# Patient Record
Sex: FEMALE | Race: WHITE | Employment: OTHER | URBAN - METROPOLITAN AREA
[De-identification: names, ages, dates, MRNs, and addresses within clinical notes are randomized per-mention and may not be internally consistent; named-entity substitution may affect disease eponyms.]

---

## 2017-03-07 PROBLEM — Z95.2 H/O MITRAL VALVE REPLACEMENT: Status: ACTIVE | Noted: 2017-03-07

## 2017-03-07 PROBLEM — E03.9 ACQUIRED HYPOTHYROIDISM: Status: ACTIVE | Noted: 2017-03-07

## 2017-03-07 PROBLEM — Z95.2 H/O AORTIC VALVE REPLACEMENT: Status: ACTIVE | Noted: 2017-03-07

## 2017-03-07 PROBLEM — H35.341 MACULAR HOLE OF RIGHT EYE: Status: ACTIVE | Noted: 2017-03-07

## 2018-04-05 ENCOUNTER — ANTI-COAG VISIT (OUTPATIENT)
Dept: FAMILY MEDICINE CLINIC | Age: 68
End: 2018-04-05
Payer: MEDICARE

## 2018-04-05 DIAGNOSIS — Z95.2 H/O AORTIC VALVE REPLACEMENT: ICD-10-CM

## 2018-04-05 DIAGNOSIS — Z95.2 H/O MITRAL VALVE REPLACEMENT: ICD-10-CM

## 2018-04-05 LAB — INR BLD: 3.4

## 2018-04-05 PROCEDURE — 93793 ANTICOAG MGMT PT WARFARIN: CPT | Performed by: FAMILY MEDICINE

## 2018-04-12 DIAGNOSIS — E03.9 ACQUIRED HYPOTHYROIDISM: ICD-10-CM

## 2018-04-12 DIAGNOSIS — R53.83 FATIGUE, UNSPECIFIED TYPE: ICD-10-CM

## 2018-04-12 DIAGNOSIS — Z13.220 SCREENING FOR HYPERLIPIDEMIA: ICD-10-CM

## 2018-04-16 DIAGNOSIS — E55.9 VITAMIN D DEFICIENCY: ICD-10-CM

## 2018-05-09 ENCOUNTER — ANTI-COAG VISIT (OUTPATIENT)
Dept: FAMILY MEDICINE CLINIC | Age: 68
End: 2018-05-09

## 2018-05-09 LAB — INR BLD: 2.7

## 2018-07-01 LAB — INR BLD: 2.7

## 2018-07-02 ENCOUNTER — ANTI-COAG VISIT (OUTPATIENT)
Dept: FAMILY MEDICINE CLINIC | Age: 68
End: 2018-07-02
Payer: MEDICARE

## 2018-07-02 DIAGNOSIS — Z95.2 H/O MITRAL VALVE REPLACEMENT: ICD-10-CM

## 2018-07-02 DIAGNOSIS — Z95.2 H/O AORTIC VALVE REPLACEMENT: ICD-10-CM

## 2018-07-02 PROCEDURE — 93793 ANTICOAG MGMT PT WARFARIN: CPT | Performed by: FAMILY MEDICINE

## 2018-08-02 DIAGNOSIS — Z95.2 H/O AORTIC VALVE REPLACEMENT: Primary | ICD-10-CM

## 2018-08-03 ENCOUNTER — ANTI-COAG VISIT (OUTPATIENT)
Dept: FAMILY MEDICINE CLINIC | Age: 68
End: 2018-08-03
Payer: MEDICARE

## 2018-08-03 DIAGNOSIS — Z95.2 H/O MITRAL VALVE REPLACEMENT: ICD-10-CM

## 2018-08-03 DIAGNOSIS — Z95.2 H/O AORTIC VALVE REPLACEMENT: ICD-10-CM

## 2018-08-03 LAB — INR BLD: 3.2

## 2018-08-03 PROCEDURE — 93793 ANTICOAG MGMT PT WARFARIN: CPT | Performed by: FAMILY MEDICINE

## 2018-09-11 ENCOUNTER — OFFICE VISIT (OUTPATIENT)
Dept: FAMILY MEDICINE CLINIC | Age: 68
End: 2018-09-11
Payer: MEDICARE

## 2018-09-11 ENCOUNTER — ANTI-COAG VISIT (OUTPATIENT)
Dept: FAMILY MEDICINE CLINIC | Age: 68
End: 2018-09-11
Payer: MEDICARE

## 2018-09-11 VITALS
HEART RATE: 81 BPM | TEMPERATURE: 98.4 F | OXYGEN SATURATION: 98 % | WEIGHT: 149 LBS | RESPIRATION RATE: 16 BRPM | HEIGHT: 66 IN | SYSTOLIC BLOOD PRESSURE: 112 MMHG | DIASTOLIC BLOOD PRESSURE: 64 MMHG | BODY MASS INDEX: 23.95 KG/M2

## 2018-09-11 DIAGNOSIS — Z01.818 PREOP EXAMINATION: Primary | ICD-10-CM

## 2018-09-11 DIAGNOSIS — Z95.2 H/O AORTIC VALVE REPLACEMENT: ICD-10-CM

## 2018-09-11 LAB — INR BLD: 1.9

## 2018-09-11 PROCEDURE — 1123F ACP DISCUSS/DSCN MKR DOCD: CPT | Performed by: FAMILY MEDICINE

## 2018-09-11 PROCEDURE — G8400 PT W/DXA NO RESULTS DOC: HCPCS | Performed by: FAMILY MEDICINE

## 2018-09-11 PROCEDURE — 1090F PRES/ABSN URINE INCON ASSESS: CPT | Performed by: FAMILY MEDICINE

## 2018-09-11 PROCEDURE — 1101F PT FALLS ASSESS-DOCD LE1/YR: CPT | Performed by: FAMILY MEDICINE

## 2018-09-11 PROCEDURE — 3017F COLORECTAL CA SCREEN DOC REV: CPT | Performed by: FAMILY MEDICINE

## 2018-09-11 PROCEDURE — 4040F PNEUMOC VAC/ADMIN/RCVD: CPT | Performed by: FAMILY MEDICINE

## 2018-09-11 PROCEDURE — 93000 ELECTROCARDIOGRAM COMPLETE: CPT | Performed by: FAMILY MEDICINE

## 2018-09-11 PROCEDURE — 4004F PT TOBACCO SCREEN RCVD TLK: CPT | Performed by: FAMILY MEDICINE

## 2018-09-11 PROCEDURE — 99213 OFFICE O/P EST LOW 20 MIN: CPT | Performed by: FAMILY MEDICINE

## 2018-09-11 PROCEDURE — G8427 DOCREV CUR MEDS BY ELIG CLIN: HCPCS | Performed by: FAMILY MEDICINE

## 2018-09-11 PROCEDURE — 93793 ANTICOAG MGMT PT WARFARIN: CPT | Performed by: FAMILY MEDICINE

## 2018-09-11 PROCEDURE — G8420 CALC BMI NORM PARAMETERS: HCPCS | Performed by: FAMILY MEDICINE

## 2018-09-11 ASSESSMENT — ENCOUNTER SYMPTOMS
NAUSEA: 0
COUGH: 0
HEARTBURN: 0
ORTHOPNEA: 0

## 2018-09-11 NOTE — PROGRESS NOTES
HPI:  Patient comes in today for   Chief Complaint   Patient presents with    Pre-op Exam     pt here for surgical clearance, having cataract surgery on right eye on 10/3/18 with Dr. Luanne Herrera   . H/O Aortic and Mitral Valve replacement. Review of Systems  Review of Systems   Constitutional: Negative for chills, fever and weight loss. HENT: Negative for hearing loss and tinnitus. Respiratory: Negative for cough. Cardiovascular: Negative for chest pain, palpitations, orthopnea, claudication and leg swelling. Gastrointestinal: Negative for heartburn and nausea. Genitourinary: Negative for dysuria. Musculoskeletal: Negative for myalgias and neck pain. Skin: Negative for rash. Neurological: Negative for dizziness and headaches. PE:  VS:  /64   Pulse 81   Temp 98.4 °F (36.9 °C)   Resp 16   Ht 5' 5.5\" (1.664 m)   Wt 149 lb (67.6 kg)   SpO2 98%   BMI 24.42 kg/m²   Physical Exam   Constitutional: She appears well-developed. HENT:   Head: Normocephalic and atraumatic. Eyes: Pupils are equal, round, and reactive to light. EOM are normal.   Neck: Normal range of motion. No thyromegaly present. Cardiovascular: Normal rate. Pulmonary/Chest: Effort normal.   Abdominal: Soft. She exhibits no distension. There is no tenderness. Musculoskeletal: Normal range of motion. Neurological: She is alert. Skin: Skin is warm. No erythema. Psychiatric: She has a normal mood and affect. Assessment/Plan:  Latoya Corbin was seen today for pre-op exam.    Diagnoses and all orders for this visit:    Preop examination  -     EKG 12 Lead    Latoya Corbin is medically cleared for her cataract surgery. CHLOE Smith

## 2018-09-27 ENCOUNTER — ANTI-COAG VISIT (OUTPATIENT)
Dept: FAMILY MEDICINE CLINIC | Age: 68
End: 2018-09-27
Payer: MEDICARE

## 2018-09-27 DIAGNOSIS — Z95.2 H/O AORTIC VALVE REPLACEMENT: ICD-10-CM

## 2018-09-27 LAB — INR BLD: 3.7

## 2018-09-27 PROCEDURE — 93793 ANTICOAG MGMT PT WARFARIN: CPT | Performed by: FAMILY MEDICINE

## 2018-10-24 ENCOUNTER — ANTI-COAG VISIT (OUTPATIENT)
Dept: FAMILY MEDICINE CLINIC | Age: 68
End: 2018-10-24
Payer: MEDICARE

## 2018-10-24 DIAGNOSIS — Z95.2 H/O MITRAL VALVE REPLACEMENT: ICD-10-CM

## 2018-10-24 DIAGNOSIS — Z95.2 H/O AORTIC VALVE REPLACEMENT: ICD-10-CM

## 2018-10-24 LAB — INR BLD: 2.8

## 2018-10-24 PROCEDURE — 93793 ANTICOAG MGMT PT WARFARIN: CPT | Performed by: FAMILY MEDICINE

## 2018-12-03 ENCOUNTER — ANTI-COAG VISIT (OUTPATIENT)
Dept: FAMILY MEDICINE CLINIC | Age: 68
End: 2018-12-03
Payer: MEDICARE

## 2018-12-03 DIAGNOSIS — Z95.2 H/O MITRAL VALVE REPLACEMENT: ICD-10-CM

## 2018-12-03 DIAGNOSIS — Z95.2 H/O AORTIC VALVE REPLACEMENT: ICD-10-CM

## 2018-12-03 LAB — INR BLD: 3.5

## 2018-12-03 PROCEDURE — 93793 ANTICOAG MGMT PT WARFARIN: CPT | Performed by: FAMILY MEDICINE

## 2019-01-03 ENCOUNTER — ANTI-COAG VISIT (OUTPATIENT)
Dept: FAMILY MEDICINE CLINIC | Age: 69
End: 2019-01-03

## 2019-01-03 LAB — INR BLD: 2.6

## 2019-01-31 ENCOUNTER — ANTI-COAG VISIT (OUTPATIENT)
Dept: FAMILY MEDICINE CLINIC | Age: 69
End: 2019-01-31
Payer: MEDICARE

## 2019-01-31 DIAGNOSIS — Z95.2 H/O AORTIC VALVE REPLACEMENT: ICD-10-CM

## 2019-01-31 LAB — INR BLD: 3.6

## 2019-01-31 PROCEDURE — 93793 ANTICOAG MGMT PT WARFARIN: CPT | Performed by: FAMILY MEDICINE

## 2019-02-27 LAB — INR BLD: 3.2

## 2019-02-28 ENCOUNTER — ANTI-COAG VISIT (OUTPATIENT)
Dept: FAMILY MEDICINE CLINIC | Age: 69
End: 2019-02-28
Payer: MEDICARE

## 2019-02-28 DIAGNOSIS — Z95.2 H/O MITRAL VALVE REPLACEMENT: ICD-10-CM

## 2019-02-28 PROCEDURE — 93793 ANTICOAG MGMT PT WARFARIN: CPT | Performed by: FAMILY MEDICINE

## 2019-05-02 ENCOUNTER — ANTI-COAG VISIT (OUTPATIENT)
Dept: FAMILY MEDICINE CLINIC | Age: 69
End: 2019-05-02
Payer: MEDICARE

## 2019-05-02 DIAGNOSIS — Z95.2 H/O AORTIC VALVE REPLACEMENT: ICD-10-CM

## 2019-05-02 DIAGNOSIS — Z95.2 H/O MITRAL VALVE REPLACEMENT: ICD-10-CM

## 2019-05-02 LAB — INR BLD: 2.7

## 2019-05-02 PROCEDURE — 93793 ANTICOAG MGMT PT WARFARIN: CPT | Performed by: FAMILY MEDICINE

## 2019-06-03 ENCOUNTER — ANTI-COAG VISIT (OUTPATIENT)
Dept: FAMILY MEDICINE CLINIC | Age: 69
End: 2019-06-03
Payer: MEDICARE

## 2019-06-03 DIAGNOSIS — Z95.2 H/O AORTIC VALVE REPLACEMENT: ICD-10-CM

## 2019-06-03 DIAGNOSIS — Z95.2 H/O MITRAL VALVE REPLACEMENT: ICD-10-CM

## 2019-06-03 LAB — INR BLD: 7

## 2019-06-03 PROCEDURE — 93793 ANTICOAG MGMT PT WARFARIN: CPT | Performed by: FAMILY MEDICINE

## 2019-06-03 NOTE — PROGRESS NOTES
Verbal orders obtained from Dr Nafisa Benton for pt to hold coumadin until Thursday and recheck INR Thursday am. Advised pt understanding voiced.

## 2019-06-06 ENCOUNTER — ANTI-COAG VISIT (OUTPATIENT)
Dept: FAMILY MEDICINE CLINIC | Age: 69
End: 2019-06-06
Payer: MEDICARE

## 2019-06-06 DIAGNOSIS — Z95.2 H/O AORTIC VALVE REPLACEMENT: ICD-10-CM

## 2019-06-06 LAB — INR BLD: 1.3

## 2019-06-06 PROCEDURE — 93793 ANTICOAG MGMT PT WARFARIN: CPT | Performed by: FAMILY MEDICINE

## 2019-06-11 ENCOUNTER — ANTI-COAG VISIT (OUTPATIENT)
Dept: FAMILY MEDICINE CLINIC | Age: 69
End: 2019-06-11
Payer: MEDICARE

## 2019-06-11 DIAGNOSIS — Z95.2 H/O MITRAL VALVE REPLACEMENT: ICD-10-CM

## 2019-06-11 DIAGNOSIS — Z95.2 H/O AORTIC VALVE REPLACEMENT: ICD-10-CM

## 2019-06-11 LAB — INR BLD: 1.5

## 2019-06-11 PROCEDURE — 93793 ANTICOAG MGMT PT WARFARIN: CPT | Performed by: FAMILY MEDICINE

## 2019-06-17 ENCOUNTER — ANTI-COAG VISIT (OUTPATIENT)
Dept: FAMILY MEDICINE CLINIC | Age: 69
End: 2019-06-17
Payer: MEDICARE

## 2019-06-17 DIAGNOSIS — Z95.2 H/O AORTIC VALVE REPLACEMENT: ICD-10-CM

## 2019-06-17 LAB — INR BLD: 2.5

## 2019-06-17 PROCEDURE — 93793 ANTICOAG MGMT PT WARFARIN: CPT | Performed by: FAMILY MEDICINE

## 2019-07-02 ENCOUNTER — ANTI-COAG VISIT (OUTPATIENT)
Dept: FAMILY MEDICINE CLINIC | Age: 69
End: 2019-07-02
Payer: MEDICARE

## 2019-07-02 DIAGNOSIS — Z95.2 H/O MITRAL VALVE REPLACEMENT: ICD-10-CM

## 2019-07-02 DIAGNOSIS — Z95.2 H/O AORTIC VALVE REPLACEMENT: ICD-10-CM

## 2019-07-02 LAB — INR BLD: 2.3

## 2019-07-02 PROCEDURE — 93793 ANTICOAG MGMT PT WARFARIN: CPT | Performed by: FAMILY MEDICINE

## 2019-08-28 ENCOUNTER — ANTI-COAG VISIT (OUTPATIENT)
Dept: FAMILY MEDICINE CLINIC | Age: 69
End: 2019-08-28
Payer: MEDICARE

## 2019-08-28 DIAGNOSIS — Z95.2 H/O AORTIC VALVE REPLACEMENT: ICD-10-CM

## 2019-08-28 DIAGNOSIS — Z95.2 H/O MITRAL VALVE REPLACEMENT: ICD-10-CM

## 2019-08-28 LAB — INR BLD: 2.3

## 2019-08-28 PROCEDURE — 93793 ANTICOAG MGMT PT WARFARIN: CPT | Performed by: FAMILY MEDICINE

## 2019-09-04 ENCOUNTER — ANTI-COAG VISIT (OUTPATIENT)
Dept: FAMILY MEDICINE CLINIC | Age: 69
End: 2019-09-04
Payer: MEDICARE

## 2019-09-04 DIAGNOSIS — Z95.2 H/O AORTIC VALVE REPLACEMENT: ICD-10-CM

## 2019-09-04 LAB — INR BLD: 1.9

## 2019-09-04 PROCEDURE — 93793 ANTICOAG MGMT PT WARFARIN: CPT | Performed by: FAMILY MEDICINE

## 2019-09-20 ENCOUNTER — ANTI-COAG VISIT (OUTPATIENT)
Dept: FAMILY MEDICINE CLINIC | Age: 69
End: 2019-09-20
Payer: MEDICARE

## 2019-09-20 DIAGNOSIS — Z95.2 H/O AORTIC VALVE REPLACEMENT: ICD-10-CM

## 2019-09-20 DIAGNOSIS — Z95.2 H/O MITRAL VALVE REPLACEMENT: ICD-10-CM

## 2019-09-20 LAB — INR BLD: 3

## 2019-09-20 PROCEDURE — 93793 ANTICOAG MGMT PT WARFARIN: CPT | Performed by: FAMILY MEDICINE

## 2019-10-07 DIAGNOSIS — Z95.2 H/O MITRAL VALVE REPLACEMENT: ICD-10-CM

## 2019-10-07 DIAGNOSIS — Z95.2 H/O AORTIC VALVE REPLACEMENT: ICD-10-CM

## 2019-10-07 DIAGNOSIS — E03.9 ACQUIRED HYPOTHYROIDISM: ICD-10-CM

## 2019-10-07 RX ORDER — WARFARIN SODIUM 10 MG/1
TABLET ORAL
Qty: 30 TABLET | Refills: 0 | Status: SHIPPED | OUTPATIENT
Start: 2019-10-07 | End: 2019-11-11 | Stop reason: SDUPTHER

## 2019-10-07 RX ORDER — LEVOTHYROXINE SODIUM 100 MCG
TABLET ORAL
Qty: 90 TABLET | Refills: 1 | Status: SHIPPED | OUTPATIENT
Start: 2019-10-07 | End: 2020-01-15 | Stop reason: SDUPTHER

## 2019-10-07 RX ORDER — ATENOLOL 50 MG/1
TABLET ORAL
Qty: 30 TABLET | Refills: 0 | Status: SHIPPED | OUTPATIENT
Start: 2019-10-07 | End: 2019-11-11 | Stop reason: SDUPTHER

## 2019-11-09 LAB — INR BLD: 2.6

## 2019-11-11 ENCOUNTER — ANTI-COAG VISIT (OUTPATIENT)
Dept: FAMILY MEDICINE CLINIC | Age: 69
End: 2019-11-11
Payer: MEDICARE

## 2019-11-11 DIAGNOSIS — Z95.2 H/O AORTIC VALVE REPLACEMENT: ICD-10-CM

## 2019-11-11 DIAGNOSIS — Z95.2 H/O MITRAL VALVE REPLACEMENT: ICD-10-CM

## 2019-11-11 PROCEDURE — 93793 ANTICOAG MGMT PT WARFARIN: CPT | Performed by: FAMILY MEDICINE

## 2019-11-11 RX ORDER — ATENOLOL 50 MG/1
TABLET ORAL
Qty: 30 TABLET | Refills: 0 | Status: SHIPPED | OUTPATIENT
Start: 2019-11-11 | End: 2019-12-10 | Stop reason: SDUPTHER

## 2019-11-11 RX ORDER — WARFARIN SODIUM 10 MG/1
TABLET ORAL
Qty: 30 TABLET | Refills: 0 | Status: SHIPPED | OUTPATIENT
Start: 2019-11-11 | End: 2019-12-10 | Stop reason: SDUPTHER

## 2019-12-10 ENCOUNTER — ANTI-COAG VISIT (OUTPATIENT)
Dept: FAMILY MEDICINE CLINIC | Age: 69
End: 2019-12-10
Payer: MEDICARE

## 2019-12-10 DIAGNOSIS — Z95.2 H/O MITRAL VALVE REPLACEMENT: ICD-10-CM

## 2019-12-10 DIAGNOSIS — Z95.2 H/O AORTIC VALVE REPLACEMENT: ICD-10-CM

## 2019-12-10 LAB — INR BLD: 2.4

## 2019-12-10 PROCEDURE — 93793 ANTICOAG MGMT PT WARFARIN: CPT | Performed by: FAMILY MEDICINE

## 2019-12-10 RX ORDER — WARFARIN SODIUM 10 MG/1
TABLET ORAL
Qty: 30 TABLET | Refills: 0 | Status: SHIPPED | OUTPATIENT
Start: 2019-12-10 | End: 2020-01-15 | Stop reason: SDUPTHER

## 2019-12-10 RX ORDER — ATENOLOL 50 MG/1
TABLET ORAL
Qty: 30 TABLET | Refills: 0 | Status: SHIPPED | OUTPATIENT
Start: 2019-12-10 | End: 2020-01-14

## 2020-01-14 RX ORDER — ATENOLOL 50 MG/1
TABLET ORAL
Qty: 5 TABLET | Refills: 0 | Status: SHIPPED | OUTPATIENT
Start: 2020-01-14 | End: 2020-01-15 | Stop reason: SDUPTHER

## 2020-01-15 ENCOUNTER — OFFICE VISIT (OUTPATIENT)
Dept: FAMILY MEDICINE CLINIC | Age: 70
End: 2020-01-15
Payer: MEDICARE

## 2020-01-15 VITALS
OXYGEN SATURATION: 97 % | SYSTOLIC BLOOD PRESSURE: 128 MMHG | TEMPERATURE: 98 F | WEIGHT: 152 LBS | HEART RATE: 152 BPM | RESPIRATION RATE: 16 BRPM | BODY MASS INDEX: 25.33 KG/M2 | DIASTOLIC BLOOD PRESSURE: 86 MMHG | HEIGHT: 65 IN

## 2020-01-15 PROCEDURE — 1123F ACP DISCUSS/DSCN MKR DOCD: CPT | Performed by: FAMILY MEDICINE

## 2020-01-15 PROCEDURE — 4040F PNEUMOC VAC/ADMIN/RCVD: CPT | Performed by: FAMILY MEDICINE

## 2020-01-15 PROCEDURE — G0438 PPPS, INITIAL VISIT: HCPCS | Performed by: FAMILY MEDICINE

## 2020-01-15 PROCEDURE — G8482 FLU IMMUNIZE ORDER/ADMIN: HCPCS | Performed by: FAMILY MEDICINE

## 2020-01-15 PROCEDURE — 3017F COLORECTAL CA SCREEN DOC REV: CPT | Performed by: FAMILY MEDICINE

## 2020-01-15 RX ORDER — ATENOLOL 50 MG/1
TABLET ORAL
Qty: 30 TABLET | Refills: 11 | Status: ON HOLD
Start: 2020-01-15 | End: 2020-05-17 | Stop reason: HOSPADM

## 2020-01-15 RX ORDER — BUSPIRONE HYDROCHLORIDE 5 MG/1
5 TABLET ORAL 2 TIMES DAILY
Qty: 60 TABLET | Refills: 0 | Status: SHIPPED
Start: 2020-01-15 | End: 2020-02-10

## 2020-01-15 RX ORDER — WARFARIN SODIUM 10 MG/1
TABLET ORAL
Qty: 30 TABLET | Refills: 11 | Status: SHIPPED
Start: 2020-01-15 | End: 2020-05-14 | Stop reason: DRUGHIGH

## 2020-01-15 RX ORDER — LEVOTHYROXINE SODIUM 100 MCG
TABLET ORAL
Qty: 30 TABLET | Refills: 11 | Status: ON HOLD
Start: 2020-01-15 | End: 2020-05-17 | Stop reason: HOSPADM

## 2020-01-15 ASSESSMENT — LIFESTYLE VARIABLES
HOW OFTEN DO YOU HAVE SIX OR MORE DRINKS ON ONE OCCASION: 0
HOW OFTEN DO YOU HAVE A DRINK CONTAINING ALCOHOL: 1
AUDIT-C TOTAL SCORE: 1
HOW MANY STANDARD DRINKS CONTAINING ALCOHOL DO YOU HAVE ON A TYPICAL DAY: 0

## 2020-01-15 ASSESSMENT — PATIENT HEALTH QUESTIONNAIRE - PHQ9
SUM OF ALL RESPONSES TO PHQ QUESTIONS 1-9: 1
SUM OF ALL RESPONSES TO PHQ QUESTIONS 1-9: 1

## 2020-01-15 NOTE — PROGRESS NOTES
Alcohol Misuse  How often do you have a drink containing alcohol?: Monthly or less  How many standard drinks containing alcohol do you have on a typical day when drinking?: One or two  How often do you have six or more drinks on one occasion?: Never  Audit-C Score: 1  Substance Abuse Interventions:  · none    General Health:  General  In general, how would you say your health is?: Very Good  In the past 7 days, have you experienced any of the following? New or Increased Pain, New or Increased Fatigue, Loneliness, Social Isolation, Stress or Anger?: (!) Stress  Do you get the social and emotional support that you need?: Yes  Do you have a Living Will?: (!) No  General Health Risk Interventions:  · none    Health Habits/Nutrition:  Health Habits/Nutrition  Do you exercise for at least 20 minutes 2-3 times per week?: Yes  Have you lost any weight without trying in the past 3 months?: No  Do you eat fewer than 2 meals per day?: No  Have you seen a dentist within the past year?: (!) No  Body mass index is 25.29 kg/m².   Health Habits/Nutrition Interventions:  · none    Hearing/Vision:  No exam data present  Hearing/Vision  Do you or your family notice any trouble with your hearing?: No  Do you have difficulty driving, watching TV, or doing any of your daily activities because of your eyesight?: (!) Yes  Have you had an eye exam within the past year?: (!) No  Hearing/Vision Interventions:  · none    Personalized Preventive Plan   Current Health Maintenance Status  Immunization History   Administered Date(s) Administered    Influenza, Triv, inactivated, subunit, adjuvanted, IM (Fluad 65 yrs and older) 10/13/2018, 10/14/2019    Pneumococcal Conjugate 13-valent (Shamika Teachey) 03/09/2018    Tdap (Boostrix, Adacel) 05/10/2018        Health Maintenance   Topic Date Due    Hepatitis C screen  1950    Diabetes screen  02/19/1989    Breast cancer screen  02/19/1999    Shingles Vaccine (1 of 2) 02/19/1999    Colon cancer

## 2020-01-21 LAB
ALBUMIN SERPL-MCNC: NORMAL G/DL
ALP BLD-CCNC: NORMAL U/L
ALT SERPL-CCNC: NORMAL U/L
ANION GAP SERPL CALCULATED.3IONS-SCNC: NORMAL MMOL/L
ANTIBODY: NORMAL
AST SERPL-CCNC: NORMAL U/L
BASOPHILS ABSOLUTE: NORMAL
BASOPHILS RELATIVE PERCENT: NORMAL
BILIRUB SERPL-MCNC: NORMAL MG/DL
BUN BLDV-MCNC: NORMAL MG/DL
CALCIUM SERPL-MCNC: NORMAL MG/DL
CHLORIDE BLD-SCNC: NORMAL MMOL/L
CHOLESTEROL, TOTAL: 181 MG/DL
CHOLESTEROL/HDL RATIO: NORMAL
CO2: NORMAL
CREAT SERPL-MCNC: NORMAL MG/DL
EOSINOPHILS ABSOLUTE: NORMAL
EOSINOPHILS RELATIVE PERCENT: NORMAL
GFR CALCULATED: NORMAL
GLUCOSE BLD-MCNC: NORMAL MG/DL
HCT VFR BLD CALC: NORMAL %
HDLC SERPL-MCNC: 70 MG/DL (ref 35–70)
HEMOGLOBIN: NORMAL
LDL CHOLESTEROL CALCULATED: 95 MG/DL (ref 0–160)
LYMPHOCYTES ABSOLUTE: NORMAL
LYMPHOCYTES RELATIVE PERCENT: NORMAL
MCH RBC QN AUTO: NORMAL PG
MCHC RBC AUTO-ENTMCNC: NORMAL G/DL
MCV RBC AUTO: NORMAL FL
MONOCYTES ABSOLUTE: NORMAL
MONOCYTES RELATIVE PERCENT: NORMAL
NEUTROPHILS ABSOLUTE: NORMAL
NEUTROPHILS RELATIVE PERCENT: NORMAL
PDW BLD-RTO: NORMAL %
PLATELET # BLD: NORMAL 10*3/UL
PMV BLD AUTO: NORMAL FL
POTASSIUM SERPL-SCNC: NORMAL MMOL/L
RBC # BLD: NORMAL 10*6/UL
SODIUM BLD-SCNC: NORMAL MMOL/L
T4 FREE: 1.7
TOTAL PROTEIN: NORMAL
TRIGL SERPL-MCNC: 80 MG/DL
TSH SERPL DL<=0.05 MIU/L-ACNC: 1.61 UIU/ML
VLDLC SERPL CALC-MCNC: 16 MG/DL
WBC # BLD: NORMAL 10*3/UL

## 2020-01-22 ENCOUNTER — ANTI-COAG VISIT (OUTPATIENT)
Dept: FAMILY MEDICINE CLINIC | Age: 70
End: 2020-01-22
Payer: MEDICARE

## 2020-01-22 LAB — INR BLD: 3.2

## 2020-01-22 PROCEDURE — 93793 ANTICOAG MGMT PT WARFARIN: CPT | Performed by: FAMILY MEDICINE

## 2020-02-10 RX ORDER — BUSPIRONE HYDROCHLORIDE 5 MG/1
TABLET ORAL
Qty: 60 TABLET | Refills: 0 | Status: SHIPPED
Start: 2020-02-10 | End: 2020-03-13

## 2020-02-13 ENCOUNTER — HOSPITAL ENCOUNTER (OUTPATIENT)
Dept: MAMMOGRAPHY | Age: 70
Discharge: HOME | End: 2020-02-15
Payer: MEDICARE

## 2020-02-18 ENCOUNTER — ANTI-COAG VISIT (OUTPATIENT)
Dept: FAMILY MEDICINE CLINIC | Age: 70
End: 2020-02-18
Payer: MEDICARE

## 2020-02-18 LAB — INR BLD: 2.6

## 2020-02-18 PROCEDURE — 93793 ANTICOAG MGMT PT WARFARIN: CPT | Performed by: FAMILY MEDICINE

## 2020-03-13 RX ORDER — BUSPIRONE HYDROCHLORIDE 5 MG/1
TABLET ORAL
Qty: 60 TABLET | Refills: 0 | Status: SHIPPED
Start: 2020-03-13 | End: 2020-04-13

## 2020-03-19 ENCOUNTER — ANTI-COAG VISIT (OUTPATIENT)
Dept: FAMILY MEDICINE CLINIC | Age: 70
End: 2020-03-19
Payer: MEDICARE

## 2020-03-19 LAB — INR BLD: 3.5

## 2020-03-19 PROCEDURE — 93793 ANTICOAG MGMT PT WARFARIN: CPT | Performed by: FAMILY MEDICINE

## 2020-04-13 RX ORDER — BUSPIRONE HYDROCHLORIDE 5 MG/1
TABLET ORAL
Qty: 60 TABLET | Refills: 0 | Status: ON HOLD
Start: 2020-04-13 | End: 2020-05-17 | Stop reason: HOSPADM

## 2020-04-21 ENCOUNTER — ANTI-COAG VISIT (OUTPATIENT)
Dept: FAMILY MEDICINE CLINIC | Age: 70
End: 2020-04-21
Payer: MEDICARE

## 2020-04-21 LAB — INR BLD: 1.3

## 2020-04-21 PROCEDURE — 93793 ANTICOAG MGMT PT WARFARIN: CPT | Performed by: FAMILY MEDICINE

## 2020-04-29 ENCOUNTER — ANTI-COAG VISIT (OUTPATIENT)
Dept: FAMILY MEDICINE CLINIC | Age: 70
End: 2020-04-29
Payer: MEDICARE

## 2020-04-29 LAB — INR BLD: 1.8

## 2020-04-29 PROCEDURE — 93793 ANTICOAG MGMT PT WARFARIN: CPT | Performed by: FAMILY MEDICINE

## 2020-05-14 ENCOUNTER — APPOINTMENT (OUTPATIENT)
Dept: CT IMAGING | Age: 70
DRG: 281 | End: 2020-05-14
Payer: MEDICARE

## 2020-05-14 ENCOUNTER — HOSPITAL ENCOUNTER (INPATIENT)
Age: 70
LOS: 3 days | Discharge: ANOTHER HOSPITAL | DRG: 281 | End: 2020-05-17
Attending: EMERGENCY MEDICINE | Admitting: INTERNAL MEDICINE
Payer: MEDICARE

## 2020-05-14 ENCOUNTER — OFFICE VISIT (OUTPATIENT)
Dept: FAMILY MEDICINE CLINIC | Age: 70
End: 2020-05-14
Payer: MEDICARE

## 2020-05-14 VITALS
DIASTOLIC BLOOD PRESSURE: 80 MMHG | OXYGEN SATURATION: 94 % | SYSTOLIC BLOOD PRESSURE: 122 MMHG | HEIGHT: 65 IN | HEART RATE: 138 BPM | BODY MASS INDEX: 25.29 KG/M2 | TEMPERATURE: 97.2 F

## 2020-05-14 PROBLEM — I21.4 NSTEMI (NON-ST ELEVATED MYOCARDIAL INFARCTION) (HCC): Status: ACTIVE | Noted: 2020-05-14

## 2020-05-14 PROBLEM — I48.92 ATRIAL FLUTTER (HCC): Status: ACTIVE | Noted: 2020-05-14

## 2020-05-14 LAB
ABO/RH: NORMAL
ALBUMIN SERPL-MCNC: 4.5 G/DL (ref 3.5–5.2)
ALP BLD-CCNC: 133 U/L (ref 35–104)
ALT SERPL-CCNC: 30 U/L (ref 0–32)
ANION GAP SERPL CALCULATED.3IONS-SCNC: 17 MMOL/L (ref 7–16)
ANTIBODY SCREEN: NORMAL
APTT: 52.9 SEC (ref 24.5–35.1)
AST SERPL-CCNC: 40 U/L (ref 0–31)
BASOPHILS ABSOLUTE: 0.08 E9/L (ref 0–0.2)
BASOPHILS RELATIVE PERCENT: 0.8 % (ref 0–2)
BILIRUB SERPL-MCNC: 2.4 MG/DL (ref 0–1.2)
BILIRUBIN DIRECT: 0.6 MG/DL (ref 0–0.3)
BILIRUBIN, INDIRECT: 1.8 MG/DL (ref 0–1)
BUN BLDV-MCNC: 23 MG/DL (ref 8–23)
CALCIUM SERPL-MCNC: 9.2 MG/DL (ref 8.6–10.2)
CHLORIDE BLD-SCNC: 104 MMOL/L (ref 98–107)
CO2: 19 MMOL/L (ref 22–29)
CREAT SERPL-MCNC: 0.7 MG/DL (ref 0.5–1)
EKG ATRIAL RATE: 271 BPM
EKG Q-T INTERVAL: 394 MS
EKG QRS DURATION: 90 MS
EKG QTC CALCULATION (BAZETT): 547 MS
EKG R AXIS: 106 DEGREES
EKG T AXIS: 117 DEGREES
EKG VENTRICULAR RATE: 116 BPM
EOSINOPHILS ABSOLUTE: 0.01 E9/L (ref 0.05–0.5)
EOSINOPHILS RELATIVE PERCENT: 0.1 % (ref 0–6)
GFR AFRICAN AMERICAN: >60
GFR NON-AFRICAN AMERICAN: >60 ML/MIN/1.73
GLUCOSE BLD-MCNC: 133 MG/DL (ref 74–99)
HCT VFR BLD CALC: 34.6 % (ref 34–48)
HCT VFR BLD CALC: 38.3 % (ref 34–48)
HCT VFR BLD CALC: 41 % (ref 34–48)
HEMOGLOBIN: 10.7 G/DL (ref 11.5–15.5)
HEMOGLOBIN: 11.7 G/DL (ref 11.5–15.5)
HEMOGLOBIN: 12.3 G/DL (ref 11.5–15.5)
IMMATURE GRANULOCYTES #: 0.04 E9/L
IMMATURE GRANULOCYTES %: 0.4 % (ref 0–5)
INR BLD: 7.4
LACTIC ACID: 1 MMOL/L (ref 0.5–2.2)
LACTIC ACID: 2.3 MMOL/L (ref 0.5–2.2)
LACTIC ACID: 2.9 MMOL/L (ref 0.5–2.2)
LV EF: 25 %
LVEF MODALITY: NORMAL
LYMPHOCYTES ABSOLUTE: 1.34 E9/L (ref 1.5–4)
LYMPHOCYTES RELATIVE PERCENT: 13.2 % (ref 20–42)
MAGNESIUM: 2.2 MG/DL (ref 1.6–2.6)
MCH RBC QN AUTO: 24.9 PG (ref 26–35)
MCHC RBC AUTO-ENTMCNC: 30.5 % (ref 32–34.5)
MCV RBC AUTO: 81.7 FL (ref 80–99.9)
MONOCYTES ABSOLUTE: 0.69 E9/L (ref 0.1–0.95)
MONOCYTES RELATIVE PERCENT: 6.8 % (ref 2–12)
NEUTROPHILS ABSOLUTE: 8 E9/L (ref 1.8–7.3)
NEUTROPHILS RELATIVE PERCENT: 78.7 % (ref 43–80)
PDW BLD-RTO: 18.9 FL (ref 11.5–15)
PLATELET # BLD: 226 E9/L (ref 130–450)
PMV BLD AUTO: 11 FL (ref 7–12)
POTASSIUM REFLEX MAGNESIUM: 4.5 MMOL/L (ref 3.5–5)
PRO-BNP: 5470 PG/ML (ref 0–125)
PROTHROMBIN TIME: 85 SEC (ref 9.3–12.4)
RBC # BLD: 4.69 E12/L (ref 3.5–5.5)
SODIUM BLD-SCNC: 140 MMOL/L (ref 132–146)
TOTAL PROTEIN: 7.4 G/DL (ref 6.4–8.3)
TROPONIN: 0.04 NG/ML (ref 0–0.03)
TROPONIN: 0.05 NG/ML (ref 0–0.03)
TSH SERPL DL<=0.05 MIU/L-ACNC: 3.47 UIU/ML (ref 0.27–4.2)
WBC # BLD: 10.2 E9/L (ref 4.5–11.5)

## 2020-05-14 PROCEDURE — G8400 PT W/DXA NO RESULTS DOC: HCPCS | Performed by: FAMILY MEDICINE

## 2020-05-14 PROCEDURE — 6360000004 HC RX CONTRAST MEDICATION: Performed by: RADIOLOGY

## 2020-05-14 PROCEDURE — 3017F COLORECTAL CA SCREEN DOC REV: CPT | Performed by: FAMILY MEDICINE

## 2020-05-14 PROCEDURE — 2580000003 HC RX 258: Performed by: INTERNAL MEDICINE

## 2020-05-14 PROCEDURE — 99285 EMERGENCY DEPT VISIT HI MDM: CPT

## 2020-05-14 PROCEDURE — 85018 HEMOGLOBIN: CPT

## 2020-05-14 PROCEDURE — 84484 ASSAY OF TROPONIN QUANT: CPT

## 2020-05-14 PROCEDURE — 93010 ELECTROCARDIOGRAM REPORT: CPT | Performed by: INTERNAL MEDICINE

## 2020-05-14 PROCEDURE — 99222 1ST HOSP IP/OBS MODERATE 55: CPT | Performed by: PHYSICIAN ASSISTANT

## 2020-05-14 PROCEDURE — 85730 THROMBOPLASTIN TIME PARTIAL: CPT

## 2020-05-14 PROCEDURE — 6360000002 HC RX W HCPCS: Performed by: PHYSICIAN ASSISTANT

## 2020-05-14 PROCEDURE — G8427 DOCREV CUR MEDS BY ELIG CLIN: HCPCS | Performed by: FAMILY MEDICINE

## 2020-05-14 PROCEDURE — 2060000000 HC ICU INTERMEDIATE R&B

## 2020-05-14 PROCEDURE — 86901 BLOOD TYPING SEROLOGIC RH(D): CPT

## 2020-05-14 PROCEDURE — 83605 ASSAY OF LACTIC ACID: CPT

## 2020-05-14 PROCEDURE — 4040F PNEUMOC VAC/ADMIN/RCVD: CPT | Performed by: FAMILY MEDICINE

## 2020-05-14 PROCEDURE — 84443 ASSAY THYROID STIM HORMONE: CPT

## 2020-05-14 PROCEDURE — 83880 ASSAY OF NATRIURETIC PEPTIDE: CPT

## 2020-05-14 PROCEDURE — 71275 CT ANGIOGRAPHY CHEST: CPT

## 2020-05-14 PROCEDURE — 85014 HEMATOCRIT: CPT

## 2020-05-14 PROCEDURE — G8420 CALC BMI NORM PARAMETERS: HCPCS | Performed by: FAMILY MEDICINE

## 2020-05-14 PROCEDURE — 93000 ELECTROCARDIOGRAM COMPLETE: CPT | Performed by: FAMILY MEDICINE

## 2020-05-14 PROCEDURE — 80076 HEPATIC FUNCTION PANEL: CPT

## 2020-05-14 PROCEDURE — 86850 RBC ANTIBODY SCREEN: CPT

## 2020-05-14 PROCEDURE — 1090F PRES/ABSN URINE INCON ASSESS: CPT | Performed by: FAMILY MEDICINE

## 2020-05-14 PROCEDURE — 36415 COLL VENOUS BLD VENIPUNCTURE: CPT

## 2020-05-14 PROCEDURE — 85025 COMPLETE CBC W/AUTO DIFF WBC: CPT

## 2020-05-14 PROCEDURE — 80048 BASIC METABOLIC PNL TOTAL CA: CPT

## 2020-05-14 PROCEDURE — 83735 ASSAY OF MAGNESIUM: CPT

## 2020-05-14 PROCEDURE — 93005 ELECTROCARDIOGRAM TRACING: CPT | Performed by: EMERGENCY MEDICINE

## 2020-05-14 PROCEDURE — 1123F ACP DISCUSS/DSCN MKR DOCD: CPT | Performed by: FAMILY MEDICINE

## 2020-05-14 PROCEDURE — 99214 OFFICE O/P EST MOD 30 MIN: CPT | Performed by: FAMILY MEDICINE

## 2020-05-14 PROCEDURE — 93306 TTE W/DOPPLER COMPLETE: CPT

## 2020-05-14 PROCEDURE — 86900 BLOOD TYPING SEROLOGIC ABO: CPT

## 2020-05-14 PROCEDURE — 4004F PT TOBACCO SCREEN RCVD TLK: CPT | Performed by: FAMILY MEDICINE

## 2020-05-14 PROCEDURE — 6370000000 HC RX 637 (ALT 250 FOR IP): Performed by: INTERNAL MEDICINE

## 2020-05-14 PROCEDURE — 85610 PROTHROMBIN TIME: CPT

## 2020-05-14 RX ORDER — BUSPIRONE HYDROCHLORIDE 5 MG/1
5 TABLET ORAL 2 TIMES DAILY
Status: DISCONTINUED | OUTPATIENT
Start: 2020-05-14 | End: 2020-05-17 | Stop reason: HOSPADM

## 2020-05-14 RX ORDER — METOPROLOL SUCCINATE 25 MG/1
25 TABLET, EXTENDED RELEASE ORAL ONCE
Status: DISCONTINUED | OUTPATIENT
Start: 2020-05-14 | End: 2020-05-17 | Stop reason: HOSPADM

## 2020-05-14 RX ORDER — SODIUM CHLORIDE 0.9 % (FLUSH) 0.9 %
10 SYRINGE (ML) INJECTION PRN
Status: DISCONTINUED | OUTPATIENT
Start: 2020-05-14 | End: 2020-05-17 | Stop reason: HOSPADM

## 2020-05-14 RX ORDER — ATENOLOL 50 MG/1
50 TABLET ORAL DAILY
Status: CANCELLED | OUTPATIENT
Start: 2020-05-15

## 2020-05-14 RX ORDER — WARFARIN SODIUM 10 MG/1
5 TABLET ORAL WEEKLY
Status: ON HOLD | COMMUNITY
End: 2020-05-17 | Stop reason: HOSPADM

## 2020-05-14 RX ORDER — FUROSEMIDE 10 MG/ML
40 INJECTION INTRAMUSCULAR; INTRAVENOUS ONCE
Status: COMPLETED | OUTPATIENT
Start: 2020-05-14 | End: 2020-05-14

## 2020-05-14 RX ORDER — ACETAMINOPHEN 650 MG/1
650 SUPPOSITORY RECTAL EVERY 6 HOURS PRN
Status: DISCONTINUED | OUTPATIENT
Start: 2020-05-14 | End: 2020-05-17 | Stop reason: HOSPADM

## 2020-05-14 RX ORDER — DIGOXIN 0.25 MG/ML
500 INJECTION INTRAMUSCULAR; INTRAVENOUS ONCE
Status: COMPLETED | OUTPATIENT
Start: 2020-05-14 | End: 2020-05-14

## 2020-05-14 RX ORDER — ACETAMINOPHEN 325 MG/1
650 TABLET ORAL EVERY 6 HOURS PRN
Status: DISCONTINUED | OUTPATIENT
Start: 2020-05-14 | End: 2020-05-17 | Stop reason: HOSPADM

## 2020-05-14 RX ORDER — WARFARIN SODIUM 10 MG/1
10 TABLET ORAL
Status: ON HOLD | COMMUNITY
End: 2020-05-17 | Stop reason: HOSPADM

## 2020-05-14 RX ORDER — SODIUM CHLORIDE 0.9 % (FLUSH) 0.9 %
10 SYRINGE (ML) INJECTION EVERY 12 HOURS SCHEDULED
Status: DISCONTINUED | OUTPATIENT
Start: 2020-05-14 | End: 2020-05-17 | Stop reason: HOSPADM

## 2020-05-14 RX ORDER — LEVOTHYROXINE SODIUM 0.1 MG/1
100 TABLET ORAL DAILY
Status: DISCONTINUED | OUTPATIENT
Start: 2020-05-15 | End: 2020-05-17 | Stop reason: HOSPADM

## 2020-05-14 RX ORDER — METOPROLOL SUCCINATE 25 MG/1
50 TABLET, EXTENDED RELEASE ORAL 2 TIMES DAILY
Status: DISCONTINUED | OUTPATIENT
Start: 2020-05-14 | End: 2020-05-15

## 2020-05-14 RX ORDER — FUROSEMIDE 10 MG/ML
20 INJECTION INTRAMUSCULAR; INTRAVENOUS 2 TIMES DAILY
Status: DISCONTINUED | OUTPATIENT
Start: 2020-05-14 | End: 2020-05-15

## 2020-05-14 RX ORDER — DIGOXIN 0.25 MG/ML
250 INJECTION INTRAMUSCULAR; INTRAVENOUS EVERY 4 HOURS
Status: DISCONTINUED | OUTPATIENT
Start: 2020-05-14 | End: 2020-05-14

## 2020-05-14 RX ORDER — POLYETHYLENE GLYCOL 3350 17 G/17G
17 POWDER, FOR SOLUTION ORAL DAILY PRN
Status: DISCONTINUED | OUTPATIENT
Start: 2020-05-14 | End: 2020-05-17 | Stop reason: HOSPADM

## 2020-05-14 RX ADMIN — FUROSEMIDE 20 MG: 10 INJECTION, SOLUTION INTRAMUSCULAR; INTRAVENOUS at 23:30

## 2020-05-14 RX ADMIN — FUROSEMIDE 40 MG: 10 INJECTION, SOLUTION INTRAMUSCULAR; INTRAVENOUS at 16:53

## 2020-05-14 RX ADMIN — IOPAMIDOL 75 ML: 755 INJECTION, SOLUTION INTRAVENOUS at 12:44

## 2020-05-14 RX ADMIN — DIGOXIN 250 MCG: 250 INJECTION, SOLUTION INTRAMUSCULAR; INTRAVENOUS; PARENTERAL at 18:43

## 2020-05-14 RX ADMIN — BUSPIRONE HYDROCHLORIDE 5 MG: 5 TABLET ORAL at 23:29

## 2020-05-14 RX ADMIN — Medication 10 ML: at 23:29

## 2020-05-14 RX ADMIN — DIGOXIN 500 MCG: 0.25 INJECTION INTRAMUSCULAR; INTRAVENOUS at 15:00

## 2020-05-14 ASSESSMENT — ENCOUNTER SYMPTOMS
DIARRHEA: 0
SHORTNESS OF BREATH: 1
WHEEZING: 0
CHOKING: 0
BLOOD IN STOOL: 0
CHEST TIGHTNESS: 0
CONSTIPATION: 0
COUGH: 0

## 2020-05-14 ASSESSMENT — PAIN SCALES - GENERAL
PAINLEVEL_OUTOF10: 0

## 2020-05-14 NOTE — H&P
section of this note. EKG revealed atrial flutter with variable AV block, rightward axis, inferior infarct, possibly acute, anterior infarct age undetermined, prolonged QT. Upon arrival to the ER, patient was 133/97 with pulse of 133. The patient received no medications in the emergency room and was admitted to [facility] under the care of Dr. Norberto Yang and [facility] Admission -   None in EMR    Last Echocardiogram - 1/25/12   Left ventricular size is grossly normal.    Normal left ventricular wall thickness. Ejection fraction is visually estimated at 35%. No evidence of left ventricular mass or thrombus noted. No regional wall motion abnormalities seen. Overall ejection fraction moderate-to-severely decreased . The left atrium is moderately dilated. Interatrial septum appears intact. No evidence of thrombus within left atrium. Borderline dilated right ventricle. No evidence of a thrombus in the right ventricle. Mildly enlarged right atrium size. No evidence of thrombus or mass in the right atrium. Mitral valve replacement (mechanical)    Normal pressure gradient    Mechanical valve replacement    Normal pressure gradient    Mild tricuspid regurgitation. The tricuspid valve appears structurally normal.    RVSP is 30.8 mmHg. Pulmonary hypertension is mild . The pulmonic valve was not well visualized. Physiologic and/or trace pulmonic regurgitation present.      ED TRIAGE VITALS  BP: (!) 136/114, Temp: 97.6 °F (36.4 °C), Pulse: 85, Resp: 22, SpO2: 97 %    Vitals:    05/14/20 1137 05/14/20 1214   BP: (!) 133/97 (!) 136/114   Pulse: 133 85   Resp: 24 22   Temp: 97.6 °F (36.4 °C)    TempSrc: Oral    SpO2: 98% 97%   Weight: 145 lb (65.8 kg)    Height: 5' 5\" (1.651 m)          Histories  Past Medical History:   Diagnosis Date    Hypertension     Hyperthyroidism      Past Surgical History:   Procedure Laterality Date    CARDIAC VALVE REPLACEMENT      ECHO COMPL W [employer] service: Not on file     Active member of club or organization: Not on file     Attends meetings of clubs or organizations: Not on file     Relationship status: Not on file    Intimate partner violence     Fear of current or ex partner: Not on file     Emotionally abused: Not on file     Physically abused: Not on file     Forced sexual activity: Not on file   Other Topics Concern    Not on file   Social History Narrative    Not on file       Review of Systems  All bolded are positive; please see HPI  General:  Fever, chills, diaphoresis, fatigue, malaise, night sweats, weight loss  Psychological:  Anxiety, disorientation, hallucinations. ENT:  Epistaxis, headaches, vertigo, visual changes. Cardiovascular:  Chest pain, irregular heartbeats, palpitations, paroxysmal nocturnal dyspnea. Respiratory:  Shortness of breath, coughing, sputum production, hemoptysis, wheezing, orthopnea.   Gastrointestinal:  Nausea, vomiting, diarrhea, heartburn, constipation, abdominal pain, hematemesis, hematochezia, melena, acholic stools  Genito-Urinary:  Dysuria, urgency, frequency, hematuria  Musculoskeletal:  Joint pain, joint stiffness, joint swelling, muscle pain  Neurology:  Headache, focal neurological deficits, weakness, numbness, paresthesia  Derm:  Rashes, ulcers, excoriations, bruising  Extremities:  Decreased ROM, peripheral edema, mottling    Physical Examination  Vitals:  BP (!) 136/114   Pulse 85   Temp 97.6 °F (36.4 °C) (Oral)   Resp 22   Ht 5' 5\" (1.651 m)   Wt 145 lb (65.8 kg)   SpO2 97%   BMI 24.13 kg/m²   General Appearance:  awake, alert, and oriented to person, place, time, and purpose; appears stated age and cooperative; no apparent distress no labored breathing  HEENT:  NCAT; PERRL; conjunctiva pink, sclera clear  Neck:  no adenopathy, bruit, JVD, tenderness, masses, or nodules; supple, symmetrical, trachea midline, thyroid not enlarged  Lung:  Diminished in lung bases; no use of accessory muscles; no rhonchi, rales, or crackles  Heart: regularly irregular, tachycardic, with click   Abdomen:  soft, nontender, nondistended; normoactive bowel sounds; no organomegaly  Extremities:  extremities normal, atraumatic, no cyanosis or edema  Musculokeletal:  no joint swelling, no muscle tenderness. ROM normal in all joints of extremities.    Neurologic:  mental status A&Ox3, thought content appropriate; CN II-XII grossly intact; sensation intact, motor strength 5/5 globally; no slurred speech      Laboratory Data  Recent Results (from the past 24 hour(s))   EKG 12 Lead    Collection Time: 05/14/20 11:47 AM   Result Value Ref Range    Ventricular Rate 116 BPM    Atrial Rate 271 BPM    QRS Duration 90 ms    Q-T Interval 394 ms    QTc Calculation (Bazett) 547 ms    R Axis 106 degrees    T Axis 117 degrees   CBC Auto Differential    Collection Time: 05/14/20 11:54 AM   Result Value Ref Range    WBC 10.2 4.5 - 11.5 E9/L    RBC 4.69 3.50 - 5.50 E12/L    Hemoglobin 11.7 11.5 - 15.5 g/dL    Hematocrit 38.3 34.0 - 48.0 %    MCV 81.7 80.0 - 99.9 fL    MCH 24.9 (L) 26.0 - 35.0 pg    MCHC 30.5 (L) 32.0 - 34.5 %    RDW 18.9 (H) 11.5 - 15.0 fL    Platelets 952 952 - 178 E9/L    MPV 11.0 7.0 - 12.0 fL    Neutrophils % 78.7 43.0 - 80.0 %    Immature Granulocytes % 0.4 0.0 - 5.0 %    Lymphocytes % 13.2 (L) 20.0 - 42.0 %    Monocytes % 6.8 2.0 - 12.0 %    Eosinophils % 0.1 0.0 - 6.0 %    Basophils % 0.8 0.0 - 2.0 %    Neutrophils Absolute 8.00 (H) 1.80 - 7.30 E9/L    Immature Granulocytes # 0.04 E9/L    Lymphocytes Absolute 1.34 (L) 1.50 - 4.00 E9/L    Monocytes Absolute 0.69 0.10 - 0.95 E9/L    Eosinophils Absolute 0.01 (L) 0.05 - 0.50 E9/L    Basophils Absolute 0.08 0.00 - 0.20 D5/R   Basic Metabolic Panel w/ Reflex to MG    Collection Time: 05/14/20 11:54 AM   Result Value Ref Range    Sodium 140 132 - 146 mmol/L    Potassium reflex Magnesium 4.5 3.5 - 5.0 mmol/L    Chloride 104 98 - 107 mmol/L    CO2 19 (L) 22 - 29 mmol/L

## 2020-05-14 NOTE — CONSULTS
non-compliance. IV diuresis initiated. Monitor creatinine, electrolytes, daily weights and strict intake/output. Echocardiogram ordered for evaluation of LV function and valvular heart disease. 2. Atrial flutter/fibrillation with RVR: Check TSH. Digoxin loading and start metoprolol therapy. Chronic coumadin therapy with supratherapeutic INR this admission of 7.4. Coumadin management as per primary service. 3. History of aortic and mitral valve replacements: Rheumatic fever as a child. Both mechanical done around 2000. On coumadin therapy. 4. Supratherapeutic INR. 5. Elevated troponin: Likely type II NSTEMI given acute HF and tachycardia. Patient will likely need ischemic evaluation prior to discharge however. 6. Hypothyroidism: On replacement therapy. Check TSH. 7. Tobacco abuse. 8. Elevated bilirubin and AST. Abnormal CTA Chest showing gallbladder wall edema. 9. Elevated lactic acid on admission. 10. Medical non-compliance. 11. Hypertension: Well-controlled. 12. Anxiety. RECOMMENDATIONS:  1. Echocardiogram for review of valvular heart disease, RV/LV function. 2. Digoxin loading - 500 mcg IV x 1 dose, followed by 250 mcg IV x 2 doses every four hours. 3. Start Toprol-XL 50 mg twice daily. 4. Check TSH. Check magnesium level. 5. IV Lasix 40 mg x 1 dose now, followed by 20 mg IV twice daily. 6. Strict intake and output monitoring. Monitor daily weights, creatinine and electrolytes. 7. Coumadin management as per primary service. 8. NPO at midnight. 9. Further recommendations pending patient's clinical course. 10. Rest as per primary team and other consultants. Above case and recommendations have been discussed with Dr. Marilin Dougherty -- he is in agreement with above assessment and plan. Electronically signed by Mary Liao PA-C on 5/14/2020 at 2:18 PM     I have reviewed the above documentation completed by the advance practitioner.  Please see my additional contributions to the HPI, physical exam, assessment and medical decision making. Brittany Morris D.O.   Cardiologist  Cardiology, [address]

## 2020-05-15 ENCOUNTER — APPOINTMENT (OUTPATIENT)
Dept: ULTRASOUND IMAGING | Age: 70
DRG: 281 | End: 2020-05-15
Payer: MEDICARE

## 2020-05-15 LAB
ALBUMIN SERPL-MCNC: 4 G/DL (ref 3.5–5.2)
ALP BLD-CCNC: 116 U/L (ref 35–104)
ALT SERPL-CCNC: 27 U/L (ref 0–32)
ANION GAP SERPL CALCULATED.3IONS-SCNC: 13 MMOL/L (ref 7–16)
AST SERPL-CCNC: 34 U/L (ref 0–31)
BILIRUB SERPL-MCNC: 1.6 MG/DL (ref 0–1.2)
BUN BLDV-MCNC: 23 MG/DL (ref 8–23)
CALCIUM SERPL-MCNC: 9 MG/DL (ref 8.6–10.2)
CHLORIDE BLD-SCNC: 105 MMOL/L (ref 98–107)
CHOLESTEROL, TOTAL: 140 MG/DL (ref 0–199)
CO2: 25 MMOL/L (ref 22–29)
CREAT SERPL-MCNC: 0.8 MG/DL (ref 0.5–1)
EKG ATRIAL RATE: 267 BPM
EKG Q-T INTERVAL: 398 MS
EKG QRS DURATION: 98 MS
EKG QTC CALCULATION (BAZETT): 548 MS
EKG R AXIS: 104 DEGREES
EKG T AXIS: 114 DEGREES
EKG VENTRICULAR RATE: 114 BPM
GFR AFRICAN AMERICAN: >60
GFR NON-AFRICAN AMERICAN: >60 ML/MIN/1.73
GLUCOSE BLD-MCNC: 91 MG/DL (ref 74–99)
HBA1C MFR BLD: 5.6 % (ref 4–5.6)
HCT VFR BLD CALC: 36.5 % (ref 34–48)
HCT VFR BLD CALC: 36.6 % (ref 34–48)
HCT VFR BLD CALC: 38.6 % (ref 34–48)
HDLC SERPL-MCNC: 46 MG/DL
HEMOGLOBIN: 11.2 G/DL (ref 11.5–15.5)
HEMOGLOBIN: 11.3 G/DL (ref 11.5–15.5)
HEMOGLOBIN: 11.7 G/DL (ref 11.5–15.5)
INR BLD: 7.9
LACTIC ACID: 1.1 MMOL/L (ref 0.5–2.2)
LACTIC ACID: 1.2 MMOL/L (ref 0.5–2.2)
LACTIC ACID: 2.2 MMOL/L (ref 0.5–2.2)
LDL CHOLESTEROL CALCULATED: 80 MG/DL (ref 0–99)
MAGNESIUM: 1.8 MG/DL (ref 1.6–2.6)
MCH RBC QN AUTO: 24.6 PG (ref 26–35)
MCHC RBC AUTO-ENTMCNC: 30.3 % (ref 32–34.5)
MCV RBC AUTO: 81.1 FL (ref 80–99.9)
PDW BLD-RTO: 19 FL (ref 11.5–15)
PHOSPHORUS: 3.6 MG/DL (ref 2.5–4.5)
PLATELET # BLD: 193 E9/L (ref 130–450)
PMV BLD AUTO: 10.8 FL (ref 7–12)
POTASSIUM SERPL-SCNC: 3.7 MMOL/L (ref 3.5–5)
PROTHROMBIN TIME: 91.2 SEC (ref 9.3–12.4)
RBC # BLD: 4.76 E12/L (ref 3.5–5.5)
SARS-COV-2, NAAT: NOT DETECTED
SODIUM BLD-SCNC: 143 MMOL/L (ref 132–146)
TOTAL PROTEIN: 6.8 G/DL (ref 6.4–8.3)
TRIGL SERPL-MCNC: 71 MG/DL (ref 0–149)
VLDLC SERPL CALC-MCNC: 14 MG/DL
WBC # BLD: 11.3 E9/L (ref 4.5–11.5)

## 2020-05-15 PROCEDURE — 85027 COMPLETE CBC AUTOMATED: CPT

## 2020-05-15 PROCEDURE — 85014 HEMATOCRIT: CPT

## 2020-05-15 PROCEDURE — 6360000002 HC RX W HCPCS: Performed by: INTERNAL MEDICINE

## 2020-05-15 PROCEDURE — 2060000000 HC ICU INTERMEDIATE R&B

## 2020-05-15 PROCEDURE — U0002 COVID-19 LAB TEST NON-CDC: HCPCS

## 2020-05-15 PROCEDURE — 6370000000 HC RX 637 (ALT 250 FOR IP): Performed by: INTERNAL MEDICINE

## 2020-05-15 PROCEDURE — 99233 SBSQ HOSP IP/OBS HIGH 50: CPT | Performed by: INTERNAL MEDICINE

## 2020-05-15 PROCEDURE — 80061 LIPID PANEL: CPT

## 2020-05-15 PROCEDURE — 2700000000 HC OXYGEN THERAPY PER DAY

## 2020-05-15 PROCEDURE — 83735 ASSAY OF MAGNESIUM: CPT

## 2020-05-15 PROCEDURE — 76705 ECHO EXAM OF ABDOMEN: CPT

## 2020-05-15 PROCEDURE — 36415 COLL VENOUS BLD VENIPUNCTURE: CPT

## 2020-05-15 PROCEDURE — 84100 ASSAY OF PHOSPHORUS: CPT

## 2020-05-15 PROCEDURE — 2580000003 HC RX 258: Performed by: INTERNAL MEDICINE

## 2020-05-15 PROCEDURE — 80053 COMPREHEN METABOLIC PANEL: CPT

## 2020-05-15 PROCEDURE — 83036 HEMOGLOBIN GLYCOSYLATED A1C: CPT

## 2020-05-15 PROCEDURE — 83605 ASSAY OF LACTIC ACID: CPT

## 2020-05-15 PROCEDURE — APPSS45 APP SPLIT SHARED TIME 31-45 MINUTES: Performed by: PHYSICIAN ASSISTANT

## 2020-05-15 PROCEDURE — 93010 ELECTROCARDIOGRAM REPORT: CPT | Performed by: INTERNAL MEDICINE

## 2020-05-15 PROCEDURE — 85610 PROTHROMBIN TIME: CPT

## 2020-05-15 PROCEDURE — 85018 HEMOGLOBIN: CPT

## 2020-05-15 RX ORDER — SPIRONOLACTONE 25 MG/1
12.5 TABLET ORAL DAILY
Status: DISCONTINUED | OUTPATIENT
Start: 2020-05-16 | End: 2020-05-17 | Stop reason: HOSPADM

## 2020-05-15 RX ORDER — METOPROLOL SUCCINATE 25 MG/1
12.5 TABLET, EXTENDED RELEASE ORAL 2 TIMES DAILY
Status: DISCONTINUED | OUTPATIENT
Start: 2020-05-15 | End: 2020-05-17 | Stop reason: HOSPADM

## 2020-05-15 RX ORDER — FUROSEMIDE 10 MG/ML
40 INJECTION INTRAMUSCULAR; INTRAVENOUS 2 TIMES DAILY
Status: DISCONTINUED | OUTPATIENT
Start: 2020-05-15 | End: 2020-05-17 | Stop reason: HOSPADM

## 2020-05-15 RX ORDER — FUROSEMIDE 10 MG/ML
40 INJECTION INTRAMUSCULAR; INTRAVENOUS DAILY
Status: DISCONTINUED | OUTPATIENT
Start: 2020-05-15 | End: 2020-05-15

## 2020-05-15 RX ORDER — LOSARTAN POTASSIUM 25 MG/1
12.5 TABLET ORAL DAILY
Status: DISCONTINUED | OUTPATIENT
Start: 2020-05-15 | End: 2020-05-17 | Stop reason: HOSPADM

## 2020-05-15 RX ADMIN — NITROGLYCERIN 0.5 INCH: 20 OINTMENT TOPICAL at 09:22

## 2020-05-15 RX ADMIN — Medication 10 ML: at 20:03

## 2020-05-15 RX ADMIN — Medication 10 ML: at 09:49

## 2020-05-15 RX ADMIN — FUROSEMIDE 40 MG: 10 INJECTION, SOLUTION INTRAMUSCULAR; INTRAVENOUS at 09:21

## 2020-05-15 RX ADMIN — BUSPIRONE HYDROCHLORIDE 5 MG: 5 TABLET ORAL at 09:21

## 2020-05-15 RX ADMIN — NITROGLYCERIN 0.5 INCH: 20 OINTMENT TOPICAL at 17:53

## 2020-05-15 RX ADMIN — NITROGLYCERIN 0.5 INCH: 20 OINTMENT TOPICAL at 12:32

## 2020-05-15 RX ADMIN — LOSARTAN POTASSIUM 12.5 MG: 25 TABLET ORAL at 16:27

## 2020-05-15 RX ADMIN — BUSPIRONE HYDROCHLORIDE 5 MG: 5 TABLET ORAL at 20:03

## 2020-05-15 RX ADMIN — FUROSEMIDE 40 MG: 10 INJECTION, SOLUTION INTRAMUSCULAR; INTRAVENOUS at 17:54

## 2020-05-15 RX ADMIN — LEVOTHYROXINE SODIUM 100 MCG: 0.1 TABLET ORAL at 06:24

## 2020-05-15 ASSESSMENT — PAIN SCALES - GENERAL
PAINLEVEL_OUTOF10: 0

## 2020-05-15 NOTE — CARE COORDINATION
5-15-Cm note: met with pt for transition of care needs, pt lives with SO, she has no history of DME or rehab placement, pt is independent . Pt is on 3l nc here, has no home 02 Pt doesn't anticipate needing it at dc. Pt on chronic Coumadin. . Denies any needs at this time, SO will provide transportation at dc.  Electronically signed by Su Mead RN on 5/15/2020 at 10:29 AM

## 2020-05-15 NOTE — PLAN OF CARE
Problem: Falls - Risk of:  Goal: Will remain free from falls  Description: Will remain free from falls  5/15/2020 0355 by Phoenix Shah RN  Outcome: Met This Shift    Goal: Absence of physical injury  Description: Absence of physical injury  5/15/2020 0355 by Phoenix Shah RN  Outcome: Met This Shift       Problem: Cardiac:  Goal: Ability to maintain an adequate cardiac output will improve  Description: Ability to maintain an adequate cardiac output will improve  Outcome: Met This Shift  Goal: Hemodynamic stability will improve  Description: Hemodynamic stability will improve  Outcome: Met This Shift     Problem: Fluid Volume:  Goal: Ability to achieve and maintain adequate urine output will improve  Description: Ability to achieve and maintain adequate urine output will improve  Outcome: Met This Shift

## 2020-05-15 NOTE — PROGRESS NOTES
Cardiovascular: Normal rate, regular rhythm, normal heart sounds and intact distal pulses. Exam reveals no gallop and no friction rub. No murmur heard. Pulmonary/Chest: Effort normal and breath sounds normal. No respiratory distress. No wheezes. No rales. No tenderness. Abdominal: Soft. Bowel sounds are normal. No distension and no mass. No tenderness. No rebound and no guarding. Musculoskeletal: Normal range of motion. No edema and no tenderness. Lymphadenopathy:   No cervical adenopathy. Neurological: Alert and oriented to person, place, and time. Skin: Skin is warm and dry. No rash noted. Not diaphoretic. No erythema. Psychiatric: Normal mood and affect. Behavior is normal.     DATA:    Telemetry: Atrial flutter/fibrillation with HR in the 60s. Multiple pauses overnight, some reaching > 6 seconds. Diagnostic:  All diagnostic testing and lab work thus far this hospitalization reviewed in detail.     Chest CTA 05/14/2020:  FINDINGS:  Pulmonary Arteries:  No central, lobar, or segmental pulmonary embolus. Mediastinum: Moderate cardiomegaly.  Postoperative changes in the aortic  valve and mitral valve.  Mid ascending thoracic aorta measures up 42 mm.  No  pericardial effusion.  No thoracic adenopathy. Lungs/pleura: Moderate-sized right pleural effusion.  Small layering left  pleural effusion.  Mosaic attenuation of the lungs.  Minimal dependent  atelectasis in the lower lobes.  No focal lung consolidation.  Moderate right  middle lobe atelectasis.  The central airways are clear. Upper Abdomen: Partially imaged gallbladder wall edema is nonspecific. Soft Tissues/Bones: No acute osseous abnormality.     Impression:  1. No acute pulmonary embolus. 2. Findings of pulmonary edema with moderate right pleural effusion and small  left pleural effusion.  Mild-moderate bibasilar atelectasis. 3. Nonspecific partially imaged gallbladder wall edema. 2D TTE this admission: Pending.       Intake/Output

## 2020-05-15 NOTE — PROGRESS NOTES
Adjustment has been made in chromotropic agent per cardiology  · Echocardiogram shows significant decreased ejection fraction at 25%. Lige Amel will be added  · Diuretics for symptomatic relief of congestive failure  · Supratherapeutic INR is present we will adjust Coumadin to keep INR between 2.5 and 3.5 due to mechanical valves  · Closely monitor renal function  · Follow with other consultants at the present time    I reviewed the patient's past medical, surgical history and medication. Patient's medications were reviewed/continued/adjusted. Labs as ordered. Please see orders for further plan of care. Rhythm strips reviewed as well as consultant recommendations/notes and/or discussion. I reviewed the  course of events since last visit. More than 50% of my  time was spent at the bedside counseling and/or coordination of care with the patient and/or family with face to face contact. This time was spent reviewing notes and laboratory data, instructing and counseling the patient. Time I spent with the family or surrogate(s) is included only if the patient was incapable of providing the necessary information or participating in medical decisionsI also discussed the differential diagnosis and all of the proposed management plans with the patient and individuals accompanying the patient. Ashley Mahsaon requires this high level of physician care and nursing in the [address] due the complexity of decision management and chance of rapid decline or death. Continued cardiac monitoring and higher level of nursing are required. I am ready available for decision making and intervention. I reviewed the relevant imaging studies and available reports. I also discussed the differential diagnosis and all of the proposed management plans with the patient and individuals accompanying the patient to this visit. I reviewed the relevant imaging studies and available reports. José Miguel Pagan DO, F.A.C.O.I.   On 5/15/2020  7:54 AM

## 2020-05-16 LAB
ALBUMIN SERPL-MCNC: 3.8 G/DL (ref 3.5–5.2)
ALP BLD-CCNC: 108 U/L (ref 35–104)
ALT SERPL-CCNC: 24 U/L (ref 0–32)
ANION GAP SERPL CALCULATED.3IONS-SCNC: 13 MMOL/L (ref 7–16)
AST SERPL-CCNC: 29 U/L (ref 0–31)
BILIRUB SERPL-MCNC: 1.4 MG/DL (ref 0–1.2)
BUN BLDV-MCNC: 24 MG/DL (ref 8–23)
CALCIUM SERPL-MCNC: 8.6 MG/DL (ref 8.6–10.2)
CHLORIDE BLD-SCNC: 103 MMOL/L (ref 98–107)
CO2: 26 MMOL/L (ref 22–29)
CREAT SERPL-MCNC: 0.7 MG/DL (ref 0.5–1)
GFR AFRICAN AMERICAN: >60
GFR NON-AFRICAN AMERICAN: >60 ML/MIN/1.73
GLUCOSE BLD-MCNC: 87 MG/DL (ref 74–99)
HCT VFR BLD CALC: 37.8 % (ref 34–48)
HCT VFR BLD CALC: 38.8 % (ref 34–48)
HCT VFR BLD CALC: 40.5 % (ref 34–48)
HEMOGLOBIN: 11.6 G/DL (ref 11.5–15.5)
HEMOGLOBIN: 11.6 G/DL (ref 11.5–15.5)
HEMOGLOBIN: 12.1 G/DL (ref 11.5–15.5)
INR BLD: 4.5
LACTIC ACID: 0.9 MMOL/L (ref 0.5–2.2)
LACTIC ACID: 1.1 MMOL/L (ref 0.5–2.2)
LACTIC ACID: 1.6 MMOL/L (ref 0.5–2.2)
MCH RBC QN AUTO: 25 PG (ref 26–35)
MCHC RBC AUTO-ENTMCNC: 30.7 % (ref 32–34.5)
MCV RBC AUTO: 81.5 FL (ref 80–99.9)
PDW BLD-RTO: 18.6 FL (ref 11.5–15)
PLATELET # BLD: 175 E9/L (ref 130–450)
PMV BLD AUTO: 10.6 FL (ref 7–12)
POTASSIUM SERPL-SCNC: 3.4 MMOL/L (ref 3.5–5)
PROTHROMBIN TIME: 51.9 SEC (ref 9.3–12.4)
RBC # BLD: 4.64 E12/L (ref 3.5–5.5)
SODIUM BLD-SCNC: 142 MMOL/L (ref 132–146)
TOTAL PROTEIN: 6.2 G/DL (ref 6.4–8.3)
WBC # BLD: 8.5 E9/L (ref 4.5–11.5)

## 2020-05-16 PROCEDURE — 2500000003 HC RX 250 WO HCPCS: Performed by: INTERNAL MEDICINE

## 2020-05-16 PROCEDURE — 6370000000 HC RX 637 (ALT 250 FOR IP): Performed by: INTERNAL MEDICINE

## 2020-05-16 PROCEDURE — 2700000000 HC OXYGEN THERAPY PER DAY

## 2020-05-16 PROCEDURE — 36415 COLL VENOUS BLD VENIPUNCTURE: CPT

## 2020-05-16 PROCEDURE — 2580000003 HC RX 258: Performed by: INTERNAL MEDICINE

## 2020-05-16 PROCEDURE — 6360000002 HC RX W HCPCS: Performed by: INTERNAL MEDICINE

## 2020-05-16 PROCEDURE — 99233 SBSQ HOSP IP/OBS HIGH 50: CPT | Performed by: INTERNAL MEDICINE

## 2020-05-16 PROCEDURE — 83605 ASSAY OF LACTIC ACID: CPT

## 2020-05-16 PROCEDURE — 2060000000 HC ICU INTERMEDIATE R&B

## 2020-05-16 PROCEDURE — 85014 HEMATOCRIT: CPT

## 2020-05-16 PROCEDURE — 85027 COMPLETE CBC AUTOMATED: CPT

## 2020-05-16 PROCEDURE — 85018 HEMOGLOBIN: CPT

## 2020-05-16 PROCEDURE — 80053 COMPREHEN METABOLIC PANEL: CPT

## 2020-05-16 PROCEDURE — 85610 PROTHROMBIN TIME: CPT

## 2020-05-16 RX ORDER — POTASSIUM CHLORIDE 20 MEQ/1
20 TABLET, EXTENDED RELEASE ORAL ONCE
Status: COMPLETED | OUTPATIENT
Start: 2020-05-16 | End: 2020-05-16

## 2020-05-16 RX ORDER — LORAZEPAM 0.5 MG/1
0.5 TABLET ORAL EVERY 4 HOURS PRN
Status: DISCONTINUED | OUTPATIENT
Start: 2020-05-16 | End: 2020-05-17 | Stop reason: HOSPADM

## 2020-05-16 RX ADMIN — FUROSEMIDE 40 MG: 10 INJECTION, SOLUTION INTRAMUSCULAR; INTRAVENOUS at 16:52

## 2020-05-16 RX ADMIN — NITROGLYCERIN 0.5 INCH: 20 OINTMENT TOPICAL at 00:19

## 2020-05-16 RX ADMIN — DILTIAZEM HYDROCHLORIDE 5 MG/HR: 5 INJECTION INTRAVENOUS at 18:09

## 2020-05-16 RX ADMIN — LORAZEPAM 0.5 MG: 0.5 TABLET ORAL at 18:14

## 2020-05-16 RX ADMIN — LEVOTHYROXINE SODIUM 100 MCG: 0.1 TABLET ORAL at 06:06

## 2020-05-16 RX ADMIN — NITROGLYCERIN 0.5 INCH: 20 OINTMENT TOPICAL at 06:06

## 2020-05-16 RX ADMIN — BUSPIRONE HYDROCHLORIDE 5 MG: 5 TABLET ORAL at 07:57

## 2020-05-16 RX ADMIN — LOSARTAN POTASSIUM 12.5 MG: 25 TABLET ORAL at 16:57

## 2020-05-16 RX ADMIN — NITROGLYCERIN 0.5 INCH: 20 OINTMENT TOPICAL at 13:54

## 2020-05-16 RX ADMIN — FUROSEMIDE 40 MG: 10 INJECTION, SOLUTION INTRAMUSCULAR; INTRAVENOUS at 07:57

## 2020-05-16 RX ADMIN — POTASSIUM CHLORIDE 20 MEQ: 1500 TABLET, EXTENDED RELEASE ORAL at 16:53

## 2020-05-16 RX ADMIN — SPIRONOLACTONE 12.5 MG: 25 TABLET ORAL at 07:57

## 2020-05-16 RX ADMIN — BUSPIRONE HYDROCHLORIDE 5 MG: 5 TABLET ORAL at 21:07

## 2020-05-16 RX ADMIN — Medication 10 ML: at 09:02

## 2020-05-16 ASSESSMENT — PAIN SCALES - GENERAL
PAINLEVEL_OUTOF10: 0

## 2020-05-16 NOTE — PROGRESS NOTES
depressed. Musculoskeletal:    Denies  myalgias, joint complaints or back pain. Integumentary:   Denies any rashes, ulcers, or excoriations. Denies bruising. Hematologic/Lymphatic:  Denies bruising or bleeding. Physical Exam:  I/O this shift:  In: 180 [P.O.:180]  Out: -     Intake/Output Summary (Last 24 hours) at 5/16/2020 1349  Last data filed at 5/16/2020 1045  Gross per 24 hour   Intake 540 ml   Output 725 ml   Net -185 ml   I/O last 3 completed shifts: In: 840 [P.O.:840]  Out: 725 [Urine:725]  Patient Vitals for the past 96 hrs (Last 3 readings):   Weight   05/16/20 0130 148 lb (67.1 kg)   05/15/20 0645 150 lb 1.6 oz (68.1 kg)   05/14/20 1137 145 lb (65.8 kg)       Vital Signs:   Blood pressure 109/63, pulse 93, temperature 97.7 °F (36.5 °C), temperature source Oral, resp. rate 20, height 5' 5\" (1.651 m), weight 148 lb (67.1 kg), SpO2 93 %. Ryanne Crook is a 69 y. o.  female who is alert, responsive, oriented to person, place, and time. General appearance:   Well preserved, alert, no distress. Head:  Normocephalic. No masses, lesions or tenderness. Eyes:  PERRLA. EOMI. Sclera clear. Buccal mucosa moist.  ENT:  Ears normal. Mucosa normal.  Neck:    Supple. Trachea midline. No thyromegaly. No JVD. No bruits. Heart:    Rhythm regular. Rhythm bradycardia cardiac. Prosthetic click heard  Lungs:    Symmetrical. Clear to auscultation bilaterally. No wheezes. No rhonchi. No rales. Abdomen:   Soft. Non-tender. Non-distended. Bowel sounds positive. No organomegaly or masses. No pain on palpation. Extremities:    Peripheral pulses present. No peripheral edema. No ulcers. No cyanosis. No clubbing. Neurologic:    Alert x 3. No focal deficit. Cranial nerves grossly intact. No focal weakness. Psych:   Behavior is normal. Mood appears normal. Speech is not rapid and/or pressured. Musculoskeletal:   Spine ROM normal. Muscular strength intact. Gait not assessed.   Integumentary:  No rashes

## 2020-05-16 NOTE — PROGRESS NOTES
Patient is seen in follow-up for CHF    Subjective:     Ms. Viet Manriquez is better today  Sitting up in bed no apparent distress    ROS:  CONSTITUTIONAL:  negative for  fevers, chills  HEENT:  negative for earaches, nasal congestion and epistaxis  RESPIRATORY:  negative for  dry cough, cough with sputum,wheezing and hemoptysis  GASTROINTESTINAL:  negative for nausea, vomiting  MUSCULOSKELETAL:  negative for  myalgias, arthralgias  NEUROLOGICAL:  negative for visual disturbance, dysphagia    Medication side effects: none    Scheduled Meds:   [Held by provider] metoprolol succinate  12.5 mg Oral BID    nitroglycerin  0.5 inch Topical 4 times per day    furosemide  40 mg Intravenous BID    spironolactone  12.5 mg Oral Daily    losartan  12.5 mg Oral Daily    busPIRone  5 mg Oral BID    levothyroxine  100 mcg Oral Daily    sodium chloride flush  10 mL Intravenous 2 times per day    metoprolol succinate  25 mg Oral Once     Continuous Infusions:  PRN Meds:sodium chloride flush, acetaminophen **OR** acetaminophen, polyethylene glycol, trimethobenzamide, perflutren lipid microspheres      Objective:      Physical Exam:   /63   Pulse 93   Temp 97.7 °F (36.5 °C) (Oral)   Resp 20   Ht 5' 5\" (1.651 m)   Wt 148 lb (67.1 kg)   SpO2 93%   BMI 24.63 kg/m²   CONSTITUTIONAL:  awake, alert, cooperative, no apparent distress, and appears stated age  HEAD:  normocepalic, without obvious abnormality, atraumatic  NECK:  Supple, symmetrical, trachea midline, no adenopathy, thyroid symmetric, not enlarged and no tenderness, skin normal  LUNGS:  No increased work of breathing, No accessory muscle use or intercostal retractions, good air exchange, clear to auscultation bilaterally, no crackles or wheezing  CARDIOVASCULAR: Laterally displaced PMI, irregularly irregular, mechanical valve sounds, 3/6 systolic murmur at the left lower sternal border, no edema, no JVD, no carotid bruit.   ABDOMEN:  Soft, nontender, no masses, no

## 2020-05-17 ENCOUNTER — HOSPITAL ENCOUNTER (INPATIENT)
Age: 70
LOS: 3 days | Discharge: HOME HEALTH | DRG: 280 | End: 2020-05-20
Attending: INTERNAL MEDICINE | Admitting: INTERNAL MEDICINE
Payer: MEDICARE

## 2020-05-17 VITALS
TEMPERATURE: 96.5 F | OXYGEN SATURATION: 94 % | HEIGHT: 65 IN | WEIGHT: 146.2 LBS | DIASTOLIC BLOOD PRESSURE: 55 MMHG | RESPIRATION RATE: 18 BRPM | BODY MASS INDEX: 24.36 KG/M2 | SYSTOLIC BLOOD PRESSURE: 113 MMHG | HEART RATE: 71 BPM

## 2020-05-17 PROBLEM — I49.5 SICK SINUS SYNDROME (HCC): Status: ACTIVE | Noted: 2020-05-17

## 2020-05-17 LAB
ALBUMIN SERPL-MCNC: 4.1 G/DL (ref 3.5–5.2)
ALP BLD-CCNC: 121 U/L (ref 35–104)
ALT SERPL-CCNC: 22 U/L (ref 0–32)
ANION GAP SERPL CALCULATED.3IONS-SCNC: 11 MMOL/L (ref 7–16)
APTT: 100.8 SEC (ref 24.5–35.1)
APTT: 39 SEC (ref 24.5–35.1)
APTT: 47.5 SEC (ref 24.5–35.1)
AST SERPL-CCNC: 29 U/L (ref 0–31)
BILIRUB SERPL-MCNC: 1.5 MG/DL (ref 0–1.2)
BUN BLDV-MCNC: 24 MG/DL (ref 8–23)
CALCIUM SERPL-MCNC: 9.3 MG/DL (ref 8.6–10.2)
CHLORIDE BLD-SCNC: 101 MMOL/L (ref 98–107)
CO2: 31 MMOL/L (ref 22–29)
CREAT SERPL-MCNC: 0.8 MG/DL (ref 0.5–1)
GFR AFRICAN AMERICAN: >60
GFR NON-AFRICAN AMERICAN: >60 ML/MIN/1.73
GLUCOSE BLD-MCNC: 95 MG/DL (ref 74–99)
HCT VFR BLD CALC: 41 % (ref 34–48)
HEMOGLOBIN: 12.4 G/DL (ref 11.5–15.5)
INR BLD: 1.9
MCH RBC QN AUTO: 25.1 PG (ref 26–35)
MCHC RBC AUTO-ENTMCNC: 30.2 % (ref 32–34.5)
MCV RBC AUTO: 83 FL (ref 80–99.9)
PDW BLD-RTO: 18.7 FL (ref 11.5–15)
PLATELET # BLD: 180 E9/L (ref 130–450)
PMV BLD AUTO: 10.7 FL (ref 7–12)
POTASSIUM SERPL-SCNC: 4 MMOL/L (ref 3.5–5)
PROTHROMBIN TIME: 21.4 SEC (ref 9.3–12.4)
RBC # BLD: 4.94 E12/L (ref 3.5–5.5)
SODIUM BLD-SCNC: 143 MMOL/L (ref 132–146)
TOTAL PROTEIN: 6.7 G/DL (ref 6.4–8.3)
WBC # BLD: 8.1 E9/L (ref 4.5–11.5)

## 2020-05-17 PROCEDURE — 2580000003 HC RX 258: Performed by: INTERNAL MEDICINE

## 2020-05-17 PROCEDURE — 85730 THROMBOPLASTIN TIME PARTIAL: CPT

## 2020-05-17 PROCEDURE — 80053 COMPREHEN METABOLIC PANEL: CPT

## 2020-05-17 PROCEDURE — 2700000000 HC OXYGEN THERAPY PER DAY

## 2020-05-17 PROCEDURE — 6360000002 HC RX W HCPCS: Performed by: INTERNAL MEDICINE

## 2020-05-17 PROCEDURE — 36415 COLL VENOUS BLD VENIPUNCTURE: CPT

## 2020-05-17 PROCEDURE — 85610 PROTHROMBIN TIME: CPT

## 2020-05-17 PROCEDURE — 2500000003 HC RX 250 WO HCPCS: Performed by: INTERNAL MEDICINE

## 2020-05-17 PROCEDURE — 99232 SBSQ HOSP IP/OBS MODERATE 35: CPT | Performed by: INTERNAL MEDICINE

## 2020-05-17 PROCEDURE — 6370000000 HC RX 637 (ALT 250 FOR IP): Performed by: INTERNAL MEDICINE

## 2020-05-17 PROCEDURE — 85027 COMPLETE CBC AUTOMATED: CPT

## 2020-05-17 PROCEDURE — 2140000000 HC CCU INTERMEDIATE R&B

## 2020-05-17 RX ORDER — SODIUM CHLORIDE 0.9 % (FLUSH) 0.9 %
10 SYRINGE (ML) INJECTION EVERY 12 HOURS SCHEDULED
Status: DISCONTINUED | OUTPATIENT
Start: 2020-05-17 | End: 2020-05-20 | Stop reason: HOSPADM

## 2020-05-17 RX ORDER — LOSARTAN POTASSIUM 25 MG/1
12.5 TABLET ORAL DAILY
Qty: 30 TABLET | Refills: 3 | Status: ON HOLD | DISCHARGE
Start: 2020-05-18 | End: 2020-05-20 | Stop reason: HOSPADM

## 2020-05-17 RX ORDER — ACETAMINOPHEN 650 MG/1
650 SUPPOSITORY RECTAL EVERY 6 HOURS PRN
Status: DISCONTINUED | OUTPATIENT
Start: 2020-05-17 | End: 2020-05-20 | Stop reason: HOSPADM

## 2020-05-17 RX ORDER — HEPARIN SODIUM 10000 [USP'U]/100ML
12 INJECTION, SOLUTION INTRAVENOUS CONTINUOUS
Status: CANCELLED | OUTPATIENT
Start: 2020-05-17

## 2020-05-17 RX ORDER — FUROSEMIDE 10 MG/ML
40 INJECTION INTRAMUSCULAR; INTRAVENOUS 2 TIMES DAILY
Status: ON HOLD | DISCHARGE
Start: 2020-05-17 | End: 2020-05-20 | Stop reason: HOSPADM

## 2020-05-17 RX ORDER — FUROSEMIDE 10 MG/ML
40 INJECTION INTRAMUSCULAR; INTRAVENOUS 2 TIMES DAILY
Status: DISCONTINUED | OUTPATIENT
Start: 2020-05-17 | End: 2020-05-19

## 2020-05-17 RX ORDER — SPIRONOLACTONE 25 MG/1
12.5 TABLET ORAL DAILY
Status: DISCONTINUED | OUTPATIENT
Start: 2020-05-18 | End: 2020-05-20

## 2020-05-17 RX ORDER — SODIUM CHLORIDE 0.9 % (FLUSH) 0.9 %
10 SYRINGE (ML) INJECTION PRN
Status: CANCELLED | OUTPATIENT
Start: 2020-05-17

## 2020-05-17 RX ORDER — LOSARTAN POTASSIUM 25 MG/1
12.5 TABLET ORAL DAILY
Status: CANCELLED | OUTPATIENT
Start: 2020-05-18

## 2020-05-17 RX ORDER — POLYETHYLENE GLYCOL 3350 17 G/17G
17 POWDER, FOR SOLUTION ORAL DAILY PRN
Status: CANCELLED | OUTPATIENT
Start: 2020-05-17

## 2020-05-17 RX ORDER — HEPARIN SODIUM 1000 [USP'U]/ML
30 INJECTION, SOLUTION INTRAVENOUS; SUBCUTANEOUS PRN
Status: CANCELLED | OUTPATIENT
Start: 2020-05-17

## 2020-05-17 RX ORDER — ACETAMINOPHEN 650 MG/1
650 SUPPOSITORY RECTAL EVERY 6 HOURS PRN
Status: CANCELLED | OUTPATIENT
Start: 2020-05-17

## 2020-05-17 RX ORDER — SPIRONOLACTONE 25 MG/1
12.5 TABLET ORAL DAILY
Status: CANCELLED | OUTPATIENT
Start: 2020-05-18

## 2020-05-17 RX ORDER — HEPARIN SODIUM 10000 [USP'U]/100ML
12 INJECTION, SOLUTION INTRAVENOUS CONTINUOUS
Status: DISCONTINUED | OUTPATIENT
Start: 2020-05-17 | End: 2020-05-17 | Stop reason: HOSPADM

## 2020-05-17 RX ORDER — HEPARIN SODIUM 10000 [USP'U]/100ML
12 INJECTION, SOLUTION INTRAVENOUS CONTINUOUS
Status: DISCONTINUED | OUTPATIENT
Start: 2020-05-17 | End: 2020-05-20

## 2020-05-17 RX ORDER — HEPARIN SODIUM 1000 [USP'U]/ML
60 INJECTION, SOLUTION INTRAVENOUS; SUBCUTANEOUS PRN
Status: ON HOLD | DISCHARGE
Start: 2020-05-17 | End: 2020-05-20 | Stop reason: HOSPADM

## 2020-05-17 RX ORDER — HEPARIN SODIUM 1000 [USP'U]/ML
30 INJECTION, SOLUTION INTRAVENOUS; SUBCUTANEOUS PRN
Status: DISCONTINUED | OUTPATIENT
Start: 2020-05-17 | End: 2020-05-17 | Stop reason: HOSPADM

## 2020-05-17 RX ORDER — METOPROLOL SUCCINATE 25 MG/1
25 TABLET, EXTENDED RELEASE ORAL ONCE
Qty: 30 TABLET | Refills: 3 | Status: ON HOLD | DISCHARGE
Start: 2020-05-17 | End: 2020-05-20 | Stop reason: HOSPADM

## 2020-05-17 RX ORDER — BUSPIRONE HYDROCHLORIDE 5 MG/1
5 TABLET ORAL 2 TIMES DAILY
Status: DISCONTINUED | OUTPATIENT
Start: 2020-05-17 | End: 2020-05-20 | Stop reason: HOSPADM

## 2020-05-17 RX ORDER — LEVOTHYROXINE SODIUM 0.1 MG/1
100 TABLET ORAL DAILY
Qty: 30 TABLET | Refills: 3 | DISCHARGE
Start: 2020-05-18 | End: 2021-01-01

## 2020-05-17 RX ORDER — ACETAMINOPHEN 325 MG/1
650 TABLET ORAL EVERY 6 HOURS PRN
Status: DISCONTINUED | OUTPATIENT
Start: 2020-05-17 | End: 2020-05-20 | Stop reason: HOSPADM

## 2020-05-17 RX ORDER — LORAZEPAM 0.5 MG/1
0.5 TABLET ORAL EVERY 4 HOURS PRN
Status: CANCELLED | OUTPATIENT
Start: 2020-05-17

## 2020-05-17 RX ORDER — LOSARTAN POTASSIUM 25 MG/1
12.5 TABLET ORAL DAILY
Status: DISCONTINUED | OUTPATIENT
Start: 2020-05-18 | End: 2020-05-20 | Stop reason: HOSPADM

## 2020-05-17 RX ORDER — LEVOTHYROXINE SODIUM 0.1 MG/1
100 TABLET ORAL DAILY
Status: DISCONTINUED | OUTPATIENT
Start: 2020-05-18 | End: 2020-05-20 | Stop reason: HOSPADM

## 2020-05-17 RX ORDER — BUSPIRONE HYDROCHLORIDE 5 MG/1
5 TABLET ORAL 2 TIMES DAILY
DISCHARGE
Start: 2020-05-17 | End: 2020-06-15

## 2020-05-17 RX ORDER — SODIUM CHLORIDE 0.9 % (FLUSH) 0.9 %
10 SYRINGE (ML) INJECTION PRN
Status: DISCONTINUED | OUTPATIENT
Start: 2020-05-17 | End: 2020-05-20 | Stop reason: HOSPADM

## 2020-05-17 RX ORDER — LORAZEPAM 0.5 MG/1
0.5 TABLET ORAL EVERY 4 HOURS PRN
Status: DISCONTINUED | OUTPATIENT
Start: 2020-05-17 | End: 2020-05-20 | Stop reason: HOSPADM

## 2020-05-17 RX ORDER — FUROSEMIDE 10 MG/ML
40 INJECTION INTRAMUSCULAR; INTRAVENOUS 2 TIMES DAILY
Status: CANCELLED | OUTPATIENT
Start: 2020-05-17

## 2020-05-17 RX ORDER — HEPARIN SODIUM 1000 [USP'U]/ML
30 INJECTION, SOLUTION INTRAVENOUS; SUBCUTANEOUS PRN
Status: DISCONTINUED | OUTPATIENT
Start: 2020-05-17 | End: 2020-05-20 | Stop reason: ALTCHOICE

## 2020-05-17 RX ORDER — POLYETHYLENE GLYCOL 3350 17 G/17G
17 POWDER, FOR SOLUTION ORAL DAILY PRN
Status: DISCONTINUED | OUTPATIENT
Start: 2020-05-17 | End: 2020-05-20 | Stop reason: HOSPADM

## 2020-05-17 RX ORDER — LEVOTHYROXINE SODIUM 0.1 MG/1
100 TABLET ORAL DAILY
Status: CANCELLED | OUTPATIENT
Start: 2020-05-18

## 2020-05-17 RX ORDER — ACETAMINOPHEN 325 MG/1
650 TABLET ORAL EVERY 6 HOURS PRN
Status: CANCELLED | OUTPATIENT
Start: 2020-05-17

## 2020-05-17 RX ORDER — HEPARIN SODIUM 1000 [USP'U]/ML
60 INJECTION, SOLUTION INTRAVENOUS; SUBCUTANEOUS ONCE
Status: COMPLETED | OUTPATIENT
Start: 2020-05-17 | End: 2020-05-17

## 2020-05-17 RX ORDER — BUSPIRONE HYDROCHLORIDE 5 MG/1
5 TABLET ORAL 2 TIMES DAILY
Status: CANCELLED | OUTPATIENT
Start: 2020-05-17

## 2020-05-17 RX ORDER — HEPARIN SODIUM 1000 [USP'U]/ML
60 INJECTION, SOLUTION INTRAVENOUS; SUBCUTANEOUS PRN
Status: DISCONTINUED | OUTPATIENT
Start: 2020-05-17 | End: 2020-05-20 | Stop reason: ALTCHOICE

## 2020-05-17 RX ORDER — HEPARIN SODIUM 1000 [USP'U]/ML
30 INJECTION, SOLUTION INTRAVENOUS; SUBCUTANEOUS PRN
Status: ON HOLD | DISCHARGE
Start: 2020-05-17 | End: 2020-05-20 | Stop reason: HOSPADM

## 2020-05-17 RX ORDER — POLYETHYLENE GLYCOL 3350 17 G/17G
17 POWDER, FOR SOLUTION ORAL DAILY PRN
Qty: 527 G | Refills: 1 | DISCHARGE
Start: 2020-05-17 | End: 2020-06-16

## 2020-05-17 RX ORDER — ACETAMINOPHEN 325 MG/1
650 TABLET ORAL EVERY 6 HOURS PRN
Qty: 120 TABLET | Refills: 3 | DISCHARGE
Start: 2020-05-17 | End: 2020-12-02

## 2020-05-17 RX ORDER — SODIUM CHLORIDE 0.9 % (FLUSH) 0.9 %
10 SYRINGE (ML) INJECTION EVERY 12 HOURS SCHEDULED
Status: CANCELLED | OUTPATIENT
Start: 2020-05-17

## 2020-05-17 RX ORDER — HEPARIN SODIUM 1000 [USP'U]/ML
60 INJECTION, SOLUTION INTRAVENOUS; SUBCUTANEOUS PRN
Status: DISCONTINUED | OUTPATIENT
Start: 2020-05-17 | End: 2020-05-17 | Stop reason: HOSPADM

## 2020-05-17 RX ORDER — SPIRONOLACTONE 25 MG/1
12.5 TABLET ORAL DAILY
Qty: 30 TABLET | Refills: 3 | Status: ON HOLD | DISCHARGE
Start: 2020-05-18 | End: 2020-05-20 | Stop reason: HOSPADM

## 2020-05-17 RX ORDER — HEPARIN SODIUM 10000 [USP'U]/100ML
12 INJECTION, SOLUTION INTRAVENOUS CONTINUOUS
Refills: 0 | Status: ON HOLD | DISCHARGE
Start: 2020-05-17 | End: 2020-05-20 | Stop reason: HOSPADM

## 2020-05-17 RX ORDER — HEPARIN SODIUM 1000 [USP'U]/ML
60 INJECTION, SOLUTION INTRAVENOUS; SUBCUTANEOUS PRN
Status: CANCELLED | OUTPATIENT
Start: 2020-05-17

## 2020-05-17 RX ADMIN — BUSPIRONE HYDROCHLORIDE 5 MG: 5 TABLET ORAL at 09:20

## 2020-05-17 RX ADMIN — HEPARIN SODIUM 3980 UNITS: 1000 INJECTION, SOLUTION INTRAVENOUS; SUBCUTANEOUS at 09:50

## 2020-05-17 RX ADMIN — HEPARIN SODIUM AND DEXTROSE 12 UNITS/KG/HR: 10000; 5 INJECTION INTRAVENOUS at 09:53

## 2020-05-17 RX ADMIN — Medication 10 ML: at 09:21

## 2020-05-17 RX ADMIN — FUROSEMIDE 40 MG: 10 INJECTION, SOLUTION INTRAMUSCULAR; INTRAVENOUS at 09:20

## 2020-05-17 RX ADMIN — DILTIAZEM HYDROCHLORIDE 5 MG/HR: 5 INJECTION INTRAVENOUS at 12:45

## 2020-05-17 RX ADMIN — FUROSEMIDE 40 MG: 10 INJECTION, SOLUTION INTRAMUSCULAR; INTRAVENOUS at 17:18

## 2020-05-17 RX ADMIN — DEXTROSE MONOHYDRATE 10 MG/HR: 50 INJECTION, SOLUTION INTRAVENOUS at 17:18

## 2020-05-17 RX ADMIN — SPIRONOLACTONE 12.5 MG: 25 TABLET ORAL at 09:20

## 2020-05-17 RX ADMIN — HEPARIN SODIUM 1990 UNITS: 1000 INJECTION, SOLUTION INTRAVENOUS; SUBCUTANEOUS at 21:25

## 2020-05-17 RX ADMIN — Medication 10 ML: at 20:57

## 2020-05-17 RX ADMIN — LOSARTAN POTASSIUM 12.5 MG: 25 TABLET ORAL at 09:46

## 2020-05-17 RX ADMIN — BUSPIRONE HYDROCHLORIDE 5 MG: 5 TABLET ORAL at 20:56

## 2020-05-17 RX ADMIN — HEPARIN SODIUM 12 UNITS/KG/HR: 10000 INJECTION, SOLUTION INTRAVENOUS at 21:26

## 2020-05-17 RX ADMIN — LEVOTHYROXINE SODIUM 100 MCG: 0.1 TABLET ORAL at 06:30

## 2020-05-17 ASSESSMENT — PAIN SCALES - GENERAL
PAINLEVEL_OUTOF10: 0

## 2020-05-17 NOTE — PROGRESS NOTES
Nurse to nurse report called to Abdias CORLEY. Pt[facility] tx [facility] with documented belongings. Pt tx to room 6524A.

## 2020-05-17 NOTE — PROGRESS NOTES
5/17/2020 1317  Last data filed at 5/17/2020 1142  Gross per 24 hour   Intake 540 ml   Output 2475 ml   Net -1935 ml   I/O last 3 completed shifts: In: 540 [P.O.:540]  Out: 1675 [Urine:1675]  Patient Vitals for the past 96 hrs (Last 3 readings):   Weight   05/17/20 0015 146 lb 3.2 oz (66.3 kg)   05/16/20 0130 148 lb (67.1 kg)   05/15/20 0645 150 lb 1.6 oz (68.1 kg)       Vital Signs:   Blood pressure (!) 113/55, pulse 71, temperature 96.5 °F (35.8 °C), temperature source Axillary, resp. rate 18, height 5' 5\" (1.651 m), weight 146 lb 3.2 oz (66.3 kg), SpO2 94 %. Jordyn Liao is a 69 y. o.  female who is alert, responsive, oriented to person, place, and time. General appearance:   Well preserved, alert, no distress. Head:  Normocephalic. No masses, lesions or tenderness. Eyes:  PERRLA. EOMI. Sclera clear. Buccal mucosa moist.  ENT:  Ears normal. Mucosa normal.  Neck:    Supple. Trachea midline. No thyromegaly. No JVD. No bruits. Heart:    Rhythm regular. Rhythm bradycardia cardiac. Prosthetic click heard  Lungs:    Symmetrical. Clear to auscultation bilaterally. No wheezes. No rhonchi. No rales. Abdomen:   Soft. Non-tender. Non-distended. Bowel sounds positive. No organomegaly or masses. No pain on palpation. Extremities:    Peripheral pulses present. No peripheral edema. No ulcers. No cyanosis. No clubbing. Neurologic:    Alert x 3. No focal deficit. Cranial nerves grossly intact. No focal weakness. Psych:   Behavior is normal. Mood appears normal. Speech is not rapid and/or pressured. Musculoskeletal:   Spine ROM normal. Muscular strength intact. Gait not assessed. Integumentary:  No rashes  Skin normal color and texture.   Genitalia/Breast:  Deferred      Allergy:  No Known Allergies     Medication:  Scheduled Meds:   [Held by provider] metoprolol succinate  12.5 mg Oral BID    furosemide  40 mg Intravenous BID    spironolactone  12.5 mg Oral Daily    losartan  12.5 mg Oral Daily    busPIRone  5 mg Oral BID    levothyroxine  100 mcg Oral Daily    sodium chloride flush  10 mL Intravenous 2 times per day    metoprolol succinate  25 mg Oral Once     Continuous Infusions:   heparin (porcine) 12 Units/kg/hr (05/17/20 0953)    dilTIAZem (CARDIZEM) 125 mg in dextrose 5% 125 mL infusion 5 mg/hr (05/17/20 1245)       Objective Data:  CBC:   Recent Labs     05/15/20  0732  05/16/20  0519 05/16/20  1139 05/17/20  0602   WBC 11.3  --  8.5  --  8.1   HGB 11.7   < > 11.6 12.1 12.4     --  175  --  180    < > = values in this interval not displayed. BMP:    Recent Labs     05/15/20  0732 05/16/20  0519 05/17/20  0602    142 143   K 3.7 3.4* 4.0    103 101   CO2 25 26 31*   BUN 23 24* 24*   CREATININE 0.8 0.7 0.8   GLUCOSE 91 87 95     CMP:    Lab Results   Component Value Date     05/17/2020    K 4.0 05/17/2020    K 4.5 05/14/2020     05/17/2020    CO2 31 05/17/2020    BUN 24 05/17/2020    CREATININE 0.8 05/17/2020    GFRAA >60 05/17/2020    LABGLOM >60 05/17/2020    GLUCOSE 95 05/17/2020    GLUCOSE 93 01/25/2012    PROT 6.7 05/17/2020    LABALBU 4.1 05/17/2020    LABALBU 4.4 01/25/2012    CALCIUM 9.3 05/17/2020    BILITOT 1.5 05/17/2020    ALKPHOS 121 05/17/2020    AST 29 05/17/2020    ALT 22 05/17/2020     Hepatic:   Recent Labs     05/15/20  0732 05/16/20  0519 05/17/20  0602   AST 34* 29 29   ALT 27 24 22   BILITOT 1.6* 1.4* 1.5*   ALKPHOS 116* 108* 121*     Troponin:   Recent Labs     05/14/20  1730 05/14/20  2207   TROPONINI 0.04*  0.04* 0.05*     BNP: No results for input(s): BNP in the last 72 hours.   Lipids:   Recent Labs     05/15/20  0732   CHOL 140   HDL 46     ABGs: No results found for: PHART, PO2ART, IRZ9ICH  INR:   Recent Labs     05/15/20  0732 05/16/20  0519 05/17/20  0602   INR 7.9* 4.5 1.9   PROTIME 91.2* 51.9* 21.4*     RAD: Cta Chest W Contrast    Result Date: 5/14/2020  EXAMINATION: CTA OF THE CHEST 5/14/2020 12:31 pm TECHNIQUE: CTA of the chest echocardiogram at 25%  · NSTEMI  · History of mitral and aortic valve replacement secondary to rheumatic disease  · Anticoagulated with Coumadin  · Supratherapeutic INR 7.4 on presentation now normal  · Hypertension  · Hypothyroidism  · Anxiety  · Prolonged QTc  · Atrial fibrillation with tachy/bradycardia syndrome    Plan:     · The patient appeared clinically improved. The heart rhythm still fluctuate with periods of tachycardia alternating with bradycardia. The patient will possibly need a pacemaker and possible defibrillator due to decreased ejection fraction 25%. --transfer to [facility] tomorrow. · Diuretics for symptomatic relief of congestive failure  · Supratherapeutic INR is present we will adjust Coumadin to keep INR between 2.5 and 3.5 due to mechanical valves--start on Heparin drip. · Closely monitor renal function  · Follow with other consultants at the present time    I reviewed the patient's past medical, surgical history and medication. Patient's medications were reviewed/continued/adjusted. Labs as ordered. Please see orders for further plan of care. Rhythm strips reviewed as well as consultant recommendations/notes and/or discussion. I reviewed the  course of events since last visit. More than 50% of my  time was spent at the bedside counseling and/or coordination of care with the patient and/or family with face to face contact. This time was spent reviewing notes and laboratory data, instructing and counseling the patient. Time I spent with the family or surrogate(s) is included only if the patient was incapable of providing the necessary information or participating in medical decisionsI also discussed the differential diagnosis and all of the proposed management plans with the patient and individuals accompanying the patient.     Av Bojorquez requires this high level of physician care and nursing in the [facility] due the complexity of decision management and chance of rapid decline or death.  Continued cardiac monitoring and higher level of nursing are required. I am ready available for decision making and intervention. I reviewed the relevant imaging studies and available reports. I also discussed the differential diagnosis and all of the proposed management plans with the patient and individuals accompanying the patient to this visit. I reviewed the relevant imaging studies and available reports. José Miguel Pagan DO, F.A.C.O.I.   On 5/17/2020  1:17 PM

## 2020-05-17 NOTE — PROGRESS NOTES
Pts. HR between 65-90. Per order parameters cardiziem gtt stopped. Dr. Sadie Davis notified. No new orders.

## 2020-05-18 ENCOUNTER — APPOINTMENT (OUTPATIENT)
Dept: GENERAL RADIOLOGY | Age: 70
DRG: 280 | End: 2020-05-18
Attending: INTERNAL MEDICINE
Payer: MEDICARE

## 2020-05-18 ENCOUNTER — ANESTHESIA (OUTPATIENT)
Dept: CARDIAC CATH/INVASIVE PROCEDURES | Age: 70
DRG: 280 | End: 2020-05-18
Payer: MEDICARE

## 2020-05-18 ENCOUNTER — ANESTHESIA EVENT (OUTPATIENT)
Dept: CARDIAC CATH/INVASIVE PROCEDURES | Age: 70
DRG: 280 | End: 2020-05-18
Payer: MEDICARE

## 2020-05-18 VITALS
SYSTOLIC BLOOD PRESSURE: 97 MMHG | RESPIRATION RATE: 23 BRPM | DIASTOLIC BLOOD PRESSURE: 64 MMHG | OXYGEN SATURATION: 96 %

## 2020-05-18 PROBLEM — I50.23 ACUTE ON CHRONIC SYSTOLIC CONGESTIVE HEART FAILURE (HCC): Status: ACTIVE | Noted: 2020-05-18

## 2020-05-18 PROBLEM — I42.9 CARDIOMYOPATHY (HCC): Status: ACTIVE | Noted: 2020-05-18

## 2020-05-18 LAB
ALBUMIN SERPL-MCNC: 3.9 G/DL (ref 3.5–5.2)
ALP BLD-CCNC: 111 U/L (ref 35–104)
ALT SERPL-CCNC: 22 U/L (ref 0–32)
ANION GAP SERPL CALCULATED.3IONS-SCNC: 10 MMOL/L (ref 7–16)
APTT: 117.8 SEC (ref 24.5–35.1)
APTT: 36.9 SEC (ref 24.5–35.1)
APTT: 69.8 SEC (ref 24.5–35.1)
AST SERPL-CCNC: 28 U/L (ref 0–31)
BILIRUB SERPL-MCNC: 1.1 MG/DL (ref 0–1.2)
BUN BLDV-MCNC: 36 MG/DL (ref 8–23)
CALCIUM SERPL-MCNC: 9.1 MG/DL (ref 8.6–10.2)
CHLORIDE BLD-SCNC: 99 MMOL/L (ref 98–107)
CO2: 27 MMOL/L (ref 22–29)
CREAT SERPL-MCNC: 1.1 MG/DL (ref 0.5–1)
GFR AFRICAN AMERICAN: 59
GFR NON-AFRICAN AMERICAN: 49 ML/MIN/1.73
GLUCOSE BLD-MCNC: 94 MG/DL (ref 74–99)
HCT VFR BLD CALC: 38.8 % (ref 34–48)
HEMOGLOBIN: 11.6 G/DL (ref 11.5–15.5)
INR BLD: 1.3
MCH RBC QN AUTO: 24.3 PG (ref 26–35)
MCHC RBC AUTO-ENTMCNC: 29.9 % (ref 32–34.5)
MCV RBC AUTO: 81.2 FL (ref 80–99.9)
PDW BLD-RTO: 18.8 FL (ref 11.5–15)
PLATELET # BLD: 181 E9/L (ref 130–450)
PMV BLD AUTO: 11.3 FL (ref 7–12)
POTASSIUM SERPL-SCNC: 4.1 MMOL/L (ref 3.5–5)
PRO-BNP: 433 PG/ML (ref 0–125)
PROTHROMBIN TIME: 14.9 SEC (ref 9.3–12.4)
RBC # BLD: 4.78 E12/L (ref 3.5–5.5)
SODIUM BLD-SCNC: 136 MMOL/L (ref 132–146)
TOTAL PROTEIN: 6.8 G/DL (ref 6.4–8.3)
WBC # BLD: 9.1 E9/L (ref 4.5–11.5)

## 2020-05-18 PROCEDURE — 2580000003 HC RX 258: Performed by: INTERNAL MEDICINE

## 2020-05-18 PROCEDURE — 93321 DOPPLER ECHO F-UP/LMTD STD: CPT

## 2020-05-18 PROCEDURE — 80053 COMPREHEN METABOLIC PANEL: CPT

## 2020-05-18 PROCEDURE — 85027 COMPLETE CBC AUTOMATED: CPT

## 2020-05-18 PROCEDURE — 83880 ASSAY OF NATRIURETIC PEPTIDE: CPT

## 2020-05-18 PROCEDURE — 93325 DOPPLER ECHO COLOR FLOW MAPG: CPT

## 2020-05-18 PROCEDURE — 2500000003 HC RX 250 WO HCPCS: Performed by: INTERNAL MEDICINE

## 2020-05-18 PROCEDURE — 36415 COLL VENOUS BLD VENIPUNCTURE: CPT

## 2020-05-18 PROCEDURE — 99223 1ST HOSP IP/OBS HIGH 75: CPT | Performed by: INTERNAL MEDICINE

## 2020-05-18 PROCEDURE — 5A2204Z RESTORATION OF CARDIAC RHYTHM, SINGLE: ICD-10-PCS | Performed by: INTERNAL MEDICINE

## 2020-05-18 PROCEDURE — 6360000002 HC RX W HCPCS: Performed by: INTERNAL MEDICINE

## 2020-05-18 PROCEDURE — 93005 ELECTROCARDIOGRAM TRACING: CPT | Performed by: INTERNAL MEDICINE

## 2020-05-18 PROCEDURE — 6370000000 HC RX 637 (ALT 250 FOR IP): Performed by: INTERNAL MEDICINE

## 2020-05-18 PROCEDURE — 2709999900 HC NON-CHARGEABLE SUPPLY

## 2020-05-18 PROCEDURE — APPSS60 APP SPLIT SHARED TIME 46-60 MINUTES: Performed by: NURSE PRACTITIONER

## 2020-05-18 PROCEDURE — 71046 X-RAY EXAM CHEST 2 VIEWS: CPT

## 2020-05-18 PROCEDURE — 85610 PROTHROMBIN TIME: CPT

## 2020-05-18 PROCEDURE — 6360000002 HC RX W HCPCS: Performed by: NURSE ANESTHETIST, CERTIFIED REGISTERED

## 2020-05-18 PROCEDURE — 92960 CARDIOVERSION ELECTRIC EXT: CPT | Performed by: INTERNAL MEDICINE

## 2020-05-18 PROCEDURE — 93312 ECHO TRANSESOPHAGEAL: CPT

## 2020-05-18 PROCEDURE — 2580000003 HC RX 258: Performed by: NURSE ANESTHETIST, CERTIFIED REGISTERED

## 2020-05-18 PROCEDURE — 2140000000 HC CCU INTERMEDIATE R&B

## 2020-05-18 PROCEDURE — B24BZZ4 ULTRASONOGRAPHY OF HEART WITH AORTA, TRANSESOPHAGEAL: ICD-10-PCS | Performed by: INTERNAL MEDICINE

## 2020-05-18 PROCEDURE — 85730 THROMBOPLASTIN TIME PARTIAL: CPT

## 2020-05-18 RX ORDER — METOPROLOL SUCCINATE 25 MG/1
25 TABLET, EXTENDED RELEASE ORAL ONCE
Status: COMPLETED | OUTPATIENT
Start: 2020-05-18 | End: 2020-05-18

## 2020-05-18 RX ORDER — PROPOFOL 10 MG/ML
INJECTION, EMULSION INTRAVENOUS PRN
Status: DISCONTINUED | OUTPATIENT
Start: 2020-05-18 | End: 2020-05-18 | Stop reason: SDUPTHER

## 2020-05-18 RX ORDER — METOPROLOL SUCCINATE 50 MG/1
50 TABLET, EXTENDED RELEASE ORAL 2 TIMES DAILY
Status: DISCONTINUED | OUTPATIENT
Start: 2020-05-19 | End: 2020-05-20 | Stop reason: HOSPADM

## 2020-05-18 RX ORDER — SODIUM CHLORIDE 9 MG/ML
INJECTION, SOLUTION INTRAVENOUS CONTINUOUS PRN
Status: DISCONTINUED | OUTPATIENT
Start: 2020-05-18 | End: 2020-05-18 | Stop reason: SDUPTHER

## 2020-05-18 RX ORDER — METOPROLOL SUCCINATE 25 MG/1
25 TABLET, EXTENDED RELEASE ORAL 2 TIMES DAILY
Status: DISCONTINUED | OUTPATIENT
Start: 2020-05-18 | End: 2020-05-18

## 2020-05-18 RX ADMIN — LEVOTHYROXINE SODIUM 100 MCG: 0.1 TABLET ORAL at 08:09

## 2020-05-18 RX ADMIN — DEXTROSE MONOHYDRATE 7.5 MG/HR: 50 INJECTION, SOLUTION INTRAVENOUS at 10:00

## 2020-05-18 RX ADMIN — SODIUM CHLORIDE, PRESERVATIVE FREE 10 ML: 5 INJECTION INTRAVENOUS at 08:11

## 2020-05-18 RX ADMIN — Medication 10 ML: at 10:27

## 2020-05-18 RX ADMIN — Medication 10 ML: at 20:31

## 2020-05-18 RX ADMIN — HEPARIN SODIUM 1990 UNITS: 1000 INJECTION, SOLUTION INTRAVENOUS; SUBCUTANEOUS at 04:28

## 2020-05-18 RX ADMIN — FUROSEMIDE 40 MG: 10 INJECTION, SOLUTION INTRAMUSCULAR; INTRAVENOUS at 19:05

## 2020-05-18 RX ADMIN — LOSARTAN POTASSIUM 12.5 MG: 25 TABLET, FILM COATED ORAL at 08:10

## 2020-05-18 RX ADMIN — METOPROLOL SUCCINATE 25 MG: 25 TABLET, EXTENDED RELEASE ORAL at 23:32

## 2020-05-18 RX ADMIN — SODIUM CHLORIDE: 9 INJECTION, SOLUTION INTRAVENOUS at 14:46

## 2020-05-18 RX ADMIN — DEXTROSE MONOHYDRATE 5 MG/HR: 50 INJECTION, SOLUTION INTRAVENOUS at 17:48

## 2020-05-18 RX ADMIN — FUROSEMIDE 40 MG: 10 INJECTION, SOLUTION INTRAMUSCULAR; INTRAVENOUS at 10:26

## 2020-05-18 RX ADMIN — METOPROLOL SUCCINATE 25 MG: 25 TABLET, EXTENDED RELEASE ORAL at 20:31

## 2020-05-18 RX ADMIN — HEPARIN SODIUM 14 UNITS/KG/HR: 10000 INJECTION, SOLUTION INTRAVENOUS at 04:30

## 2020-05-18 RX ADMIN — HEPARIN SODIUM 11 UNITS/KG/HR: 10000 INJECTION, SOLUTION INTRAVENOUS at 11:27

## 2020-05-18 RX ADMIN — PROPOFOL 200 MG: 10 INJECTION, EMULSION INTRAVENOUS at 15:10

## 2020-05-18 RX ADMIN — SPIRONOLACTONE 12.5 MG: 25 TABLET ORAL at 10:30

## 2020-05-18 RX ADMIN — DEXTROSE MONOHYDRATE 5 MG/HR: 50 INJECTION, SOLUTION INTRAVENOUS at 08:04

## 2020-05-18 RX ADMIN — HEPARIN SODIUM 11 UNITS/KG/HR: 10000 INJECTION, SOLUTION INTRAVENOUS at 19:22

## 2020-05-18 RX ADMIN — BUSPIRONE HYDROCHLORIDE 5 MG: 5 TABLET ORAL at 23:32

## 2020-05-18 RX ADMIN — BUSPIRONE HYDROCHLORIDE 5 MG: 5 TABLET ORAL at 08:10

## 2020-05-18 ASSESSMENT — LIFESTYLE VARIABLES: SMOKING_STATUS: 1

## 2020-05-18 ASSESSMENT — PAIN SCALES - GENERAL
PAINLEVEL_OUTOF10: 0

## 2020-05-18 NOTE — PROCEDURES
TRANSESOPHAGEAL ECHOCARDIOGRAPHY / CARDIOVERSION    CARDIOLOGIST: Dr. Jose Huynh: FRANTZ / Cardioversion    DATE OF PROCEDURE: 5/18/2020    HISTORY: Symptomatic atrial fibrillation    FRANTZ findings: Severe LV dysfunction, normal RV function, suboptimally visualized mechanical aortic and mitral prosthesis, no evidence of vegetations, no left atrial or left atrial appendage thrombus in residual appendage and moderate evidence of spontaneous echo contrast, no significant atherosclerosis of the aorta. TECHNIQUE: Risks, benefits and alternatives to FRANTZ guided DC cardioversion were explained to the patient at length, and the patient acknowledged understanding. The patient was in a stable fasting condition. Cardiac rhythm, arterial oxygen saturation and systemic blood pressure were continuously monitored on a beat to beat basis. The patient was sedated by the Department of Anesthesiology. After the procedure, the patient was observed and when alert, was discharged in stable condition. RESULTS:  Patch/Paddle Position: Anterior/posterior  Energy (Joules): 100/200/300  Initial Rhythm: Atrial flutter  Outcome Rhythm: Atrial flutter  COMPLICATIONS: None  CONCLUSION:  Transcient successful electrical cardioversion of atrial fibrillation with early recurrence of atrial tachycardia and approximately 5 second pause post 300 watt-second shock. Discussed with electrophysiology service    Alirio Owens, [address] [place] [address] Cardiology

## 2020-05-18 NOTE — H&P
Infuse 4 mLs intravenously 2 times daily  spironolactone (ALDACTONE) 25 MG tablet, Take 0.5 tablets by mouth daily  polyethylene glycol (GLYCOLAX) 17 g packet, Take 17 g by mouth daily as needed for Constipation  levothyroxine (SYNTHROID) 100 MCG tablet, Take 1 tablet by mouth Daily    Allergies:    Patient has no known allergies. Social History:    reports that she has been smoking cigarettes. She has a 4.50 pack-year smoking history. She has never used smokeless tobacco. She reports that she does not drink alcohol or use drugs. Family History:   family history is not on file. REVIEW OF SYSTEMS:  As above in the HPI, otherwise negative    PHYSICAL EXAM:    Vitals:  /68   Pulse 67   Temp 97.6 °F (36.4 °C) (Temporal)   Resp 15   Ht 5' 5\" (1.651 m)   Wt 147 lb 12.8 oz (67 kg)   SpO2 99%   BMI 24.60 kg/m²     General:  Awake, alert, oriented X 3. Well developed, well nourished, well groomed. No apparent distress. HEENT:  Normocephalic, atraumatic. Pupils equal, round, reactive to light. No scleral icterus. No conjunctival injection. Normal lips, teeth, and gums. No nasal discharge. Neck:  Supple  Heart:  RRR, no murmurs, gallops, rubs  Lungs:  CTA bilaterally, bilat symmetrical expansion, no wheeze, rales, or rhonchi  Abdomen:   Bowel sounds present, soft, nontender, no masses, no organomegaly, no peritoneal signs  Extremities:  No clubbing, cyanosis, or edema  Skin:  Warm and dry, no open lesions or rash  Neuro:  Cranial nerves 2-12 intact, no focal deficits  Breast: deferred  Rectal: deferred  Genitalia:  deferred    LABS:    CBC with Differential:    Lab Results   Component Value Date    WBC 9.1 05/18/2020    RBC 4.78 05/18/2020    HGB 11.6 05/18/2020    HCT 38.8 05/18/2020     05/18/2020    MCV 81.2 05/18/2020    MCH 24.3 05/18/2020    MCHC 29.9 05/18/2020    RDW 18.8 05/18/2020    SEGSPCT 81 01/25/2012    LYMPHOPCT 13.2 05/14/2020    MONOPCT 6.8 05/14/2020    BASOPCT 0.8 05/14/2020    MONOSABS 0.69 05/14/2020    LYMPHSABS 1.34 05/14/2020    EOSABS 0.01 05/14/2020    BASOSABS 0.08 05/14/2020     CMP:    Lab Results   Component Value Date     05/18/2020    K 4.1 05/18/2020    K 4.5 05/14/2020    CL 99 05/18/2020    CO2 27 05/18/2020    BUN 36 05/18/2020    CREATININE 1.1 05/18/2020    GFRAA 59 05/18/2020    LABGLOM 49 05/18/2020    GLUCOSE 94 05/18/2020    GLUCOSE 93 01/25/2012    PROT 6.8 05/18/2020    LABALBU 3.9 05/18/2020    LABALBU 4.4 01/25/2012    CALCIUM 9.1 05/18/2020    BILITOT 1.1 05/18/2020    ALKPHOS 111 05/18/2020    AST 28 05/18/2020    ALT 22 05/18/2020     Magnesium:    Lab Results   Component Value Date    MG 1.8 05/15/2020     Phosphorus:    Lab Results   Component Value Date    PHOS 3.6 05/15/2020     PT/INR:    Lab Results   Component Value Date    PROTIME 14.9 05/18/2020    PROTIME 40.2 08/31/2017    INR 1.3 05/18/2020     Last 3 Troponin:    Lab Results   Component Value Date    TROPONINI 0.05 05/14/2020    TROPONINI 0.04 05/14/2020    TROPONINI 0.04 05/14/2020     U/A:  No results found for: NITRITE, COLORU, PROTEINU, PHUR, LABCAST, WBCUA, RBCUA, MUCUS, TRICHOMONAS, YEAST, BACTERIA, CLARITYU, SPECGRAV, LEUKOCYTESUR, UROBILINOGEN, BILIRUBINUR, BLOODU, GLUCOSEU, AMORPHOUS  ABG:  No results found for: PH, PCO2, PO2, HCO3, BE, THGB, TCO2, O2SAT  HgBA1c:    Lab Results   Component Value Date    LABA1C 5.6 05/15/2020     FLP:    Lab Results   Component Value Date    TRIG 71 05/15/2020    HDL 46 05/15/2020    [address] 80 05/15/2020    LABVLDL 14 05/15/2020     TSH:    Lab Results   Component Value Date    TSH 3.470 05/14/2020       XR CHEST STANDARD (2 VW)   Final Result   Cardiomegaly with particularly left atrial enlargement                   ASSESSMENT:      Principal Problem:    Atrial flutter with rapid ventricular response (HCC)  Active Problems:    Acquired hypothyroidism    H/O aortic valve replacement    H/O mitral valve replacement    NSTEMI (non-ST

## 2020-05-18 NOTE — PLAN OF CARE
Problem: Safety/Fall Precautions  Goal: Patient will remain free of physical injury  Outcome: Met This Shift

## 2020-05-18 NOTE — ANESTHESIA PRE PROCEDURE
medications:    Current Facility-Administered Medications   Medication Dose Route Frequency Provider Last Rate Last Dose    acetaminophen (TYLENOL) tablet 650 mg  650 mg Oral Q6H PRN Norbert Dukes MD        Or   Yee acetaminophen (TYLENOL) suppository 650 mg  650 mg Rectal Q6H PRN Norbert Dukes MD        polyethylene glycol (GLYCOLAX) packet 17 g  17 g Oral Daily PRN Norbert Dukes MD        sodium chloride flush 0.9 % injection 10 mL  10 mL Intravenous 2 times per day Norbert Dukes MD   10 mL at 05/18/20 1027    sodium chloride flush 0.9 % injection 10 mL  10 mL Intravenous PRN Norbert Dukes MD   10 mL at 05/18/20 0811    busPIRone (BUSPAR) tablet 5 mg  5 mg Oral BID Norbert Dukes MD   5 mg at 05/18/20 0810    furosemide (LASIX) injection 40 mg  40 mg Intravenous BID Norbert Dukes MD   40 mg at 05/18/20 1026    heparin (porcine) injection 1,990 Units  30 Units/kg Intravenous PRN Norbert Dukes MD   1,990 Units at 05/18/20 0428    heparin (porcine) injection 3,980 Units  60 Units/kg Intravenous PRN Norbert Dukes MD        heparin 25,000 units in dextrose 5% 250 mL infusion  12 Units/kg/hr Intravenous Continuous Norbert Dukes MD 7.3 mL/hr at 05/18/20 1127 11 Units/kg/hr at 05/18/20 1127    levothyroxine (SYNTHROID) tablet 100 mcg  100 mcg Oral Daily Norbert Dukes MD   100 mcg at 05/18/20 0809    LORazepam (ATIVAN) tablet 0.5 mg  0.5 mg Oral Q4H PRN Norbert Dukes MD        losartan (COZAAR) tablet 12.5 mg  12.5 mg Oral Daily Norbert Dukes MD   12.5 mg at 05/18/20 0810    perflutren lipid microspheres (DEFINITY) injection 1.65 mg  1.5 mL Intravenous ONCE PRN Norbert Dukes MD        spironolactone (ALDACTONE) tablet 12.5 mg  12.5 mg Oral Daily Norbert Dukes MD   12.5 mg at 05/18/20 1030    trimethobenzamide (TIGAN) injection 200 mg  200 mg Intramuscular Q6H PRN Norbert Dukes MD        dilTIAZem 100 mg in dextrose 5 % 100 mL infusion (ADD-Cost)  5 mg/hr Intravenous Continuous Ze Merchant MD 5 mL/hr at 4.78 05/18/2020    HGB 11.6 05/18/2020    HCT 38.8 05/18/2020    MCV 81.2 05/18/2020    RDW 18.8 05/18/2020     05/18/2020       CMP:   Lab Results   Component Value Date     05/18/2020    K 4.1 05/18/2020    K 4.5 05/14/2020    CL 99 05/18/2020    CO2 27 05/18/2020    BUN 36 05/18/2020    CREATININE 1.1 05/18/2020    GFRAA 59 05/18/2020    LABGLOM 49 05/18/2020    GLUCOSE 94 05/18/2020    GLUCOSE 93 01/25/2012    PROT 6.8 05/18/2020    CALCIUM 9.1 05/18/2020    BILITOT 1.1 05/18/2020    ALKPHOS 111 05/18/2020    AST 28 05/18/2020    ALT 22 05/18/2020       POC Tests: No results for input(s): POCGLU, POCNA, POCK, POCCL, POCBUN, POCHEMO, POCHCT in the last 72 hours. Coags:   Lab Results   Component Value Date    PROTIME 14.9 05/18/2020    PROTIME 40.2 08/31/2017    INR 1.3 05/18/2020    APTT 117.8 05/18/2020       HCG (If Applicable): No results found for: PREGTESTUR, PREGSERUM, HCG, HCGQUANT     ABGs: No results found for: PHART, PO2ART, JGD3AIK, JDM5EWQ, BEART, M9PSVFFR     Type & Screen (If Applicable):  No results found for: LABABO, LABRH    Drug/Infectious Status (If Applicable):  No results found for: HIV, HEPCAB    COVID-19 Screening (If Applicable):   Lab Results   Component Value Date    COVID19 Not Detected 05/15/2020        echo 5/14/2020:   Left Ventricle   Ejection fraction is visually estimated at 25%. Overall ejection fraction severely decreased. Normal left ventricular wall   thickness. Left ventricle size is normal. Indeterminate diastolic   function. Right Ventricle   Normal right ventricle structure and function. Left Atrium   Left atrial volume index of 118 ml per meters squared BSA. Right Atrium   Moderately enlarged right atrium size. Mitral Valve   Mechanical prosthetic valve is present. Mean transmitral gradient 6.2   mmHg. Physiologic and/or trace mitral regurgitation is present.       Tricuspid Valve   The tricuspid valve appears structurally normal. Moderate and severe tricuspid regurgitation. Pulmonary hypertension is moderate. RVSP is 56 mmHg. Aortic Valve   Mechanical prosthetic valve in aortic position. The aortic valve a maximum   gradient of 15 mmHg and a mean gradient of 9 mmHg. Physiologic and/or   trace aortic regurgitation is noted. Pulmonic Valve   Pulmonic valve is structurally normal. Physiologic and/or trace pulmonic   regurgitation present. Pericardial Effusion   No evidence for hemodynamically significant pericardial effusion. Aorta   Normal aortic root and ascending aorta. Miscellaneous   Dilated Inferior Vena Cava. Conclusions      Summary   Ejection fraction is visually estimated at 25%. Overall ejection fraction severely decreased. Normal right ventricle structure and function. Left atrial volume index of 118 ml per meters squared BSA. Mechanical prosthetic valve in aortic position. The aortic valve a maximum gradient of 15 mmHg and a mean gradient of 9   mmHg. Physiologic and/or trace aortic regurgitation is noted. Mechanical prosthetic valve is present. Mean transmitral gradient 6.2 mmHg. Physiologic and/or trace mitral regurgitation is present. Moderate and severe tricuspid regurgitation. Pulmonary hypertension is moderate. RVSP is 56 mmHg. Anesthesia Evaluation  Patient summary reviewed no history of anesthetic complications:   Airway: Mallampati: I  TM distance: >3 FB   Neck ROM: full  Mouth opening: > = 3 FB Dental:    (+) upper dentures      Pulmonary:   (+) current smoker          Patient did not smoke on day of surgery.                  Cardiovascular:    (+) hypertension:, valvular problems/murmurs (s/p AVR and MVR):, past MI:, dysrhythmias: atrial fibrillation and atrial flutter, CHF:,         Rhythm: irregular                   ROS comment: cardiomyopathy     Neuro/Psych:   (+) depression/anxiety             GI/Hepatic/Renal:             Endo/Other:    (+) hypothyroidism, blood

## 2020-05-18 NOTE — CONSULTS
Cardiac Electrophysiology Consultation Note    Dionte Sepulveda  1950  Date of Service: 5/18/2020  PCP: Nima Arce DO      Reason for Consultation: Atrial Fibrillation    SUBJECTIVE: Dionte Sepulveda is a 70 yo female who I was asked to see in Cardiac Electrophysiology consultation for Afib. She is not known to [facility] EP or Cardiology. Notes in the system show that she has seen PCP for years and has only been maintained on wafarin and Atenolol 25mg bid. Her past history os significant for hypertension, hypothyroidism, aortic and mitral valve replacements back in 2000 and 2002 (done separately, both mechanical valves per the patient), atrial fibrillation/flutter on chronic Coumadin therapy, anxiety, cardiomyopathy ( TTE LVEF 35% 1/25/2012, LVEF 25% on 5/14/20). In terms of Atrial flutter - EKG from 9/11/18 shows Atrial flutter with CVR    She presented to [facility]/20 with worsening dyspnea, COVID was ruled out. She also had fatigue. She was seen by cardiology Dr Luis Acuna and given per notes (500 mcg IV x 1 dose, followed by 250 mcg IV x 2 doses every four hours. Start Toprol-XL 50 mg twice daily). She had 2.9 sec pause on monitor on 5/14/20 pm and 6.8 second pause. B-blockers were held and transfer to [facility] was recommended for ICD evaluation. She was also in decompensated CHF with INR 7.4 on presentation. She has since been undergoing diuresis. HR has been uncontrolled up to 160's in atrial flutter and she is currently on a cardizem gtt.       Patient Active Problem List    Diagnosis Date Noted    Sick sinus syndrome ([address] 05/17/2020    NSTEMI (non-ST elevated myocardial infarction) [facility] 05/14/2020    Atrial flutter with rapid ventricular response ([facility] 05/14/2020    Macular hole of right eye 03/07/2017    Acquired hypothyroidism 03/07/2017    H/O aortic valve replacement 03/07/2017    H/O mitral valve replacement 03/07/2017       Past Medical History: Diagnosis Date    H/O mitral valve replacement     Hypertension     Hypothyroidism     S/P aortic valve replacement        Past Surgical History:   Procedure Laterality Date    CARDIAC VALVE REPLACEMENT      ECHO COMPL W DOP COLOR FLOW  1/25/2012            No family history on file.     Social History     Tobacco Use    Smoking status: Current Some Day Smoker     Packs/day: 0.10     Years: 45.00     Pack years: 4.50     Types: Cigarettes    Smokeless tobacco: Never Used   Substance Use Topics    Alcohol use: No       Current Facility-Administered Medications   Medication Dose Route Frequency Provider Last Rate Last Dose    acetaminophen (TYLENOL) tablet 650 mg  650 mg Oral Q6H PRN Gwendolyn Jose MD        Or    acetaminophen (TYLENOL) suppository 650 mg  650 mg Rectal Q6H PRN Gwendolyn Jose MD        polyethylene glycol (GLYCOLAX) packet 17 g  17 g Oral Daily PRN Gwendolyn Jsoe MD        sodium chloride flush 0.9 % injection 10 mL  10 mL Intravenous 2 times per day Gwendolyn Jose MD   10 mL at 05/17/20 2057    sodium chloride flush 0.9 % injection 10 mL  10 mL Intravenous PRN Gwendolyn Jose MD   10 mL at 05/18/20 0811    busPIRone (BUSPAR) tablet 5 mg  5 mg Oral BID Gwendolyn Jose MD   5 mg at 05/18/20 0810    furosemide (LASIX) injection 40 mg  40 mg Intravenous BID Gwendolyn Jose MD   40 mg at 05/17/20 1718    heparin (porcine) injection 1,990 Units  30 Units/kg Intravenous PRN Gwendolyn Jose MD   1,990 Units at 05/18/20 0428    heparin (porcine) injection 3,980 Units  60 Units/kg Intravenous PRN Gwendolyn Jose MD        heparin 25,000 units in dextrose 5% 250 mL infusion  12 Units/kg/hr Intravenous Continuous Gwendolyn Jose MD 9.3 mL/hr at 05/18/20 0430 14 Units/kg/hr at 05/18/20 0430    levothyroxine (SYNTHROID) tablet 100 mcg  100 mcg Oral Daily Gwendolyn Jose MD   100 mcg at 05/18/20 0809    LORazepam (ATIVAN) tablet 0.5 mg  0.5 mg Oral Q4H PRN Gwendolyn Jose MD        losartan (COZAAR) tablet 12.5 mg  12.5 mg Oral Daily Argelia Collins MD   12.5 mg at 05/18/20 0810    perflutren lipid microspheres (DEFINITY) injection 1.65 mg  1.5 mL Intravenous ONCE PRN Argelia oCllins MD        spironolactone (ALDACTONE) tablet 12.5 mg  12.5 mg Oral Daily Argelia Collins MD        trimethobenzamide Joseph Christelle) injection 200 mg  200 mg Intramuscular Q6H PRN Argelia Collins MD        dilTIAZem 100 mg in dextrose 5 % 100 mL infusion (ADD-[place])  5 mg/hr Intravenous Continuous Argelia Collins MD 7.5 mL/hr at 05/18/20 1000 7.5 mg/hr at 05/18/20 1000        No Known Allergies    ROS:   Constitutional: Negative for fever, activity change and appetite change. Pos fatigue  HENT: Negative for epistaxis, difficulty swallowing. Eyes: Negative for blurred vision or double vision. Respiratory: Negative for cough, chest tightness, pos shortness of breath and wheezing. Cardiovascular: Negative for chest pain, palpitations and leg swelling. Gastrointestinal: Negative for abdominal pain, heartburn, or blood in stool. Genitourinary: Negative for hematuria, burning or frequency. Musculoskeletal: Negative for myalgias, stiffness, or swelling. Skin: Negative for rash, pain, or lumps. Neurological: Negative for syncope, seizures, or headaches. Psychiatric/Behavioral: Negative for confusion and agitation. The patient is not nervous/anxious. PHYSICAL EXAM:  Vitals:    05/18/20 0304 05/18/20 0659 05/18/20 0758 05/18/20 0827   BP: (!) 120/53  132/78 114/80   Pulse: 70  121 130   Resp: 15  16 16   Temp: 97.9 °F (36.6 °C)  97.6 °F (36.4 °C)    TempSrc: Temporal  Temporal    SpO2: 95%  99%    Weight:  147 lb 12.8 oz (67 kg)     Height:         Constitutional: Oriented to person, place, and time. Well-developed and cooperative. Head: Normocephalic and atraumatic. Cardiovascular: S1 S2 Pos, IRRR  Pulmonary/Chest: Effort normal and breath sounds normal. No respiratory distress. Abdominal: Soft.  Normal appearance  Musculoskeletal: Normal range of motion of all extremities, no muscle weakness. Neurological: Alert and oriented to person, place, and time. Skin: Skin is warm and dry. No bruising, no ecchymosis and no rash noted. Extremity: No clubbing or cyanosis. No edema. Psychiatric: Normal mood and affect. Thought content normal.     Pertinent Labs:  CBC:   Recent Labs     05/16/20  0519 05/16/20  1139 05/17/20  0602 05/18/20  0347   WBC 8.5  --  8.1 9.1   HGB 11.6 12.1 12.4 11.6   HCT 37.8 40.5 41.0 38.8     --  180 181     BMP:  Recent Labs     05/16/20  0519 05/17/20  0602 05/18/20  0347    143 136   K 3.4* 4.0 4.1    101 99   CO2 26 31* 27   BUN 24* 24* 36*   CREATININE 0.7 0.8 1.1*   CALCIUM 8.6 9.3 9.1     ABGs: No results found for: PHART, PO2ART, UYF3OFY  INR:   Recent Labs     05/16/20  0519 05/17/20  0602 05/18/20  0347   INR 4.5 1.9 1.3     BNP: No results for input(s): BNP in the last 72 hours. TSH:   Lab Results   Component Value Date    TSH 3.470 05/14/2020      Cardiac Injury Profile: No results for input(s): CKTOTAL, CKMB, CKMBINDEX, TROPONINI in the last 72 hours. Lipid Profile:   Lab Results   Component Value Date    TRIG 71 05/15/2020    HDL 46 05/15/2020    LDLCALC 80 05/15/2020    CHOL 140 05/15/2020      Hemoglobin A1C: No components found for: HGBA1C   Xray:   XR CHEST STANDARD (2 VW)   Final Result   Cardiomegaly with particularly left atrial enlargement                     Pertinent Cardiac Testing:  TTE 5/14/20   Summary   Ejection fraction is visually estimated at 25%. Overall ejection fraction severely decreased. Normal right ventricle structure and function. Left atrial volume index of 118 ml per meters squared BSA. Mechanical prosthetic valve in aortic position. The aortic valve a maximum gradient of 15 mmHg and a mean gradient of 9   mmHg. Physiologic and/or trace aortic regurgitation is noted. Mechanical prosthetic valve is present.    Mean transmitral gradient 6.2 mmHg.   Physiologic and/or trace mitral regurgitation is present. Moderate and severe tricuspid regurgitation. Pulmonary hypertension is moderate. RVSP is 56 mmHg. Telemetry5/18/2020: Uncontrolled atrial flutter    ECG 5/15/20 : Atrial flutter with variable block, QRS 88ms, QTC 471ms    Chest Xray 5/18/20       FINDINGS:     Heart is mildly enlarged particularly the left atrium. There is   sternotomy and valvular replacement. There are no acute infiltrates or   effusions. I have independently reviewed all of the ECGs and rhythm strips per above. I have personally reviewed the laboratory, cardiac diagnostic and radiographic testing as outlined above. I have reviewed previous records noted in [address]. Impression:    1. Atrial Flutter  Unclear duration. EKG from 2018 showed Atrial flutter. She probably has atypical flutter. She had up to 6 second pauses probably after Digoxin loading on presentation. She has been in uncontrolled atrial flutter and is on Cardizem gtt. - Recommend FRANTZ/ Cardioversion to see if we can achieve sinus rhythm  - We can gently initiate B-blockers and Amiodarone to help keep her in rhythm  - She does have Chronic cardiomyopathy dating back to at least 2012 when LVEF was 35%. - She will need an ischemic evaluation to rule out CAD  - She has not been on GDMT- she was only on low dose Atenolol 25 mg at baseline over the last few years  - She will need initiation with ARB/ACE-I, maybe entresto/aldactone for her chronic systolic CHF  - If we can achieve and maintain sinus rhythm, my goal would be lifevest for a few months to see if LVEF will improve and heart rhythm can be stable on a good medical regimen  - If bradycardia impedes our process to achieve rate control, we can consider pacing support    2.  Cardiomyopathy  She will need ischemic evaluation to rule out CAD  If negativem would recommend FRANTZ/cardioversion, initiation of Amiodarone/B-blocker to see if achieving nSR and putting her on a good CHF regimen will improve heart function. 3. Prosthetic Heart Valve  Aortic and mitral valve replacements back in 2000 and 2002  Maintained on Coumadin  Normal function on TTE    4. Acute on Chronic CHF  Continue diuresis  Losartan initiated today. Continue to up titrate. Consider aldactone      I have spent a total of 80 minutes with the patient and his/her family reviewing the above stated recommendations. A total of >50% of that time involved face-to-face time providing counseling and or coordination of care with the other providers. Thank you for allowing me to participate in your patient's care. [facility] Cardiac Electrophysiology  . [facility]    NOTE: This report was transcribed using voice recognition software. Every effort was made to ensure accuracy; however, inadvertent computerized transcription errors may be present. ADDENDUM 3:46pm    Patient is post FRANTZ - No RADHA thrombus. She was cardioverted 3 times with transient sinus rhythm and 5 sec pause but resumption of atrial flutter, heart rate 90's- 100 currently.     Plan : Start Toprol XL 25 mg bid and up titrate as BP and HR tolerates  - Monitor carefully on tele  If significant bradycardia - will plan for pacing support    -Continue ischemic workup

## 2020-05-18 NOTE — DISCHARGE SUMMARY
and  evaluated. Supratherapeutic INR was noted along with radiographic  findings showing that of congestive heart failure. She was admitted to  the hospital under the service of Dr. Valdo Leal and Dr. Everton Morales. PAST MEDICAL HISTORY:  Positive for the usual childhood diseases,  history of hypertension, and hypothyroidism. PHYSICAL EXAMINATION:  VITAL SIGNS:  Showed blood pressure to be elevated at 136/114, pulse 85,  respirations 22. Afebrile. Height 5 feet 5 inches, weight 145. GENERAL APPEARANCE:  This is a 69-year-old white female who was  dyspneic. HEENT:  Normal.  LUNGS:  Coarse. Diminished at the bases. HEART:  Irregular rhythm, compatible with atrial fibrillation, with  associated tachycardia. Prosthetic click was noted. ABDOMEN:  Soft. EXTREMITIES:  Revealed trace to mild edema. While the patient was in the hospital, she had the following laboratory  studies performed: The patient was instituted on a heparin drip. CTA  of the chest showed no evidence of any pulmonary embolism. Finding of  pulmonary edema was present with moderate right pleural effusion. Nonspecific finding of the gallbladder was noted. BUN and creatinine  were 24 and 0.8 respectively. Electrolytes were satisfactory. Chem-1  profile was normal.  CBC was stable. Troponin level had been elevated  at 0.04, suggesting probable non-ST-elevated myocardial infarction. For  further lab, please refer to chart. HOSPITAL COURSE OF STAY:  The patient was admitted directly to the  hospital to the immediate care unit. The patient was admitted under the  service of Dr. Valdo Leal and Dr. Everton Morales. The patient at this time was  initially held from Coumadin because of a supratherapeutic INR. The  patient, at this time, did have further workup performed including an  echocardiogram, which did show dysfunction noted at this time. The  patient was continued on her medications.   Cycled cardiac enzymes were  obtained along with cardiac evaluation. The patient was started on an  ACE inhibitor per Cardiology. Diuretics were administered for  symptomatic relief of the CHF. For the most part, the patient clinically improved. She did have a  problem with underlying tachycardia and bradycardia despite the addition  of beta-blocker therapy and Lanoxin. There were periods of bradycardia  alternating with tachycardia. The patient did relatively well because  of the evidence of left ventricular systolic dysfunction. We did  suggest possibly doing a stress test versus that of the need for a  biventricular pacemaker with possible cardioversion and a defibrillator. The patient did well. Her INR improved. She was started on a heparin  drip. Arrangements were made. The patient was transferred to [facility] for further cardiac evaluation. The patient was  transferred there on 05/17. At this time, her blood pressure was  113/55, pulse 71, respirations 18. She was afebrile, and further lab  work was satisfactory. The patient at the time of discharge was stable. She was discharged on  the following medications of Aldactone 12.5 daily, Toprol-XL 25 in the  morning and 12.5 at bedtime, Cozaar 12.5 daily, Ativan 0.5 q.8 p.r.n.,  Synthroid 100 mcg daily along with heparin infusion. The patient will  also continue BuSpar 5 mg b.i.d., Tylenol p.r.n. At the time of discharge, the patient will require further workup  including EPS evaluation, possible stress versus cardiac  catheterization. The patient is fully aware of the risks versus  benefits of her decision making. She will be resumed back on Coumadin  therapy because of prosthetic valves after further evaluation and  adjustment have been made. The patient, otherwise, at time of discharge  was stable. She was discharged there by ambulance in stable condition  with cardiac evaluation per [id number] [facility] Cardiology.   The patient will be  followed up with Dr. Lula Hutton upon discharge. More than 45 minutes were spent on the day of discharge coordinating  care, dictation, explanation of care along with discussion with other  consultants.         Adeola Faustin DO    D: 05/17/2020 14:58:42       T: 05/17/2020 15:02:15     [id number]  Job#: [id number]     Doc#: [id number]    CC:  DO Symone Blackwell DO

## 2020-05-19 ENCOUNTER — APPOINTMENT (OUTPATIENT)
Dept: NUCLEAR MEDICINE | Age: 70
DRG: 280 | End: 2020-05-19
Attending: INTERNAL MEDICINE
Payer: MEDICARE

## 2020-05-19 ENCOUNTER — APPOINTMENT (OUTPATIENT)
Dept: NON INVASIVE DIAGNOSTICS | Age: 70
DRG: 280 | End: 2020-05-19
Attending: INTERNAL MEDICINE
Payer: MEDICARE

## 2020-05-19 LAB
ALBUMIN SERPL-MCNC: 4.5 G/DL (ref 3.5–5.2)
ALP BLD-CCNC: 125 U/L (ref 35–104)
ALT SERPL-CCNC: 25 U/L (ref 0–32)
ANION GAP SERPL CALCULATED.3IONS-SCNC: 9 MMOL/L (ref 7–16)
APTT: 65.2 SEC (ref 24.5–35.1)
AST SERPL-CCNC: 35 U/L (ref 0–31)
BILIRUB SERPL-MCNC: 1.4 MG/DL (ref 0–1.2)
BUN BLDV-MCNC: 32 MG/DL (ref 8–23)
CALCIUM SERPL-MCNC: 9.5 MG/DL (ref 8.6–10.2)
CHLORIDE BLD-SCNC: 96 MMOL/L (ref 98–107)
CO2: 32 MMOL/L (ref 22–29)
CREAT SERPL-MCNC: 1 MG/DL (ref 0.5–1)
EKG ATRIAL RATE: 196 BPM
EKG P AXIS: 131 DEGREES
EKG Q-T INTERVAL: 360 MS
EKG QRS DURATION: 106 MS
EKG QTC CALCULATION (BAZETT): 459 MS
EKG R AXIS: 92 DEGREES
EKG T AXIS: 163 DEGREES
EKG VENTRICULAR RATE: 98 BPM
GFR AFRICAN AMERICAN: >60
GFR NON-AFRICAN AMERICAN: 55 ML/MIN/1.73
GLUCOSE BLD-MCNC: 103 MG/DL (ref 74–99)
HCT VFR BLD CALC: 41.8 % (ref 34–48)
HEMOGLOBIN: 12.7 G/DL (ref 11.5–15.5)
INR BLD: 1.1
LV EF: 48 %
LVEF MODALITY: NORMAL
MCH RBC QN AUTO: 24.4 PG (ref 26–35)
MCHC RBC AUTO-ENTMCNC: 30.4 % (ref 32–34.5)
MCV RBC AUTO: 80.4 FL (ref 80–99.9)
PDW BLD-RTO: 19.2 FL (ref 11.5–15)
PLATELET # BLD: 217 E9/L (ref 130–450)
PMV BLD AUTO: 11.6 FL (ref 7–12)
POTASSIUM SERPL-SCNC: 4.6 MMOL/L (ref 3.5–5)
PROTHROMBIN TIME: 12.1 SEC (ref 9.3–12.4)
RBC # BLD: 5.2 E12/L (ref 3.5–5.5)
SODIUM BLD-SCNC: 137 MMOL/L (ref 132–146)
TOTAL PROTEIN: 7.8 G/DL (ref 6.4–8.3)
WBC # BLD: 12.2 E9/L (ref 4.5–11.5)

## 2020-05-19 PROCEDURE — 6370000000 HC RX 637 (ALT 250 FOR IP): Performed by: INTERNAL MEDICINE

## 2020-05-19 PROCEDURE — 93016 CV STRESS TEST SUPVJ ONLY: CPT | Performed by: INTERNAL MEDICINE

## 2020-05-19 PROCEDURE — 93017 CV STRESS TEST TRACING ONLY: CPT

## 2020-05-19 PROCEDURE — 2580000003 HC RX 258: Performed by: INTERNAL MEDICINE

## 2020-05-19 PROCEDURE — 99233 SBSQ HOSP IP/OBS HIGH 50: CPT | Performed by: INTERNAL MEDICINE

## 2020-05-19 PROCEDURE — 2140000000 HC CCU INTERMEDIATE R&B

## 2020-05-19 PROCEDURE — 85730 THROMBOPLASTIN TIME PARTIAL: CPT

## 2020-05-19 PROCEDURE — 6360000002 HC RX W HCPCS: Performed by: NURSE PRACTITIONER

## 2020-05-19 PROCEDURE — 93010 ELECTROCARDIOGRAM REPORT: CPT | Performed by: INTERNAL MEDICINE

## 2020-05-19 PROCEDURE — 36415 COLL VENOUS BLD VENIPUNCTURE: CPT

## 2020-05-19 PROCEDURE — 3430000000 HC RX DIAGNOSTIC RADIOPHARMACEUTICAL: Performed by: RADIOLOGY

## 2020-05-19 PROCEDURE — 80053 COMPREHEN METABOLIC PANEL: CPT

## 2020-05-19 PROCEDURE — A9500 TC99M SESTAMIBI: HCPCS | Performed by: RADIOLOGY

## 2020-05-19 PROCEDURE — 85610 PROTHROMBIN TIME: CPT

## 2020-05-19 PROCEDURE — 99232 SBSQ HOSP IP/OBS MODERATE 35: CPT | Performed by: INTERNAL MEDICINE

## 2020-05-19 PROCEDURE — 85027 COMPLETE CBC AUTOMATED: CPT

## 2020-05-19 PROCEDURE — 93018 CV STRESS TEST I&R ONLY: CPT | Performed by: INTERNAL MEDICINE

## 2020-05-19 PROCEDURE — 78452 HT MUSCLE IMAGE SPECT MULT: CPT

## 2020-05-19 RX ORDER — DIGOXIN 125 MCG
125 TABLET ORAL DAILY
Status: DISCONTINUED | OUTPATIENT
Start: 2020-05-19 | End: 2020-05-20 | Stop reason: HOSPADM

## 2020-05-19 RX ORDER — SODIUM CHLORIDE 0.9 % (FLUSH) 0.9 %
10 SYRINGE (ML) INJECTION PRN
Status: DISCONTINUED | OUTPATIENT
Start: 2020-05-19 | End: 2020-05-20 | Stop reason: HOSPADM

## 2020-05-19 RX ADMIN — BUSPIRONE HYDROCHLORIDE 5 MG: 5 TABLET ORAL at 09:53

## 2020-05-19 RX ADMIN — DIGOXIN 125 MCG: 125 TABLET ORAL at 19:03

## 2020-05-19 RX ADMIN — REGADENOSON 0.4 MG: 0.08 INJECTION, SOLUTION INTRAVENOUS at 13:43

## 2020-05-19 RX ADMIN — Medication 10 ML: at 20:45

## 2020-05-19 RX ADMIN — ACETAMINOPHEN 650 MG: 325 TABLET, FILM COATED ORAL at 06:03

## 2020-05-19 RX ADMIN — METOPROLOL SUCCINATE 50 MG: 50 TABLET, EXTENDED RELEASE ORAL at 20:45

## 2020-05-19 RX ADMIN — LOSARTAN POTASSIUM 12.5 MG: 25 TABLET, FILM COATED ORAL at 09:53

## 2020-05-19 RX ADMIN — Medication 11 MILLICURIE: at 11:32

## 2020-05-19 RX ADMIN — Medication 10 ML: at 09:53

## 2020-05-19 RX ADMIN — LEVOTHYROXINE SODIUM 100 MCG: 0.1 TABLET ORAL at 06:03

## 2020-05-19 RX ADMIN — Medication 35 MILLICURIE: at 14:39

## 2020-05-19 RX ADMIN — SPIRONOLACTONE 12.5 MG: 25 TABLET ORAL at 09:53

## 2020-05-19 RX ADMIN — BUSPIRONE HYDROCHLORIDE 5 MG: 5 TABLET ORAL at 20:45

## 2020-05-19 RX ADMIN — METOPROLOL SUCCINATE 50 MG: 50 TABLET, EXTENDED RELEASE ORAL at 09:53

## 2020-05-19 ASSESSMENT — PAIN SCALES - GENERAL
PAINLEVEL_OUTOF10: 0
PAINLEVEL_OUTOF10: 7
PAINLEVEL_OUTOF10: 0
PAINLEVEL_OUTOF10: 4

## 2020-05-19 ASSESSMENT — PAIN DESCRIPTION - FREQUENCY: FREQUENCY: INTERMITTENT

## 2020-05-19 ASSESSMENT — PAIN DESCRIPTION - PAIN TYPE: TYPE: ACUTE PAIN

## 2020-05-19 ASSESSMENT — PAIN DESCRIPTION - DESCRIPTORS: DESCRIPTORS: ACHING;DISCOMFORT;SORE

## 2020-05-19 ASSESSMENT — PAIN DESCRIPTION - LOCATION: LOCATION: BACK

## 2020-05-19 ASSESSMENT — PAIN DESCRIPTION - ORIENTATION: ORIENTATION: MID;LOWER

## 2020-05-19 ASSESSMENT — PAIN DESCRIPTION - ONSET: ONSET: AWAKENED FROM SLEEP

## 2020-05-19 NOTE — PROGRESS NOTES
PROGRESS NOTE     CARDIOLOGY    Chief complaint: Seen today for follow up, management & recommendations for cardiomyopathy    She denies chest pain or shortness of breath today. She was lying flat in bed. She was comfortable and in no distress. Wt Readings from Last 3 Encounters:   05/19/20 144 lb 12.8 oz (65.7 kg)   05/17/20 146 lb 3.2 oz (66.3 kg)   01/15/20 152 lb (68.9 kg)     Temp Readings from Last 3 Encounters:   05/19/20 98 °F (36.7 °C) (Oral)   05/17/20 96.5 °F (35.8 °C) (Axillary)   05/14/20 97.2 °F (36.2 °C) (Temporal)     BP Readings from Last 3 Encounters:   05/19/20 (!) 98/0   05/18/20 97/64   05/17/20 (!) 113/55     Pulse Readings from Last 3 Encounters:   05/19/20 93   05/17/20 71   05/14/20 138         Intake/Output Summary (Last 24 hours) at 5/19/2020 1120  Last data filed at 5/19/2020 9798  Gross per 24 hour   Intake 663 ml   Output 2450 ml   Net -1787 ml       Recent Labs     05/17/20  0602 05/18/20  0347 05/19/20  0616   WBC 8.1 9.1 12.2*   HGB 12.4 11.6 12.7   HCT 41.0 38.8 41.8   MCV 83.0 81.2 80.4    181 217     Recent Labs     05/17/20  0602 05/18/20  0347 05/19/20  0616    136 137   K 4.0 4.1 4.6    99 96*   CO2 31* 27 32*   BUN 24* 36* 32*   CREATININE 0.8 1.1* 1.0     Recent Labs     05/17/20  0602 05/18/20  0347 05/19/20  0616   PROTIME 21.4* 14.9* 12.1   INR 1.9 1.3 1.1     No results for input(s): CKTOTAL, CKMB, CKMBINDEX, TROPONINI in the last 72 hours. No results for input(s): BNP in the last 72 hours. No results for input(s): CHOL, HDL, TRIG in the last 72 hours.     Invalid input(s): CHOLHDLR, LDLCALCU      regadenoson (LEXISCAN) injection 0.4 mg, ONCE PRN  metoprolol succinate (TOPROL XL) extended release tablet 50 mg, BID  acetaminophen (TYLENOL) tablet 650 mg, Q6H PRN    Or  acetaminophen (TYLENOL) suppository 650 mg, Q6H PRN  polyethylene glycol (GLYCOLAX) packet 17 g, Daily PRN  sodium chloride flush 0.9 % injection 10 mL, 2 times per day  sodium chloride flush 0.9 % injection 10 mL, PRN  busPIRone (BUSPAR) tablet 5 mg, BID  furosemide (LASIX) injection 40 mg, BID  heparin (porcine) injection 1,990 Units, PRN  heparin (porcine) injection 3,980 Units, PRN  heparin 25,000 units in dextrose 5% 250 mL infusion, Continuous  levothyroxine (SYNTHROID) tablet 100 mcg, Daily  LORazepam (ATIVAN) tablet 0.5 mg, Q4H PRN  losartan (COZAAR) tablet 12.5 mg, Daily  perflutren lipid microspheres (DEFINITY) injection 1.65 mg, ONCE PRN  spironolactone (ALDACTONE) tablet 12.5 mg, Daily  trimethobenzamide (TIGAN) injection 200 mg, Q6H PRN  dilTIAZem 100 mg in dextrose 5 % 100 mL infusion (ADD-[place]), Continuous        Review of systems:     Heart: as above   Lungs: as above   Eyes: denies changes in vision or discharge. Ears: denies changes in hearing or pain. Nose: denies epistaxis or masses   Throat: denies sore throat or trouble swallowing. Neuro: denies numbness, tingling, tremors. Skin: denies rashes or itching. : denies hematuria, dysuria   GI: denies vomiting, diarrhea   Psych: denies mood changed, anxiety, depression. Physical exam:    Constitutional: A&O x3, communicates well, no acute distress. Eyes: extraocular muscles intact, PERRL. Normal lids & conjunctiva. No icterus. ENT: clear, no bleeding. No external masses. Lips normal formation. Neck: supple, full ROM, no JVD, no bruits, no lymphadenopathy. No masses. trachea midline. Heart: irregular rate & rhythm, tach S1 & S2, I/VI (normal physiologic) systolic murmur. No heave. Lungs: CTA. No accessory muscles. Abd: soft, non-tender. Normal bowel sounds. Neuro: Full ROM X 4, EOMI, no tremors. EXT: No bilateral lower extremity edema  Skin: warm, dry, intact. Good turgor. Psych: A&O x 3, normal behavior, not anxious. Assessment/Recommendations  1. Cardiomyopathy. Ejection fraction 25%. Euvolemic on today's exam.  No room for medication titration today.   2. Mechanical AVR and MVR. 3. Rheumatic fever as a child. 4. Atrial fibrillation. Atrial flutter today with a heart rate in the 70s. EP is on the case. Failed cardioversion yesterday. Planning for ICD. 5. Elevated troponin in the setting of cardiomyopathy with A. fib RVR. Probable type II non-ST elevation myocardial infarction. No symptoms today. Stress test today for risk stratification. 6. Hypothyroidism. 7. Hypertension well controlled today. 8. Noncompliance.

## 2020-05-19 NOTE — PROGRESS NOTES
Cardiac Electrophysiology Consultation Note    Bharath Nieto  1950  Date of Service: 5/19/2020  PCP: Emerson Cisneros DO      Reason for Consultation: Atrial Fibrillation    SUBJECTIVE: Bharath Nieto is a 70 yo female who I was asked to see in Cardiac Electrophysiology consultation for Afib. She is not known to [facility] or Cardiology. Notes in the system show that she has seen PCP for years and has only been maintained on wafarin and Atenolol 25mg bid. Her past history os significant for hypertension, hypothyroidism, aortic and mitral valve replacements back in 2000 and 2002 (done separately, both mechanical valves per the patient), atrial fibrillation/flutter on chronic Coumadin therapy, anxiety, cardiomyopathy ( TTE LVEF 35% 1/25/2012, LVEF 25% on 5/14/20). In terms of Atrial flutter - EKG from 9/11/18 shows Atrial flutter with CVR    She presented to [facility] 5/14/20 with worsening dyspnea, COVID was ruled out. She also had fatigue. She was seen by cardiology Dr Denilson Harper and given per notes (500 mcg IV x 1 dose, followed by 250 mcg IV x 2 doses every four hours. Start Toprol-XL 50 mg twice daily). She had 2.9 sec pause on monitor on 5/14/20 pm and 6.8 second pause. B-blockers were held and transfer to [address] [place] [address] was recommended for ICD evaluation. She was also in decompensated CHF with INR 7.4 on presentation. She has since been undergoing diuresis. HR has been uncontrolled up to 160's in atrial flutter and she is currently on a cardizem gtt. 5/19/20 : she feels well, just tired.  No chest pain or sob    Patient Active Problem List    Diagnosis Date Noted    Acute on chronic systolic congestive heart failure ([address] 05/18/2020    Cardiomyopathy ([address] 05/18/2020    Sick sinus syndrome ([address] 05/17/2020    NSTEMI (non-ST elevated myocardial infarction) ([address] 05/14/2020    Atrial flutter with rapid ventricular response ([address] 05/14/2020    Macular hole of right 05/19/20 0745 05/19/20 0930   BP: (!) 101/58  (!) 95/53 (!) 98/0   Pulse: 64  61 93   Resp: 16  16    Temp: 98.3 °F (36.8 °C)  98 °F (36.7 °C)    TempSrc: Oral  Oral    SpO2: 96%  92%    Weight:  144 lb 12.8 oz (65.7 kg)     Height:         Constitutional: Oriented to person, place, and time. Well-developed and cooperative. Head: Normocephalic and atraumatic. Cardiovascular: S1 S2 Pos, IRRR  Pulmonary/Chest: Effort normal and breath sounds normal. No respiratory distress. Abdominal: Soft. Normal appearance  Musculoskeletal: Normal range of motion of all extremities, no muscle weakness. Neurological: Alert and oriented to person, place, and time. Skin: Skin is warm and dry. No bruising, no ecchymosis and no rash noted. Extremity: No clubbing or cyanosis. No edema. Psychiatric: Normal mood and affect. Thought content normal.     Pertinent Labs:  CBC:   Recent Labs     05/17/20  0602 05/18/20 0347 05/19/20  0616   WBC 8.1 9.1 12.2*   HGB 12.4 11.6 12.7   HCT 41.0 38.8 41.8    181 217     BMP:  Recent Labs     05/17/20  0602 05/18/20  0347 05/19/20  0616    136 137   K 4.0 4.1 4.6    99 96*   CO2 31* 27 32*   BUN 24* 36* 32*   CREATININE 0.8 1.1* 1.0   CALCIUM 9.3 9.1 9.5     ABGs: No results found for: PHART, PO2ART, [id number]  INR:   Recent Labs     05/17/20  0602 05/18/20  0347 05/19/20  0616   INR 1.9 1.3 1.1     BNP: No results for input(s): BNP in the last 72 hours. TSH:   Lab Results   Component Value Date    TSH 3.470 05/14/2020      Cardiac Injury Profile: No results for input(s): CKTOTAL, CKMB, CKMBINDEX, TROPONINI in the last 72 hours.    Lipid Profile:   Lab Results   Component Value Date    TRIG 71 05/15/2020    HDL 46 05/15/2020    LDLCALC 80 05/15/2020    CHOL 140 05/15/2020      Hemoglobin A1C: No components found for: HGBA1C   Xray:   XR CHEST STANDARD (2 VW)   Final Result   Cardiomegaly with particularly left atrial enlargement               [facility] Cardiac Stress Test

## 2020-05-19 NOTE — PROGRESS NOTES
*      Plan:    69-year-old female history of aortic and mitral valve replacement admitted to [facility] with a flutter. Found to have non STEMI and acute on chronic systolic CHF transferred to [address] [place] [address] for further evaluation and treatment.   Status post FRANTZ/unsuccessful cardioversion 5/18    Heparin drip  Diltiazem drip  Toprol  Losartan  --GDMT  IV Lasix--  Nuclear stress pending  Cardiology Consult appreciated   EP Consult appreciated   PT/OT  DVT PPx  DC planning        Electronically signed by Tracee Aguilar MD on 5/19/2020 at 9:03 AM

## 2020-05-20 VITALS
DIASTOLIC BLOOD PRESSURE: 62 MMHG | RESPIRATION RATE: 20 BRPM | BODY MASS INDEX: 24.19 KG/M2 | HEART RATE: 120 BPM | HEIGHT: 65 IN | WEIGHT: 145.2 LBS | SYSTOLIC BLOOD PRESSURE: 98 MMHG | TEMPERATURE: 98.1 F | OXYGEN SATURATION: 94 %

## 2020-05-20 LAB
ALBUMIN SERPL-MCNC: 4.3 G/DL (ref 3.5–5.2)
ALP BLD-CCNC: 112 U/L (ref 35–104)
ALT SERPL-CCNC: 25 U/L (ref 0–32)
ANION GAP SERPL CALCULATED.3IONS-SCNC: 13 MMOL/L (ref 7–16)
APTT: 32.4 SEC (ref 24.5–35.1)
AST SERPL-CCNC: 33 U/L (ref 0–31)
BILIRUB SERPL-MCNC: 1.2 MG/DL (ref 0–1.2)
BUN BLDV-MCNC: 33 MG/DL (ref 8–23)
CALCIUM SERPL-MCNC: 9.6 MG/DL (ref 8.6–10.2)
CHLORIDE BLD-SCNC: 94 MMOL/L (ref 98–107)
CO2: 25 MMOL/L (ref 22–29)
CREAT SERPL-MCNC: 0.9 MG/DL (ref 0.5–1)
GFR AFRICAN AMERICAN: >60
GFR NON-AFRICAN AMERICAN: >60 ML/MIN/1.73
GLUCOSE BLD-MCNC: 95 MG/DL (ref 74–99)
HCT VFR BLD CALC: 41.1 % (ref 34–48)
HEMOGLOBIN: 12.4 G/DL (ref 11.5–15.5)
INR BLD: 1
MAGNESIUM: 2.5 MG/DL (ref 1.6–2.6)
MCH RBC QN AUTO: 24.6 PG (ref 26–35)
MCHC RBC AUTO-ENTMCNC: 30.2 % (ref 32–34.5)
MCV RBC AUTO: 81.4 FL (ref 80–99.9)
PDW BLD-RTO: 19.3 FL (ref 11.5–15)
PLATELET # BLD: 211 E9/L (ref 130–450)
PMV BLD AUTO: 12 FL (ref 7–12)
POTASSIUM SERPL-SCNC: 4.6 MMOL/L (ref 3.5–5)
PROTHROMBIN TIME: 11.7 SEC (ref 9.3–12.4)
RBC # BLD: 5.05 E12/L (ref 3.5–5.5)
SODIUM BLD-SCNC: 132 MMOL/L (ref 132–146)
TOTAL PROTEIN: 7.5 G/DL (ref 6.4–8.3)
WBC # BLD: 9.7 E9/L (ref 4.5–11.5)

## 2020-05-20 PROCEDURE — 6360000002 HC RX W HCPCS: Performed by: INTERNAL MEDICINE

## 2020-05-20 PROCEDURE — 85730 THROMBOPLASTIN TIME PARTIAL: CPT

## 2020-05-20 PROCEDURE — 80053 COMPREHEN METABOLIC PANEL: CPT

## 2020-05-20 PROCEDURE — 6370000000 HC RX 637 (ALT 250 FOR IP): Performed by: INTERNAL MEDICINE

## 2020-05-20 PROCEDURE — 2580000003 HC RX 258: Performed by: INTERNAL MEDICINE

## 2020-05-20 PROCEDURE — 99232 SBSQ HOSP IP/OBS MODERATE 35: CPT | Performed by: NURSE PRACTITIONER

## 2020-05-20 PROCEDURE — 99233 SBSQ HOSP IP/OBS HIGH 50: CPT | Performed by: INTERNAL MEDICINE

## 2020-05-20 PROCEDURE — 85610 PROTHROMBIN TIME: CPT

## 2020-05-20 PROCEDURE — 83735 ASSAY OF MAGNESIUM: CPT

## 2020-05-20 PROCEDURE — 85027 COMPLETE CBC AUTOMATED: CPT

## 2020-05-20 PROCEDURE — 36415 COLL VENOUS BLD VENIPUNCTURE: CPT

## 2020-05-20 RX ORDER — DIGOXIN 125 MCG
125 TABLET ORAL DAILY
Qty: 30 TABLET | Refills: 3 | Status: SHIPPED | OUTPATIENT
Start: 2020-05-21 | End: 2020-06-19

## 2020-05-20 RX ORDER — WARFARIN SODIUM 5 MG/1
10 TABLET ORAL ONCE
Status: COMPLETED | OUTPATIENT
Start: 2020-05-20 | End: 2020-05-20

## 2020-05-20 RX ORDER — WARFARIN SODIUM 10 MG/1
10 TABLET ORAL DAILY
Qty: 30 TABLET | Refills: 1 | Status: SHIPPED | OUTPATIENT
Start: 2020-05-20 | End: 2020-12-02

## 2020-05-20 RX ORDER — METOPROLOL SUCCINATE 50 MG/1
50 TABLET, EXTENDED RELEASE ORAL 2 TIMES DAILY
Qty: 30 TABLET | Refills: 3 | Status: SHIPPED | OUTPATIENT
Start: 2020-05-20 | End: 2020-05-29 | Stop reason: DRUGHIGH

## 2020-05-20 RX ORDER — LOSARTAN POTASSIUM 25 MG/1
12.5 TABLET ORAL DAILY
Qty: 30 TABLET | Refills: 3 | Status: SHIPPED | OUTPATIENT
Start: 2020-05-21 | End: 2020-05-28 | Stop reason: ALTCHOICE

## 2020-05-20 RX ADMIN — WARFARIN SODIUM 10 MG: 5 TABLET ORAL at 16:52

## 2020-05-20 RX ADMIN — LEVOTHYROXINE SODIUM 100 MCG: 0.1 TABLET ORAL at 06:23

## 2020-05-20 RX ADMIN — HEPARIN SODIUM 11 UNITS/KG/HR: 10000 INJECTION, SOLUTION INTRAVENOUS at 06:55

## 2020-05-20 RX ADMIN — LOSARTAN POTASSIUM 12.5 MG: 25 TABLET, FILM COATED ORAL at 10:31

## 2020-05-20 RX ADMIN — Medication 10 ML: at 10:32

## 2020-05-20 RX ADMIN — DIGOXIN 125 MCG: 125 TABLET ORAL at 10:30

## 2020-05-20 RX ADMIN — HEPARIN SODIUM 1990 UNITS: 1000 INJECTION, SOLUTION INTRAVENOUS; SUBCUTANEOUS at 10:34

## 2020-05-20 RX ADMIN — ENOXAPARIN SODIUM 70 MG: 80 INJECTION SUBCUTANEOUS at 15:47

## 2020-05-20 RX ADMIN — METOPROLOL SUCCINATE 50 MG: 50 TABLET, EXTENDED RELEASE ORAL at 10:31

## 2020-05-20 RX ADMIN — BUSPIRONE HYDROCHLORIDE 5 MG: 5 TABLET ORAL at 10:30

## 2020-05-20 ASSESSMENT — PAIN SCALES - GENERAL
PAINLEVEL_OUTOF10: 0

## 2020-05-20 NOTE — PROGRESS NOTES
Cardiac Electrophysiology Consultation Note    Boom Krishnamurthy  1950  Date of Service: 5/20/2020  PCP: Louis Smith DO      Reason for Consultation: Atrial Fibrillation    SUBJECTIVE: Boom Krishnamurthy is a 70 yo female who I was asked to see in Cardiac Electrophysiology consultation for Afib. She is not known to [facility] or Cardiology. Notes in the system show that she has seen PCP for years and has only been maintained on wafarin and Atenolol 25mg bid. Her past history os significant for hypertension, hypothyroidism, aortic and mitral valve replacements back in 2000 and 2002 (done separately, both mechanical valves per the patient), atrial fibrillation/flutter on chronic Coumadin therapy, anxiety, cardiomyopathy ( TTE LVEF 35% 1/25/2012, LVEF 25% on 5/14/20). In terms of Atrial flutter - EKG from 9/11/18 shows Atrial flutter with CVR    She presented to [facility]/14/20 with worsening dyspnea, COVID was ruled out. She also had fatigue. She was seen by cardiology Dr Mack Schofield and given per notes (500 mcg IV x 1 dose, followed by 250 mcg IV x 2 doses every four hours. Start Toprol-XL 50 mg twice daily). She had 2.9 sec pause on monitor on 5/14/20 pm and 6.8 second pause. B-blockers were held and transfer to [facility] was recommended for ICD evaluation. She was also in decompensated CHF with INR 7.4 on presentation. She has since been undergoing diuresis. HR has been uncontrolled up to 160's in atrial flutter and she is currently on a cardizem gtt. 5/19/20 : she feels well, just tired.  No chest pain or sob    5/20/20 : She feels better, wants to go home    Patient Active Problem List    Diagnosis Date Noted    Acute on chronic systolic congestive heart failure ([address] 05/18/2020    Cardiomyopathy ([facility] 05/18/2020    Sick sinus syndrome ([facility] 05/17/2020    NSTEMI (non-ST elevated myocardial infarction) ([address] 05/14/2020    Atrial flutter with rapid ventricular in the last 72 hours. TSH:   Lab Results   Component Value Date    TSH 3.470 05/14/2020      Cardiac Injury Profile: No results for input(s): CKTOTAL, CKMB, CKMBINDEX, TROPONINI in the last 72 hours. Lipid Profile:   Lab Results   Component Value Date    TRIG 71 05/15/2020    HDL 46 05/15/2020    LDLCALC 80 05/15/2020    CHOL 140 05/15/2020      Hemoglobin A1C: No components found for: HGBA1C   Xray:   NM Cardiac Stress Test Nuclear Imaging   Final Result   1. No reversible perfusion defect   2. Ejection fraction is 40 %. 3. No significant wall motion abnormality         XR CHEST STANDARD (2 VW)   Final Result   Cardiomegaly with particularly left atrial enlargement                     Pertinent Cardiac Testing:  TTE 5/14/20   Summary   Ejection fraction is visually estimated at 25%. Overall ejection fraction severely decreased. Normal right ventricle structure and function. Left atrial volume index of 118 ml per meters squared BSA. Mechanical prosthetic valve in aortic position. The aortic valve a maximum gradient of 15 mmHg and a mean gradient of 9   mmHg. Physiologic and/or trace aortic regurgitation is noted. Mechanical prosthetic valve is present. Mean transmitral gradient 6.2 mmHg. Physiologic and/or trace mitral regurgitation is present. Moderate and severe tricuspid regurgitation. Pulmonary hypertension is moderate. RVSP is 56 mmHg. Telemetry5/20/2020: Uncontrolled atrial flutter    ECG 5/15/20 : Atrial flutter with variable block, QRS 88ms, QTC 471ms    Chest Xray 5/18/20       FINDINGS:     Heart is mildly enlarged particularly the left atrium. There is   sternotomy and valvular replacement. There are no acute infiltrates or   effusions. Stress Test 5/17/20      FINDINGS:   Perfusion images demonstrate no reversible perfusion defect. Wall motion is within normal limits. The end diastolic volume is 5:27 ml. The end systolic volume is 67 ml.    The estimated ejection fraction is 48 %.         Impression   1. No reversible perfusion defect   2. Ejection fraction is 40 %. 3. No significant wall motion abnormality               I have independently reviewed all of the ECGs and rhythm strips per above. I have personally reviewed the laboratory, cardiac diagnostic and radiographic testing as outlined above. I have reviewed previous records noted in [address]. Impression:    1. Atrial Flutter  Unclear duration. EKG from 2018 showed Atrial flutter. She probably has atypical flutter. She had up to 6 second pauses probably after IV Digoxin loading on presentation. She has been in uncontrolled rapid atrial flutter and is on Cardizem gtt. -  FRANTZ/ Cardioversion to see if we can achieve sinus rhythm failed. She has likely been in Afib for a long time. Will continue rate control strategy. - Recommend avoiding Cardizem gtt and titrate up Toprol orally as her BP can tolerate  - No evidence of significant bradycardia thus no indication for pacemaker or ICD at this time.  - HR 60's overnight and in  AF. Continue digoxin 125 mcg daily and Toprol XL 50mg bid. BP soft in 90 systolics. 2. Cardiomyopathy  -- She does have Chronic cardiomyopathy dating back to at least 2012 when LVEF was 35%. -She will need ischemic evaluation to rule out CAD - stress test is planned today  - She has not been on GDMT- she was only on low dose Atenolol 25 mg at baseline over the last few years  - She will need initiation with ARB/ACE-I, maybe entresto/aldactone for her chronic systolic CHF as an outpatient  -LVEF 40-48% on SPECT    3. Prosthetic Heart Valve  Aortic and mitral valve replacements back in 2000 and 2002  Maintained on Coumadin  Normal function on TTE of valves  -Ok to restart coumadin    4. Acute on Chronic CHF  Appears Euvolemic. Lasix on hold  Losartan initiated and titrated as an outpatient.  Best to maximize B-blockers first for control of her AF    Thank you for allowing me to participate in your patient's care. [facility] Cardiac Electrophysiology  [address] [facility]    NOTE: This report was transcribed using voice recognition software. Every effort was made to ensure accuracy; however, inadvertent computerized transcription errors may be present.

## 2020-05-20 NOTE — PROGRESS NOTES
Messaged Cardiology regarding aldactone or losartan and adding outpatient. Also, regarding heparin running and awaiting orders.

## 2020-05-20 NOTE — PROGRESS NOTES
congestive heart failure ([facility]    Cardiomyopathy ([facility]  Resolved Problems:    * No resolved hospital problems. *      Plan:    48-year-old female history of aortic and mitral valve replacement admitted to [facility] with a flutter. Found to have non STEMI and acute on chronic systolic CHF transferred to [address] [place] [address] for further evaluation and treatment.   Status post FRANTZ/unsuccessful cardioversion 5/18    Heparin drip transition to oral AC  Diltiazem drip completed  Digoxin   Toprol  Losartan  Aldactone  IV Lasix completed  Nuclear stress no reversible perfusion defect EF 40%  Cardiology Consult appreciated   EP Consult appreciated   PT/OT  DVT PPx  DC planning DC home        Electronically signed by Merry Lozano MD on 5/20/2020 at 8:17 AM

## 2020-05-20 NOTE — DISCHARGE SUMMARY
Physician Discharge Summary     Patient ID:  Silvino Ballard  [id number]  69 y.o.  1950    Admit date: 5/17/2020    Discharge date and time:  5/20/2020    Discharge Diagnoses: Principal Problem:    Atrial flutter with rapid ventricular response ([facility]  Active Problems:    Acquired hypothyroidism    H/O aortic valve replacement    H/O mitral valve replacement    NSTEMI (non-ST elevated myocardial infarction) ([address]    Acute on chronic systolic congestive heart failure ([address]    Cardiomyopathy ([address]  Resolved Problems:    * No resolved hospital problems. *      Consults: IP CONSULT TO CARDIOLOGY  IP CONSULT TO ELECTROPHYSIOLOGY    Procedures: below    Hospital Course: 39-year-old female history of aortic and mitral valve replacement admitted to [facility] with a flutter. Found to have non STEMI and acute on chronic systolic CHF transferred to [address] [place] [address] for further evaluation and treatment.   Status post FRANTZ/unsuccessful cardioversion 5/18     Heparin drip transition to oral AC-- lovenox bridge to coumadin  Diltiazem drip completed  Digoxin   Toprol  Losartan  Aldactone  IV Lasix completed  Nuclear stress no reversible perfusion defect EF 40%  Cardiology Consult appreciated   EP Consult appreciated   PT/OT  DVT PPx  DC planning DC home    Discharge Exam:  See progress note from today    Condition:  Stable    Disposition: home    Patient Instructions:   Current Discharge Medication List      START taking these medications    Details   digoxin (LANOXIN) 125 MCG tablet Take 1 tablet by mouth daily  Qty: 30 tablet, Refills: 3      warfarin (COUMADIN) 10 MG tablet Take 1 tablet by mouth daily  Qty: 30 tablet, Refills: 1      enoxaparin (LOVENOX) 80 MG/0.8ML injection Inject 0.7 mLs into the skin every 12 hours for 14 days  Qty: 19.6 mL, Refills: 0         CONTINUE these medications which have CHANGED    Details   losartan (COZAAR) 25 MG tablet Take 0.5 tablets by mouth daily  Qty: 30 tablet, Refills: 3 metoprolol succinate (TOPROL XL) 50 MG extended release tablet Take 1 tablet by mouth 2 times daily  Qty: 30 tablet, Refills: 3         CONTINUE these medications which have NOT CHANGED    Details   acetaminophen (TYLENOL) 325 MG tablet Take 2 tablets by mouth every 6 hours as needed for Pain or Fever  Qty: 120 tablet, Refills: 3      busPIRone (BUSPAR) 5 MG tablet Take 1 tablet by mouth 2 times daily      polyethylene glycol (GLYCOLAX) 17 g packet Take 17 g by mouth daily as needed for Constipation  Qty: 527 g, Refills: 1      levothyroxine (SYNTHROID) 100 MCG tablet Take 1 tablet by mouth Daily  Qty: 30 tablet, Refills: 3         STOP taking these medications       Heparin Sodium, Porcine, (HEPARIN, PORCINE,) 1000 UNIT/ML injection Comments:   Reason for Stopping:         Heparin Sodium, Porcine, (HEPARIN, PORCINE,) 1000 UNIT/ML injection Comments:   Reason for Stopping:         Heparin Sod, Porcine, in D5W (HEPARIN, PORCINE,) 100 UNIT/ML SOLN infusion Comments:   Reason for Stopping:         trimethobenzamide (TIGAN) 100 MG/ML injection Comments:   Reason for Stopping:         furosemide (LASIX) 10 MG/ML injection Comments:   Reason for Stopping:         spironolactone (ALDACTONE) 25 MG tablet Comments:   Reason for Stopping:             Activity: activity as tolerated  Diet: regular diet    Follow-up with 1wk PCP, 2wk cardiology/EP    Note that over 30 minutes was spent in preparing discharge papers, discussing discharge with patient, staff, consultants, medication review, arranging follow up, etc.    Signed:  Danika Portillo MD  5/20/2020

## 2020-05-21 ENCOUNTER — CARE COORDINATION (OUTPATIENT)
Dept: CASE MANAGEMENT | Age: 70
End: 2020-05-21

## 2020-05-21 LAB
EKG ATRIAL RATE: 271 BPM
EKG Q-T INTERVAL: 350 MS
EKG QRS DURATION: 88 MS
EKG QTC CALCULATION (BAZETT): 471 MS
EKG R AXIS: 99 DEGREES
EKG T AXIS: -153 DEGREES
EKG VENTRICULAR RATE: 109 BPM

## 2020-05-22 ENCOUNTER — ANTI-COAG VISIT (OUTPATIENT)
Dept: FAMILY MEDICINE CLINIC | Age: 70
End: 2020-05-22

## 2020-05-22 ENCOUNTER — TELEPHONE (OUTPATIENT)
Dept: CARDIOLOGY CLINIC | Age: 70
End: 2020-05-22

## 2020-05-26 ENCOUNTER — TELEPHONE (OUTPATIENT)
Dept: CARDIOLOGY CLINIC | Age: 70
End: 2020-05-26

## 2020-05-26 ENCOUNTER — ANTI-COAG VISIT (OUTPATIENT)
Dept: CARDIOLOGY CLINIC | Age: 70
End: 2020-05-26

## 2020-05-26 ENCOUNTER — OFFICE VISIT (OUTPATIENT)
Dept: FAMILY MEDICINE CLINIC | Age: 70
End: 2020-05-26
Payer: MEDICARE

## 2020-05-26 ENCOUNTER — TELEPHONE (OUTPATIENT)
Dept: FAMILY MEDICINE CLINIC | Age: 70
End: 2020-05-26

## 2020-05-26 VITALS
SYSTOLIC BLOOD PRESSURE: 120 MMHG | OXYGEN SATURATION: 99 % | WEIGHT: 155 LBS | HEIGHT: 65 IN | BODY MASS INDEX: 25.83 KG/M2 | TEMPERATURE: 98 F | HEART RATE: 64 BPM | DIASTOLIC BLOOD PRESSURE: 76 MMHG

## 2020-05-26 LAB — INR BLD: 1

## 2020-05-26 PROCEDURE — 99495 TRANSJ CARE MGMT MOD F2F 14D: CPT | Performed by: FAMILY MEDICINE

## 2020-05-26 PROCEDURE — 1111F DSCHRG MED/CURRENT MED MERGE: CPT | Performed by: FAMILY MEDICINE

## 2020-05-26 ASSESSMENT — ENCOUNTER SYMPTOMS
BLOOD IN STOOL: 0
DIARRHEA: 0
CONSTIPATION: 0
WHEEZING: 0

## 2020-05-26 NOTE — TELEPHONE ENCOUNTER
[facility] called to check when Ashley Harden should have her next INR. I did tell him that the most recent note from 5/22/2020 said that Cardiology is following her coumadin at this time. He said he'll contact cardiology.

## 2020-05-26 NOTE — TELEPHONE ENCOUNTER
According to nurse Renetta Farnsworth patient's INR today is 1.0 and PTT 12.5, it was 1.1 and 13.8 on 5/22. Renetta Farnsworth says pt. Skipped a dose on Friday. She is taking 10mg daily as of 5/21, she was taking 5mg on Saturday and 10mg all others.

## 2020-05-26 NOTE — TELEPHONE ENCOUNTER
I spoke with pt. She said a home health nurse is coming to her house today and she will check her INR and call the office back.  Pt. Is currently taking 5mg on Saturday and 10mg the rest of the week

## 2020-05-28 ENCOUNTER — OFFICE VISIT (OUTPATIENT)
Dept: CARDIOLOGY CLINIC | Age: 70
End: 2020-05-28
Payer: MEDICARE

## 2020-05-28 ENCOUNTER — CARE COORDINATION (OUTPATIENT)
Dept: CASE MANAGEMENT | Age: 70
End: 2020-05-28

## 2020-05-28 VITALS
BODY MASS INDEX: 24.75 KG/M2 | HEART RATE: 52 BPM | OXYGEN SATURATION: 98 % | RESPIRATION RATE: 20 BRPM | HEIGHT: 66 IN | SYSTOLIC BLOOD PRESSURE: 136 MMHG | DIASTOLIC BLOOD PRESSURE: 70 MMHG | WEIGHT: 154 LBS

## 2020-05-28 PROCEDURE — G8400 PT W/DXA NO RESULTS DOC: HCPCS | Performed by: NURSE PRACTITIONER

## 2020-05-28 PROCEDURE — 4040F PNEUMOC VAC/ADMIN/RCVD: CPT | Performed by: NURSE PRACTITIONER

## 2020-05-28 PROCEDURE — 93000 ELECTROCARDIOGRAM COMPLETE: CPT | Performed by: INTERNAL MEDICINE

## 2020-05-28 PROCEDURE — 1111F DSCHRG MED/CURRENT MED MERGE: CPT | Performed by: NURSE PRACTITIONER

## 2020-05-28 PROCEDURE — 99214 OFFICE O/P EST MOD 30 MIN: CPT | Performed by: NURSE PRACTITIONER

## 2020-05-28 PROCEDURE — 1036F TOBACCO NON-USER: CPT | Performed by: NURSE PRACTITIONER

## 2020-05-28 PROCEDURE — 1090F PRES/ABSN URINE INCON ASSESS: CPT | Performed by: NURSE PRACTITIONER

## 2020-05-28 PROCEDURE — 1123F ACP DISCUSS/DSCN MKR DOCD: CPT | Performed by: NURSE PRACTITIONER

## 2020-05-28 PROCEDURE — G8427 DOCREV CUR MEDS BY ELIG CLIN: HCPCS | Performed by: NURSE PRACTITIONER

## 2020-05-28 PROCEDURE — 3017F COLORECTAL CA SCREEN DOC REV: CPT | Performed by: NURSE PRACTITIONER

## 2020-05-28 PROCEDURE — G8417 CALC BMI ABV UP PARAM F/U: HCPCS | Performed by: NURSE PRACTITIONER

## 2020-05-28 RX ORDER — BUMETANIDE 1 MG/1
1 TABLET ORAL DAILY PRN
Qty: 30 TABLET | Refills: 3 | Status: SHIPPED
Start: 2020-05-28 | End: 2020-10-12

## 2020-05-28 RX ORDER — SACUBITRIL AND VALSARTAN 24; 26 MG/1; MG/1
1 TABLET, FILM COATED ORAL 2 TIMES DAILY
Qty: 60 TABLET | Refills: 3 | Status: SHIPPED
Start: 2020-05-28 | End: 2020-06-05 | Stop reason: DRUGHIGH

## 2020-05-28 NOTE — PROGRESS NOTES
Diagnosis Date Noted    Acute on chronic systolic congestive heart failure ([facility] 05/18/2020    Cardiomyopathy ([facility] 05/18/2020    Sick sinus syndrome ([facility] 05/17/2020    NSTEMI (non-ST elevated myocardial infarction) ([facility] 05/14/2020    Atrial flutter with rapid ventricular response ([facility] 05/14/2020    Macular hole of right eye 03/07/2017    Acquired hypothyroidism 03/07/2017    H/O aortic valve replacement 03/07/2017    H/O mitral valve replacement 03/07/2017     PAST MEDICAL HISTORY:    1. Hypertension. 2. Hypothyroidism, on replacement therapy. 3. History of aortic and mitral valve replacements at [facility] in 2000 and 2002. 4. Atrial flutter/fibrillation on chronic Coumadin therapy. 5. History of cardiomyopathy. ? 2D TTE from 2012 by Dr. Tirso Yuen: Summary: Left ventricular size is grossly normal. Normal left ventricular wall thickness. Ejection fraction is visually estimated at 35%. No evidence of left ventricular mass or thrombus noted. No regional wall motion abnormalities seen. Overall ejection fraction moderate-to-severely decreased. The left atrium is moderately dilated. Interatrial septum appears intact. No evidence of thrombus within left atrium. Borderline dilated right ventricle. No evidence of a thrombus in the right ventricle. Mildly enlarged right atrium size. No evidence of thrombus or mass in the right atrium. Mitral valve replacement (mechanical). Normal pressure gradient. Mechanical aortic valve replacement. Normal pressure Mild tricuspid regurgitation. The tricuspid valve appears structurally normal. RVSP is 30.8 mmHg. Pulmonary hypertension is mild. The pulmonic valve was not well visualized. Physiologic and/or trace pulmonic regurgitation present. 6. Tobacco abuse. 7. Rheumatic fever as a child. 8. Medical non-compliance. 9. Anxiety.        Past Medical History:   Diagnosis Date    H/O mitral valve replacement     Hypertension     Hypothyroidism     S/P aortic valve replacement        Past Surgical History:   Procedure Laterality Date    CARDIAC VALVE REPLACEMENT      twice-1. MV 20 years ago 2. AV 1.5 years after (05/28/20)    CARDIOVERSION  05/18/2020    ECHO COMPL W DOP COLOR FLOW  1/25/2012         TRANSESOPHAGEAL ECHOCARDIOGRAM  05/18/2020       No Known Allergies      Outpatient Medications Marked as Taking for the 5/28/20 encounter (Office Visit) with GLADYS Puri - CNP   Medication Sig Dispense Refill    losartan (COZAAR) 25 MG tablet Take 0.5 tablets by mouth daily 30 tablet 3    metoprolol succinate (TOPROL XL) 50 MG extended release tablet Take 1 tablet by mouth 2 times daily 30 tablet 3    digoxin (LANOXIN) 125 MCG tablet Take 1 tablet by mouth daily 30 tablet 3    warfarin (COUMADIN) 10 MG tablet Take 1 tablet by mouth daily 30 tablet 1    enoxaparin (LOVENOX) 80 MG/0.8ML injection Inject 0.7 mLs into the skin every 12 hours for 14 days 19.6 mL 0    acetaminophen (TYLENOL) 325 MG tablet Take 2 tablets by mouth every 6 hours as needed for Pain or Fever 120 tablet 3    busPIRone (BUSPAR) 5 MG tablet Take 1 tablet by mouth 2 times daily      polyethylene glycol (GLYCOLAX) 17 g packet Take 17 g by mouth daily as needed for Constipation 527 g 1    levothyroxine (SYNTHROID) 100 MCG tablet Take 1 tablet by mouth Daily 30 tablet 3       Guideline directed medical/device therapy:  ARNI/ACE I/ARB: Yes  Beta blocker:   Yes  Aldosterone antagonist:  Yes  ICD/CRT-P/-D:  none   QRS interval on recent ECG (personally reviewed/interpreted): <120 ms  Percentage RV pacing (personally reviewed/interpreted): %/NA      Review of Systems:   Cardiac: As per HPI  General: No fever, chills, rigors  Pulmonary: As per HPI  HEENT: No visual disturbances, difficult swallowing  GI: No nausea, vomiting, abdominal pain  : No dysuria or hematuria  Endocrine: No thyroid disease or diabetes  Musculoskeletal: JONES x 4, no focal motor deficits  Skin: Intact, no atrial fibrillation/flutter s/p unsuccessful DCCV x 2 - follows with EP   8. VHD s/p mechanical AVR and MVR - normal function per TTE (5/2020)  9. Rheumatic fever as child   10. Chronic OAC with Coumadin   11. Lexiscan MPS 05/19/2020: Nonischemic, no WMA  12. Hypertension  13. Hypothyroidism - on HRT  14. Tobacco use  15. Anxiety                 PLAN:  1. Stop Losartan     2. Start Entresto 24/26 mg twice daily - prescription assistance in our office basement if needed. 3. Start Bumex 1 mg daily as needed, take first dose today then use as needed for weight gain, shortness of breath, swelling or abdominal bloating. 4. Get blood work in one week     5. PCP managing INR    6. Eventual referral to cardiac rehab ( currently closed due to COVID-19)    7. Referral sleep study     8. Return visit with Dr. Radha Barry in 1 month. 9. Weigh yourself daily    -Stay Hydrated    -Diet should sodium restricted to 2 grams    -Again watch your daily weight trends and if you gain water weight please follow below instructions.    -If you gain 3-5 pounds in 2-3 days OR notice that you are retaining fluid in anyway just like you did before then take an extra dose of your water pill (bumetanide/Bumex) every day until you lose the weight or feel better.     -If you notice that you have taken more than 3 extra doses in 1 week then please call and let us know. -If at any time you feel that you are retaining fluid, your medications are not working, or you feel ill in anyway, then please call us for either same day appointment or the next day, and for instructions. Our goal is to keep you out of the emergency room and the hospital and we have ways to do it. You just need to call us in a timely manner.     -If you become sick for other reasons, and notice that you are not urinating as much, the urine is very dark, you have significant diarrhea or vomiting, then please DO NOT take your water pill and CALL US immediately.       Lorelei Evans [address] [facility]

## 2020-05-29 ENCOUNTER — TELEPHONE (OUTPATIENT)
Dept: CARDIOLOGY CLINIC | Age: 70
End: 2020-05-29

## 2020-05-29 RX ORDER — METOPROLOL SUCCINATE 50 MG/1
25 TABLET, EXTENDED RELEASE ORAL 2 TIMES DAILY
Qty: 30 TABLET | Refills: 1 | Status: SHIPPED
Start: 2020-05-29 | End: 2020-07-23

## 2020-05-29 NOTE — TELEPHONE ENCOUNTER
NO - Atentolol was changed while in hospital to Toprol (50 mg twice daily) due to LV dysfunction and guideline directed medical therapy. She should NOT be taking both of these, only Toprol. Thank you.

## 2020-05-29 NOTE — TELEPHONE ENCOUNTER
Tram Peacock is Not taking atenolol. Self fills pill box. She has been taking metoprolol succinate 50mg tab twice daily. Is not taking the atenolol but had the bottle at home. 5- Took her metoprolol at 6am and saw Nitza Aranda  10:30am. Heart rate per EKG was 52. Reduce metoprolol succinate 25mg twice daily. Follow from recall list in a month with DR Catalina Francis. He will evaluate your heart rate and metroprol dosing at that visit. Round and scored she looked at her toprol tab. New script escribed 30 days. DR Catalina Francis will take over script after seeing her next month. She will monitor for any rapid/fast heart rates. Inc sob, diaphoresis, dizziness.      [phone]

## 2020-06-01 ENCOUNTER — HOSPITAL ENCOUNTER (OUTPATIENT)
Age: 70
Discharge: HOME | End: 2020-06-03
Payer: MEDICARE

## 2020-06-01 ENCOUNTER — ANTI-COAG VISIT (OUTPATIENT)
Dept: FAMILY MEDICINE CLINIC | Age: 70
End: 2020-06-01

## 2020-06-01 LAB
ANION GAP SERPL CALCULATED.3IONS-SCNC: 20 MMOL/L (ref 7–16)
BUN BLDV-MCNC: 26 MG/DL (ref 8–23)
CALCIUM SERPL-MCNC: 10.1 MG/DL (ref 8.6–10.2)
CHLORIDE BLD-SCNC: 102 MMOL/L (ref 98–107)
CO2: 22 MMOL/L (ref 22–29)
CREAT SERPL-MCNC: 0.9 MG/DL (ref 0.5–1)
FERRITIN: 39 NG/ML
GFR AFRICAN AMERICAN: >60
GFR NON-AFRICAN AMERICAN: >60 ML/MIN/1.73
GLUCOSE BLD-MCNC: 111 MG/DL (ref 74–99)
INR BLD: 1.4
IRON SATURATION: 16 % (ref 15–50)
IRON: 74 MCG/DL (ref 37–145)
POTASSIUM SERPL-SCNC: 5.4 MMOL/L (ref 3.5–5)
PRO-BNP: 537 PG/ML (ref 0–125)
SODIUM BLD-SCNC: 144 MMOL/L (ref 132–146)
TOTAL IRON BINDING CAPACITY: 466 MCG/DL (ref 250–450)
TRANSFERRIN: 378 MG/DL (ref 200–360)

## 2020-06-01 PROCEDURE — 80048 BASIC METABOLIC PNL TOTAL CA: CPT

## 2020-06-01 PROCEDURE — 83880 ASSAY OF NATRIURETIC PEPTIDE: CPT

## 2020-06-01 PROCEDURE — 83540 ASSAY OF IRON: CPT

## 2020-06-01 PROCEDURE — 83550 IRON BINDING TEST: CPT

## 2020-06-01 PROCEDURE — 84466 ASSAY OF TRANSFERRIN: CPT

## 2020-06-01 PROCEDURE — 82728 ASSAY OF FERRITIN: CPT

## 2020-06-01 PROCEDURE — 36415 COLL VENOUS BLD VENIPUNCTURE: CPT

## 2020-06-02 ENCOUNTER — TELEPHONE (OUTPATIENT)
Dept: CARDIOLOGY CLINIC | Age: 70
End: 2020-06-02

## 2020-06-02 ENCOUNTER — TELEPHONE (OUTPATIENT)
Dept: FAMILY MEDICINE CLINIC | Age: 70
End: 2020-06-02

## 2020-06-02 PROBLEM — D50.9 IRON DEFICIENCY ANEMIA: Status: ACTIVE | Noted: 2020-06-02

## 2020-06-02 RX ORDER — DIPHENHYDRAMINE HYDROCHLORIDE 50 MG/ML
50 INJECTION INTRAMUSCULAR; INTRAVENOUS ONCE
Status: CANCELLED | OUTPATIENT
Start: 2020-06-02

## 2020-06-02 RX ORDER — SODIUM CHLORIDE 9 MG/ML
INJECTION, SOLUTION INTRAVENOUS CONTINUOUS
Status: CANCELLED | OUTPATIENT
Start: 2020-06-02

## 2020-06-02 RX ORDER — SODIUM CHLORIDE 0.9 % (FLUSH) 0.9 %
10 SYRINGE (ML) INJECTION PRN
Status: CANCELLED | OUTPATIENT
Start: 2020-06-02

## 2020-06-02 RX ORDER — HEPARIN SODIUM (PORCINE) LOCK FLUSH IV SOLN 100 UNIT/ML 100 UNIT/ML
500 SOLUTION INTRAVENOUS PRN
Status: CANCELLED | OUTPATIENT
Start: 2020-06-02

## 2020-06-02 RX ORDER — SODIUM CHLORIDE 0.9 % (FLUSH) 0.9 %
5 SYRINGE (ML) INJECTION PRN
Status: CANCELLED | OUTPATIENT
Start: 2020-06-02

## 2020-06-02 RX ORDER — EPINEPHRINE 1 MG/ML
0.3 INJECTION, SOLUTION, CONCENTRATE INTRAVENOUS PRN
Status: CANCELLED | OUTPATIENT
Start: 2020-06-02

## 2020-06-02 RX ORDER — METHYLPREDNISOLONE SODIUM SUCCINATE 125 MG/2ML
125 INJECTION, POWDER, LYOPHILIZED, FOR SOLUTION INTRAMUSCULAR; INTRAVENOUS ONCE
Status: CANCELLED | OUTPATIENT
Start: 2020-06-02

## 2020-06-04 ENCOUNTER — HOSPITAL ENCOUNTER (OUTPATIENT)
Age: 70
Discharge: HOME | End: 2020-06-06
Payer: MEDICARE

## 2020-06-04 LAB
ANION GAP SERPL CALCULATED.3IONS-SCNC: 15 MMOL/L (ref 7–16)
BUN BLDV-MCNC: 34 MG/DL (ref 8–23)
CALCIUM SERPL-MCNC: 9.3 MG/DL (ref 8.6–10.2)
CHLORIDE BLD-SCNC: 103 MMOL/L (ref 98–107)
CO2: 22 MMOL/L (ref 22–29)
CREAT SERPL-MCNC: 0.9 MG/DL (ref 0.5–1)
GFR AFRICAN AMERICAN: >60
GFR NON-AFRICAN AMERICAN: >60 ML/MIN/1.73
GLUCOSE BLD-MCNC: 105 MG/DL (ref 74–99)
POTASSIUM SERPL-SCNC: 4.5 MMOL/L (ref 3.5–5)
PRO-BNP: 680 PG/ML (ref 0–125)
SODIUM BLD-SCNC: 140 MMOL/L (ref 132–146)

## 2020-06-04 PROCEDURE — 80048 BASIC METABOLIC PNL TOTAL CA: CPT

## 2020-06-04 PROCEDURE — 36415 COLL VENOUS BLD VENIPUNCTURE: CPT

## 2020-06-04 PROCEDURE — 83880 ASSAY OF NATRIURETIC PEPTIDE: CPT

## 2020-06-05 ENCOUNTER — HOSPITAL ENCOUNTER (OUTPATIENT)
Dept: INFUSION THERAPY | Age: 70
Setting detail: INFUSION SERIES
Discharge: HOME | End: 2020-06-05
Payer: MEDICARE

## 2020-06-05 ENCOUNTER — TELEPHONE (OUTPATIENT)
Dept: CARDIOLOGY CLINIC | Age: 70
End: 2020-06-05

## 2020-06-05 VITALS
RESPIRATION RATE: 18 BRPM | HEART RATE: 73 BPM | OXYGEN SATURATION: 100 % | DIASTOLIC BLOOD PRESSURE: 63 MMHG | TEMPERATURE: 97.6 F | SYSTOLIC BLOOD PRESSURE: 108 MMHG

## 2020-06-05 DIAGNOSIS — D50.9 IRON DEFICIENCY ANEMIA, UNSPECIFIED IRON DEFICIENCY ANEMIA TYPE: Primary | ICD-10-CM

## 2020-06-05 PROCEDURE — 2580000003 HC RX 258

## 2020-06-05 PROCEDURE — 96365 THER/PROPH/DIAG IV INF INIT: CPT

## 2020-06-05 PROCEDURE — 6360000002 HC RX W HCPCS

## 2020-06-05 RX ORDER — SODIUM CHLORIDE 0.9 % (FLUSH) 0.9 %
SYRINGE (ML) INJECTION
Status: COMPLETED
Start: 2020-06-05 | End: 2020-06-05

## 2020-06-05 RX ORDER — SACUBITRIL AND VALSARTAN 49; 51 MG/1; MG/1
1 TABLET, FILM COATED ORAL 2 TIMES DAILY
Qty: 60 TABLET | Refills: 11 | Status: SHIPPED
Start: 2020-06-05 | End: 2020-06-16

## 2020-06-05 RX ORDER — SODIUM CHLORIDE 9 MG/ML
INJECTION, SOLUTION INTRAVENOUS CONTINUOUS
Status: CANCELLED | OUTPATIENT
Start: 2020-06-12

## 2020-06-05 RX ORDER — DIPHENHYDRAMINE HYDROCHLORIDE 50 MG/ML
50 INJECTION INTRAMUSCULAR; INTRAVENOUS ONCE
Status: CANCELLED | OUTPATIENT
Start: 2020-06-12

## 2020-06-05 RX ORDER — SODIUM CHLORIDE 0.9 % (FLUSH) 0.9 %
5 SYRINGE (ML) INJECTION PRN
Status: DISCONTINUED | OUTPATIENT
Start: 2020-06-05 | End: 2020-06-06 | Stop reason: HOSPADM

## 2020-06-05 RX ORDER — METHYLPREDNISOLONE SODIUM SUCCINATE 125 MG/2ML
125 INJECTION, POWDER, LYOPHILIZED, FOR SOLUTION INTRAMUSCULAR; INTRAVENOUS ONCE
Status: CANCELLED | OUTPATIENT
Start: 2020-06-12

## 2020-06-05 RX ORDER — SODIUM CHLORIDE 0.9 % (FLUSH) 0.9 %
5 SYRINGE (ML) INJECTION PRN
Status: CANCELLED | OUTPATIENT
Start: 2020-06-12

## 2020-06-05 RX ORDER — HEPARIN SODIUM (PORCINE) LOCK FLUSH IV SOLN 100 UNIT/ML 100 UNIT/ML
500 SOLUTION INTRAVENOUS PRN
Status: CANCELLED | OUTPATIENT
Start: 2020-06-12

## 2020-06-05 RX ORDER — SODIUM CHLORIDE 0.9 % (FLUSH) 0.9 %
10 SYRINGE (ML) INJECTION PRN
Status: CANCELLED | OUTPATIENT
Start: 2020-06-12

## 2020-06-05 RX ORDER — EPINEPHRINE 1 MG/ML
0.3 INJECTION, SOLUTION, CONCENTRATE INTRAVENOUS PRN
Status: CANCELLED | OUTPATIENT
Start: 2020-06-12

## 2020-06-05 RX ORDER — SODIUM CHLORIDE 9 MG/ML
INJECTION, SOLUTION INTRAVENOUS CONTINUOUS
Status: DISCONTINUED | OUTPATIENT
Start: 2020-06-05 | End: 2020-06-06 | Stop reason: HOSPADM

## 2020-06-05 RX ORDER — SODIUM CHLORIDE 0.9 % (FLUSH) 0.9 %
10 SYRINGE (ML) INJECTION PRN
Status: DISCONTINUED | OUTPATIENT
Start: 2020-06-05 | End: 2020-06-06 | Stop reason: HOSPADM

## 2020-06-05 RX ADMIN — Medication 5 ML: at 13:15

## 2020-06-05 RX ADMIN — SODIUM CHLORIDE, PRESERVATIVE FREE 5 ML: 5 INJECTION INTRAVENOUS at 13:15

## 2020-06-05 ASSESSMENT — PAIN SCALES - GENERAL: PAINLEVEL_OUTOF10: 0

## 2020-06-05 NOTE — TELEPHONE ENCOUNTER
Called Socrates Torres with Marivel Agustin CNP instructions 6/5/2020 1:47 PM   [phone] (home)  left detailed message. Pending call back from Socrates Torres to confirm she is feeling well. Mailed letter also.      Gilberto Dsouza RN

## 2020-06-08 ENCOUNTER — ANTI-COAG VISIT (OUTPATIENT)
Dept: FAMILY MEDICINE CLINIC | Age: 70
End: 2020-06-08

## 2020-06-08 LAB — INR BLD: 1.8

## 2020-06-08 NOTE — TELEPHONE ENCOUNTER
Recalled Alysa Zurita [phone] (home)  6/8/2020 9:25 AM     Feels wonderful and great. No weight gain, swelling or sob. Looser stool in am x 2 days then formed and normal. . Eating more fruit at night (oranges nightly and apples)  Will reduce high K+ fruits and choose the low K+ fruit choices. Keeping hydrated. Nhung Luis was increased to 49-51mg  6 5 2020.  (she received the message and increased dosing that night. Taking 2 of the 24-26mg tabs twice daily. She cannot afford entresto and was given samples in the office. Did not go to [facility] Rx assistance yet. She can't use the trial card ( she has [email or web address] not commercial). Provided rx assistance [phone] and transferred Alysa Zurita to them to start forms. 1950 69 y.o.    [address]   [phone] (home)

## 2020-06-10 ENCOUNTER — HOSPITAL ENCOUNTER (OUTPATIENT)
Dept: SLEEP CENTER | Age: 70
Discharge: HOME | End: 2020-06-10
Payer: MEDICARE

## 2020-06-10 VITALS
HEART RATE: 40 BPM | OXYGEN SATURATION: 97 % | TEMPERATURE: 96.8 F | WEIGHT: 150 LBS | HEIGHT: 65 IN | BODY MASS INDEX: 24.99 KG/M2 | DIASTOLIC BLOOD PRESSURE: 73 MMHG | SYSTOLIC BLOOD PRESSURE: 118 MMHG

## 2020-06-10 PROCEDURE — 95810 POLYSOM 6/> YRS 4/> PARAM: CPT

## 2020-06-10 ASSESSMENT — SLEEP AND FATIGUE QUESTIONNAIRES
HOW LIKELY ARE YOU TO NOD OFF OR FALL ASLEEP WHILE WATCHING TV: 1
HOW LIKELY ARE YOU TO NOD OFF OR FALL ASLEEP WHILE SITTING QUIETLY AFTER LUNCH WITHOUT ALCOHOL: 0
ESS TOTAL SCORE: 4
HOW LIKELY ARE YOU TO NOD OFF OR FALL ASLEEP WHILE SITTING AND READING: 0
HOW LIKELY ARE YOU TO NOD OFF OR FALL ASLEEP IN A CAR, WHILE STOPPED FOR A FEW MINUTES IN TRAFFIC: 0
HOW LIKELY ARE YOU TO NOD OFF OR FALL ASLEEP WHEN YOU ARE A PASSENGER IN A CAR FOR AN HOUR WITHOUT A BREAK: 1
HOW LIKELY ARE YOU TO NOD OFF OR FALL ASLEEP WHILE LYING DOWN TO REST IN THE AFTERNOON WHEN CIRCUMSTANCES PERMIT: 2
HOW LIKELY ARE YOU TO NOD OFF OR FALL ASLEEP WHILE SITTING INACTIVE IN A PUBLIC PLACE: 0
HOW LIKELY ARE YOU TO NOD OFF OR FALL ASLEEP WHILE SITTING AND TALKING TO SOMEONE: 0

## 2020-06-11 ENCOUNTER — HOSPITAL ENCOUNTER (OUTPATIENT)
Dept: INFUSION THERAPY | Age: 70
Setting detail: INFUSION SERIES
Discharge: HOME | End: 2020-06-11
Payer: MEDICARE

## 2020-06-11 VITALS
DIASTOLIC BLOOD PRESSURE: 45 MMHG | TEMPERATURE: 98.4 F | RESPIRATION RATE: 18 BRPM | HEART RATE: 64 BPM | SYSTOLIC BLOOD PRESSURE: 101 MMHG

## 2020-06-11 DIAGNOSIS — D50.9 IRON DEFICIENCY ANEMIA, UNSPECIFIED IRON DEFICIENCY ANEMIA TYPE: Primary | ICD-10-CM

## 2020-06-11 PROCEDURE — 2580000003 HC RX 258: Performed by: NURSE PRACTITIONER

## 2020-06-11 PROCEDURE — 2580000003 HC RX 258

## 2020-06-11 PROCEDURE — 96365 THER/PROPH/DIAG IV INF INIT: CPT

## 2020-06-11 PROCEDURE — 6360000002 HC RX W HCPCS: Performed by: NURSE PRACTITIONER

## 2020-06-11 RX ORDER — SODIUM CHLORIDE 0.9 % (FLUSH) 0.9 %
5 SYRINGE (ML) INJECTION PRN
Status: CANCELLED | OUTPATIENT
Start: 2020-06-11

## 2020-06-11 RX ORDER — METHYLPREDNISOLONE SODIUM SUCCINATE 125 MG/2ML
125 INJECTION, POWDER, LYOPHILIZED, FOR SOLUTION INTRAMUSCULAR; INTRAVENOUS ONCE
Status: CANCELLED | OUTPATIENT
Start: 2020-06-11

## 2020-06-11 RX ORDER — SODIUM CHLORIDE 9 MG/ML
INJECTION, SOLUTION INTRAVENOUS CONTINUOUS
Status: CANCELLED | OUTPATIENT
Start: 2020-06-11

## 2020-06-11 RX ORDER — SODIUM CHLORIDE 9 MG/ML
INJECTION, SOLUTION INTRAVENOUS CONTINUOUS
Status: ACTIVE | OUTPATIENT
Start: 2020-06-11 | End: 2020-06-11

## 2020-06-11 RX ORDER — SODIUM CHLORIDE 0.9 % (FLUSH) 0.9 %
SYRINGE (ML) INJECTION
Status: COMPLETED
Start: 2020-06-11 | End: 2020-06-11

## 2020-06-11 RX ORDER — SODIUM CHLORIDE 0.9 % (FLUSH) 0.9 %
10 SYRINGE (ML) INJECTION PRN
Status: CANCELLED | OUTPATIENT
Start: 2020-06-11

## 2020-06-11 RX ORDER — DIPHENHYDRAMINE HYDROCHLORIDE 50 MG/ML
50 INJECTION INTRAMUSCULAR; INTRAVENOUS ONCE
Status: CANCELLED | OUTPATIENT
Start: 2020-06-11

## 2020-06-11 RX ORDER — HEPARIN SODIUM (PORCINE) LOCK FLUSH IV SOLN 100 UNIT/ML 100 UNIT/ML
500 SOLUTION INTRAVENOUS PRN
Status: CANCELLED | OUTPATIENT
Start: 2020-06-11

## 2020-06-11 RX ORDER — EPINEPHRINE 1 MG/ML
0.3 INJECTION, SOLUTION, CONCENTRATE INTRAVENOUS PRN
Status: CANCELLED | OUTPATIENT
Start: 2020-06-11

## 2020-06-11 RX ORDER — SODIUM CHLORIDE 0.9 % (FLUSH) 0.9 %
10 SYRINGE (ML) INJECTION PRN
Status: DISCONTINUED | OUTPATIENT
Start: 2020-06-11 | End: 2020-06-12 | Stop reason: HOSPADM

## 2020-06-11 RX ADMIN — SODIUM CHLORIDE, PRESERVATIVE FREE 10 ML: 5 INJECTION INTRAVENOUS at 09:39

## 2020-06-11 RX ADMIN — Medication 10 ML: at 09:39

## 2020-06-11 RX ADMIN — FERUMOXYTOL 510 MG: 510 INJECTION INTRAVENOUS at 09:49

## 2020-06-11 ASSESSMENT — PAIN SCALES - GENERAL: PAINLEVEL_OUTOF10: 0

## 2020-06-15 ENCOUNTER — ANTI-COAG VISIT (OUTPATIENT)
Dept: FAMILY MEDICINE CLINIC | Age: 70
End: 2020-06-15

## 2020-06-15 ENCOUNTER — HOSPITAL ENCOUNTER (OUTPATIENT)
Age: 70
Discharge: HOME | End: 2020-06-17
Payer: MEDICARE

## 2020-06-15 LAB
ANION GAP SERPL CALCULATED.3IONS-SCNC: 10 MMOL/L (ref 7–16)
BUN BLDV-MCNC: 25 MG/DL (ref 8–23)
CALCIUM SERPL-MCNC: 9.1 MG/DL (ref 8.6–10.2)
CHLORIDE BLD-SCNC: 107 MMOL/L (ref 98–107)
CO2: 23 MMOL/L (ref 22–29)
CREAT SERPL-MCNC: 0.7 MG/DL (ref 0.5–1)
GFR AFRICAN AMERICAN: >60
GFR NON-AFRICAN AMERICAN: >60 ML/MIN/1.73
GLUCOSE BLD-MCNC: 94 MG/DL (ref 74–99)
INR BLD: 2.4
POTASSIUM SERPL-SCNC: 4.5 MMOL/L (ref 3.5–5)
PRO-BNP: 735 PG/ML (ref 0–125)
SODIUM BLD-SCNC: 140 MMOL/L (ref 132–146)

## 2020-06-15 PROCEDURE — 83880 ASSAY OF NATRIURETIC PEPTIDE: CPT

## 2020-06-15 PROCEDURE — 80048 BASIC METABOLIC PNL TOTAL CA: CPT

## 2020-06-15 RX ORDER — BUSPIRONE HYDROCHLORIDE 5 MG/1
TABLET ORAL
Qty: 60 TABLET | Refills: 0 | Status: SHIPPED
Start: 2020-06-15 | End: 2020-07-13

## 2020-06-16 ENCOUNTER — TELEPHONE (OUTPATIENT)
Dept: CARDIOLOGY CLINIC | Age: 70
End: 2020-06-16

## 2020-06-16 RX ORDER — SACUBITRIL AND VALSARTAN 97; 103 MG/1; MG/1
1 TABLET, FILM COATED ORAL 2 TIMES DAILY
Qty: 180 TABLET | Refills: 3 | Status: SHIPPED
Start: 2020-06-16 | End: 2020-07-23

## 2020-06-16 RX ORDER — SACUBITRIL AND VALSARTAN 49; 51 MG/1; MG/1
1 TABLET, FILM COATED ORAL 2 TIMES DAILY
Qty: 180 TABLET | Refills: 3 | Status: SHIPPED
Start: 2020-06-16 | End: 2020-06-16 | Stop reason: DRUGHIGH

## 2020-06-16 NOTE — TELEPHONE ENCOUNTER
Called back   Feeling good  No s/s chf    Was babysitting grand daughter (sleeping) earlier     Applying for PAP via [employer] Has no coverage for entresto. will send to PAP high dose escribed. Takes diuretic every over day but will start to take only as needed. Reviewed s/s chf.      148#   Is average weights now. No dizzy/lightheaded  Urinating good   no swelling. Stopped digoxin 6/16/2020 as instructed. She will f/u with DR Jacobs as instructed from sleep study. Labs in a week.

## 2020-06-19 ENCOUNTER — APPOINTMENT (OUTPATIENT)
Dept: GENERAL RADIOLOGY | Age: 70
End: 2020-06-19
Payer: MEDICARE

## 2020-06-19 ENCOUNTER — TELEPHONE (OUTPATIENT)
Dept: CARDIOLOGY CLINIC | Age: 70
End: 2020-06-19

## 2020-06-19 ENCOUNTER — TELEPHONE (OUTPATIENT)
Dept: FAMILY MEDICINE CLINIC | Age: 70
End: 2020-06-19

## 2020-06-19 ENCOUNTER — TELEPHONE (OUTPATIENT)
Dept: OTHER | Facility: CLINIC | Age: 70
End: 2020-06-19

## 2020-06-19 ENCOUNTER — HOSPITAL ENCOUNTER (EMERGENCY)
Age: 70
Discharge: HOME | End: 2020-06-19
Attending: EMERGENCY MEDICINE
Payer: MEDICARE

## 2020-06-19 VITALS
OXYGEN SATURATION: 96 % | TEMPERATURE: 98 F | WEIGHT: 150 LBS | BODY MASS INDEX: 24.11 KG/M2 | HEART RATE: 105 BPM | DIASTOLIC BLOOD PRESSURE: 90 MMHG | HEIGHT: 66 IN | SYSTOLIC BLOOD PRESSURE: 127 MMHG | RESPIRATION RATE: 22 BRPM

## 2020-06-19 LAB
ALBUMIN SERPL-MCNC: 4.3 G/DL (ref 3.5–5.2)
ALP BLD-CCNC: 106 U/L (ref 35–104)
ALT SERPL-CCNC: 36 U/L (ref 0–32)
ANION GAP SERPL CALCULATED.3IONS-SCNC: 14 MMOL/L (ref 7–16)
ANISOCYTOSIS: ABNORMAL
AST SERPL-CCNC: 38 U/L (ref 0–31)
BASOPHILS ABSOLUTE: 0.04 E9/L (ref 0–0.2)
BASOPHILS RELATIVE PERCENT: 0.6 % (ref 0–2)
BILIRUB SERPL-MCNC: 0.6 MG/DL (ref 0–1.2)
BUN BLDV-MCNC: 19 MG/DL (ref 8–23)
CALCIUM SERPL-MCNC: 9.8 MG/DL (ref 8.6–10.2)
CHLORIDE BLD-SCNC: 103 MMOL/L (ref 98–107)
CO2: 23 MMOL/L (ref 22–29)
CREAT SERPL-MCNC: 0.9 MG/DL (ref 0.5–1)
EOSINOPHILS ABSOLUTE: 0.08 E9/L (ref 0.05–0.5)
EOSINOPHILS RELATIVE PERCENT: 1.2 % (ref 0–6)
GFR AFRICAN AMERICAN: >60
GFR NON-AFRICAN AMERICAN: >60 ML/MIN/1.73
GLUCOSE BLD-MCNC: 90 MG/DL (ref 74–99)
HCT VFR BLD CALC: 40.1 % (ref 34–48)
HEMOGLOBIN: 12.4 G/DL (ref 11.5–15.5)
IMMATURE GRANULOCYTES #: 0.02 E9/L
IMMATURE GRANULOCYTES %: 0.3 % (ref 0–5)
INR BLD: 2.3
LYMPHOCYTES ABSOLUTE: 1.23 E9/L (ref 1.5–4)
LYMPHOCYTES RELATIVE PERCENT: 18.9 % (ref 20–42)
MCH RBC QN AUTO: 25.6 PG (ref 26–35)
MCHC RBC AUTO-ENTMCNC: 30.9 % (ref 32–34.5)
MCV RBC AUTO: 82.7 FL (ref 80–99.9)
MONOCYTES ABSOLUTE: 0.56 E9/L (ref 0.1–0.95)
MONOCYTES RELATIVE PERCENT: 8.6 % (ref 2–12)
NEUTROPHILS ABSOLUTE: 4.58 E9/L (ref 1.8–7.3)
NEUTROPHILS RELATIVE PERCENT: 70.4 % (ref 43–80)
OVALOCYTES: ABNORMAL
PDW BLD-RTO: 21.2 FL (ref 11.5–15)
PLATELET # BLD: 141 E9/L (ref 130–450)
PMV BLD AUTO: 11.3 FL (ref 7–12)
POIKILOCYTES: ABNORMAL
POLYCHROMASIA: ABNORMAL
POTASSIUM REFLEX MAGNESIUM: 3.9 MMOL/L (ref 3.5–5)
PRO-BNP: 1277 PG/ML (ref 0–125)
PROTHROMBIN TIME: 26 SEC (ref 9.3–12.4)
RBC # BLD: 4.85 E12/L (ref 3.5–5.5)
SODIUM BLD-SCNC: 140 MMOL/L (ref 132–146)
TOTAL PROTEIN: 7.5 G/DL (ref 6.4–8.3)
TROPONIN: 0.05 NG/ML (ref 0–0.03)
TROPONIN: 0.05 NG/ML (ref 0–0.03)
TSH SERPL DL<=0.05 MIU/L-ACNC: 1.96 UIU/ML (ref 0.27–4.2)
WBC # BLD: 6.5 E9/L (ref 4.5–11.5)

## 2020-06-19 PROCEDURE — 71045 X-RAY EXAM CHEST 1 VIEW: CPT

## 2020-06-19 PROCEDURE — 84443 ASSAY THYROID STIM HORMONE: CPT

## 2020-06-19 PROCEDURE — 80053 COMPREHEN METABOLIC PANEL: CPT

## 2020-06-19 PROCEDURE — 93005 ELECTROCARDIOGRAM TRACING: CPT | Performed by: EMERGENCY MEDICINE

## 2020-06-19 PROCEDURE — 84484 ASSAY OF TROPONIN QUANT: CPT

## 2020-06-19 PROCEDURE — 85025 COMPLETE CBC W/AUTO DIFF WBC: CPT

## 2020-06-19 PROCEDURE — 2500000003 HC RX 250 WO HCPCS: Performed by: EMERGENCY MEDICINE

## 2020-06-19 PROCEDURE — 85610 PROTHROMBIN TIME: CPT

## 2020-06-19 PROCEDURE — 96374 THER/PROPH/DIAG INJ IV PUSH: CPT

## 2020-06-19 PROCEDURE — 99285 EMERGENCY DEPT VISIT HI MDM: CPT

## 2020-06-19 PROCEDURE — 83880 ASSAY OF NATRIURETIC PEPTIDE: CPT

## 2020-06-19 RX ORDER — METOPROLOL TARTRATE 5 MG/5ML
5 INJECTION INTRAVENOUS ONCE
Status: COMPLETED | OUTPATIENT
Start: 2020-06-19 | End: 2020-06-19

## 2020-06-19 RX ADMIN — METOPROLOL TARTRATE 5 MG: 5 INJECTION INTRAVENOUS at 18:30

## 2020-06-19 ASSESSMENT — ENCOUNTER SYMPTOMS
VOMITING: 0
ABDOMINAL PAIN: 0
COLOR CHANGE: 0
NAUSEA: 0
SHORTNESS OF BREATH: 0

## 2020-06-19 NOTE — ED PROVIDER NOTES
HPI:    Arnaldo Bear is a 69 y.o. female presenting to the ED for tachyardia, beginning today. The complaint has been constant, moderate in severity, and worsened by nothing. Patient reports she had her home health nurse evaluate her today for regularly scheduled session, when they noted patient's heart rate to be in the 120s, prompting visit to the ED. Patient states she is asymptomatic without any chest pain, shortness of breath, palpitations or abdominal pain. Patient denies any recent illness. Patient admits she was just evaluated in the hospital.  They have been adjusting her medications as she was just taken off digoxin. She does have history of atrial fibrillation on Jantoven. The history is provided by the patient. Review of Systems   Constitutional: Negative for chills and fever. HENT: Negative for congestion. Eyes: Negative for visual disturbance. Respiratory: Negative for shortness of breath. Cardiovascular: Negative for chest pain. Gastrointestinal: Negative for abdominal pain, nausea and vomiting. Genitourinary: Negative for difficulty urinating. Musculoskeletal: Negative for neck pain. Skin: Negative for color change. Neurological: Negative for headaches. Psychiatric/Behavioral: Negative for confusion. Physical Exam  Vitals signs and nursing note reviewed. Constitutional:       General: She is not in acute distress. HENT:      Head: Normocephalic and atraumatic. Nose: Nose normal.      Mouth/Throat:      Mouth: Mucous membranes are moist.   Eyes:      General: No scleral icterus. Conjunctiva/sclera: Conjunctivae normal.   Neck:      Musculoskeletal: Normal range of motion and neck supple. No muscular tenderness. Cardiovascular:      Rate and Rhythm: Tachycardia present. Rhythm irregularly irregular. Pulses: Normal pulses. Radial pulses are 2+ on the right side and 2+ on the left side.         Dorsalis pedis pulses are 2+ on hemodynamically stable. Findings were discussed with the patient and reasons to immediately return to the ED were articulated to them. They will follow-up with their PMD and cardiology as we suspect this to be related to her recent medication changes. Patient agrees with the plan, repeated the plan and all questions were answered. ED Course as of Jun 20 1504 Fri Jun 19, 2020   1713 ECHO summary from last month   Summary   Technically suboptimal and limited study. Left ventricle is grossly normal in size . Severely reduced left ventricular systolic function. [MA]   2031 Patient reassessed. Patient no acute distress at this time. Patient remains asymptomatic. Patient aware of results today. Patient feels comfortable going home and follow-up outpatient. [MA]      ED Course User Index  [MA] Veronica Bruce DO          ----------------------------------------------- PAST HISTORY --------------------------------------------  Past Medical History:  has a past medical history of H/O mitral valve replacement, Hypertension, Hypothyroidism, and S/P aortic valve replacement. Past Surgical History:  has a past surgical history that includes ECHO Compl W Dop Color Flow (1/25/2012); Cardiac valve replacement; Cardioversion (05/18/2020); and transesophageal echocardiogram (05/18/2020). Social History:  reports that she quit smoking about 17 months ago. Her smoking use included cigarettes. She has a 4.50 pack-year smoking history. She has never used smokeless tobacco. She reports current alcohol use. She reports that she does not use drugs. Family History: family history is not on file. The patients home medications have been reviewed. Allergies: Patient has no known allergies.     ------------------------------------------------ RESULTS ---------------------------------------------------    LABS:  Results for orders placed or performed during the hospital encounter of 06/19/20   CBC Auto Lead   Result Value Ref Range    Ventricular Rate 122 BPM    Atrial Rate 244 BPM    QRS Duration 94 ms    Q-T Interval 290 ms    QTc Calculation (Bazett) 413 ms    P Axis 116 degrees    R Axis 88 degrees    T Axis -123 degrees       RADIOLOGY:    All Radiology results interpreted by Radiologist unless otherwise noted. XR CHEST PORTABLE   Final Result    1. No acute cardiopulmonary abnormality. 2. Cardiomegaly, with double density sign consistent with left atrial   enlargement. EKG: This EKG is signed and interpreted by ED Physician. Time:  1638   Rate: 122  Rhythm: Atrial flutter. Interpretation: Atrial flutter with variable block, normal axis, no ST elevation, no QTC prolongation  Comparison: stable as compared to patient's most recent EKG.    ---------------------------- NURSING NOTES AND VITALS REVIEWED -------------------------   The nursing notes within the ED encounter and vital signs as below have been reviewed. BP (!) 127/90   Pulse 105   Temp 98 °F (36.7 °C) (Tympanic)   Resp 22   Ht 5' 5.5\" (1.664 m)   Wt 150 lb (68 kg)   SpO2 96%   BMI 24.58 kg/m²   Oxygen Saturation Interpretation: Normal      ------------------------------------------PROGRESS NOTES -------------------------------------------    ED COURSE MEDICATIONS:                Medications   metoprolol (LOPRESSOR) injection 5 mg (5 mg Intravenous Given 6/19/20 1830)       CONSULTATIONS:            none. PROCEDURES:            none. COUNSELING:   I have spoken with the patient and discussed todays results, in addition to providing specific details for the plan of care and counseling regarding the diagnosis and prognosis.     --------------------------------------- IMPRESSION & DISPOSITION --------------------------------     IMPRESSION(s):  1. Persistent atrial fibrillation    2.  Chronic anticoagulation        This patient's ED course included: a personal history and physicial examination, IV medications, cardiac

## 2020-06-20 ENCOUNTER — CARE COORDINATION (OUTPATIENT)
Dept: CARE COORDINATION | Age: 70
End: 2020-06-20

## 2020-06-20 LAB
EKG ATRIAL RATE: 244 BPM
EKG P AXIS: 116 DEGREES
EKG Q-T INTERVAL: 290 MS
EKG QRS DURATION: 94 MS
EKG QTC CALCULATION (BAZETT): 413 MS
EKG R AXIS: 88 DEGREES
EKG T AXIS: -123 DEGREES
EKG VENTRICULAR RATE: 122 BPM

## 2020-06-20 PROCEDURE — 93010 ELECTROCARDIOGRAM REPORT: CPT | Performed by: INTERNAL MEDICINE

## 2020-06-20 NOTE — CARE COORDINATION
Patient also asked this [facility] questions regarding Advanced Directives and if she should have a designated POA, a Living will and a form designating her code status. [facility] strongly encouraged patient to have this paperwork in place, and stated that she should give a copy to her PCP. Patient states understanding. From CDC: Are you at higher risk for severe illness?  Wash your hands often.  Avoid close contact (6 feet, which is about two arm lengths) with people who are sick.  Put distance between yourself and other people if COVID-19 is spreading in your community.  Clean and disinfect frequently touched surfaces.  Avoid all cruise travel and non-essential air travel.  Call your healthcare professional if you have concerns about COVID-19 and your underlying condition or if you are sick. For more information on steps you can take to protect yourself, see CDC's How to [id number] [place] [address] for follow-up call in 7-14 days based on severity of symptoms and risk factors.

## 2020-06-21 NOTE — PROGRESS NOTES
Was discharged from ED  Back in atrial flutter  Looks like she was given just one dose of lopressor in the ED   Joellen Srivastavabrittney please call Monday and assess how she is feeling  Did she need any bumex over the weekend? Does she have any way to assess BP/HR  Has follow up appointment with Dr. Freya Estrada this week. Dr. Freya Estrada - I saw Ms. Zechariah Garcia in post hospital follow up  We have been titrating her GDMT  She has tolerated titration of entresto to high dose  We were limited in Toprol due to resting bradycardia ( HR in 50's with Toprol 25 BID)  She went for a sleep study that was positive - during testing her HR went into 30's    During hospitalization she was cardioverted  and maintaining sinus as well as euvolemia with PRN Bumex ( decreased dosing as we went up on Entresto). Stopped Digoxin due to bradycardia  Visiting nurse on Friday vitals with elevated HR - was seen in ED with no symptoms however back in atrial flutter. You have follow up appointment with her this week and wanted to update you. Thank you.

## 2020-06-22 NOTE — TELEPHONE ENCOUNTER
Simona Stanford  at home now. Todays vitals. BP  87/ 67   Pulse  114    repeat  113/65     90. Asymptomatic no c/o. Was in ER [facility]. Friday   Recent  Stopped Digoxin 6/16/2020 as sleep study showed episodes HR 30's. Maintained metoprolol 25mg BID at that time. Then Friday 6- Edeljake Dougherty nurse visit noted 's sent to ER. INR\"s therapeutic, on coumadin, has not missed any doses. Asymptomatic with 120's Friday also. 6 26 2020 has apt with DR Shwetha Gonsalez     Any advise prior to visit?            ER:  MDM  Number of Diagnoses or Management Options  Chronic anticoagulation:   Persistent atrial fibrillation:   Diagnosis management comments: Patient presents to the ED for above signs and symptoms. Patient arrived with heart rate around 100-1 20 with atrial fibrillation rhythm, consistent with history. Patient was given 1 dose of Lopressor with good relief. Patient remains asymptomatic throughout the encounter. Blood work and imaging obtained. Blood work unremarkable for electrolyte deficits or anemia. Troponin mildly elevated, however consistent with her prior troponin levels. Delta troponin was obtained that showed consistent troponin level. Chest x-ray did not reveal any focal consolidation as well as suspicious of underlying infection at this time. Patient continues to be non-toxic on re-evaluation. Patient is hemodynamically stable. Findings were discussed with the patient and reasons to immediately return to the ED were articulated to them. They will follow-up with their PMD and cardiology as we suspect this to be related to her recent medication changes. Patient agrees with the plan, repeated the plan and all questions were answered. 1. Persistent atrial fibrillation    2.  Chronic anticoagulation        Current Outpatient Medications   Medication Sig Dispense Refill    sacubitril-valsartan (ENTRESTO)  MG per tablet Take 1 tablet by mouth 2 times daily 180 tablet 3    busPIRone

## 2020-06-23 ENCOUNTER — ANTI-COAG VISIT (OUTPATIENT)
Dept: FAMILY MEDICINE CLINIC | Age: 70
End: 2020-06-23

## 2020-06-23 LAB
B-TYPE NATRIURETIC PEPTIDE: NORMAL
BUN BLDV-MCNC: NORMAL MG/DL
CALCIUM SERPL-MCNC: NORMAL MG/DL
CHLORIDE BLD-SCNC: NORMAL MMOL/L
CO2: NORMAL
CREAT SERPL-MCNC: 0.08 MG/DL
GFR CALCULATED: NORMAL
GLUCOSE BLD-MCNC: NORMAL MG/DL
INR BLD: 2.1
POTASSIUM SERPL-SCNC: 5 MMOL/L
SODIUM BLD-SCNC: NORMAL MMOL/L

## 2020-06-26 ENCOUNTER — OFFICE VISIT (OUTPATIENT)
Dept: CARDIOLOGY CLINIC | Age: 70
End: 2020-06-26
Payer: MEDICARE

## 2020-06-26 VITALS
WEIGHT: 150.5 LBS | HEART RATE: 120 BPM | BODY MASS INDEX: 25.08 KG/M2 | HEIGHT: 65 IN | SYSTOLIC BLOOD PRESSURE: 104 MMHG | RESPIRATION RATE: 16 BRPM | DIASTOLIC BLOOD PRESSURE: 62 MMHG

## 2020-06-26 PROCEDURE — G8400 PT W/DXA NO RESULTS DOC: HCPCS | Performed by: INTERNAL MEDICINE

## 2020-06-26 PROCEDURE — 1090F PRES/ABSN URINE INCON ASSESS: CPT | Performed by: INTERNAL MEDICINE

## 2020-06-26 PROCEDURE — 4040F PNEUMOC VAC/ADMIN/RCVD: CPT | Performed by: INTERNAL MEDICINE

## 2020-06-26 PROCEDURE — 93000 ELECTROCARDIOGRAM COMPLETE: CPT | Performed by: INTERNAL MEDICINE

## 2020-06-26 PROCEDURE — 1036F TOBACCO NON-USER: CPT | Performed by: INTERNAL MEDICINE

## 2020-06-26 PROCEDURE — 1123F ACP DISCUSS/DSCN MKR DOCD: CPT | Performed by: INTERNAL MEDICINE

## 2020-06-26 PROCEDURE — G8417 CALC BMI ABV UP PARAM F/U: HCPCS | Performed by: INTERNAL MEDICINE

## 2020-06-26 PROCEDURE — 99214 OFFICE O/P EST MOD 30 MIN: CPT | Performed by: INTERNAL MEDICINE

## 2020-06-26 PROCEDURE — G8427 DOCREV CUR MEDS BY ELIG CLIN: HCPCS | Performed by: INTERNAL MEDICINE

## 2020-06-26 PROCEDURE — 3017F COLORECTAL CA SCREEN DOC REV: CPT | Performed by: INTERNAL MEDICINE

## 2020-06-26 RX ORDER — DIGOXIN 125 MCG
125 TABLET ORAL DAILY
Qty: 90 TABLET | Refills: 3 | Status: SHIPPED
Start: 2020-06-26 | End: 2020-12-02

## 2020-06-26 NOTE — PROGRESS NOTES
file    Food insecurity     Worry: Not on file     Inability: Not on file    Transportation needs     Medical: Not on file     Non-medical: Not on file   Tobacco Use    Smoking status: Former Smoker     Packs/day: 0.10     Years: 45.00     Pack years: 4.50     Types: Cigarettes     Last attempt to quit: 2019     Years since quittin.4    Smokeless tobacco: Never Used   Substance and Sexual Activity    Alcohol use: Yes     Comment: rare; 2-3 cups coffee daily (decaf)    Drug use: No    Sexual activity: Yes     Partners: Male   Lifestyle    Physical activity     Days per week: Not on file     Minutes per session: Not on file    Stress: Not on file   Relationships    Social connections     Talks on phone: Not on file     Gets together: Not on file     Attends Gnosticist service: Not on file     Active member of club or organization: Not on file     Attends meetings of clubs or organizations: Not on file     Relationship status: Not on file    Intimate partner violence     Fear of current or ex partner: Not on file     Emotionally abused: Not on file     Physically abused: Not on file     Forced sexual activity: Not on file   Other Topics Concern    Not on file   Social History Narrative    Not on file       History reviewed. No pertinent family history. Review of Systems:  Heart: as above   Lungs: as above   Eyes: denies changes in vision or discharge. Ears: denies changes in hearing or pain. Nose: denies epistaxis or masses   Throat: denies sore throat or trouble swallowing. Neuro: denies numbness, tingling, tremors. Skin: denies rashes or itching. : denies hematuria, dysuria   GI: denies vomiting, diarrhea   Psych: denies mood changed, anxiety, depression. All other systems negative. Physical Exam   /62   Pulse 120   Resp 16   Ht 5' 5\" (1.651 m)   Wt 150 lb 8 oz (68.3 kg)   BMI 25.04 kg/m²   Constitutional: Oriented to person, place, and time.  Well-developed and

## 2020-07-06 ENCOUNTER — CARE COORDINATION (OUTPATIENT)
Dept: CARE COORDINATION | Age: 70
End: 2020-07-06

## 2020-07-08 ENCOUNTER — ANTI-COAG VISIT (OUTPATIENT)
Dept: FAMILY MEDICINE CLINIC | Age: 70
End: 2020-07-08

## 2020-07-08 LAB
INR BLD: 1.9
INR BLD: 1.9
PROTIME: 20.5 SECONDS

## 2020-07-13 RX ORDER — BUSPIRONE HYDROCHLORIDE 5 MG/1
TABLET ORAL
Qty: 60 TABLET | Refills: 0 | Status: SHIPPED
Start: 2020-07-13 | End: 2020-08-12

## 2020-07-22 LAB — INR BLD: 2.1

## 2020-07-23 ENCOUNTER — ANTI-COAG VISIT (OUTPATIENT)
Dept: FAMILY MEDICINE CLINIC | Age: 70
End: 2020-07-23

## 2020-07-23 ENCOUNTER — OFFICE VISIT (OUTPATIENT)
Dept: CARDIOLOGY CLINIC | Age: 70
End: 2020-07-23
Payer: MEDICARE

## 2020-07-23 VITALS
HEART RATE: 46 BPM | SYSTOLIC BLOOD PRESSURE: 90 MMHG | RESPIRATION RATE: 20 BRPM | DIASTOLIC BLOOD PRESSURE: 62 MMHG | WEIGHT: 152.2 LBS | BODY MASS INDEX: 24.46 KG/M2 | OXYGEN SATURATION: 99 % | TEMPERATURE: 97.4 F | HEIGHT: 66 IN

## 2020-07-23 PROCEDURE — 3017F COLORECTAL CA SCREEN DOC REV: CPT | Performed by: INTERNAL MEDICINE

## 2020-07-23 PROCEDURE — G8400 PT W/DXA NO RESULTS DOC: HCPCS | Performed by: INTERNAL MEDICINE

## 2020-07-23 PROCEDURE — G8420 CALC BMI NORM PARAMETERS: HCPCS | Performed by: INTERNAL MEDICINE

## 2020-07-23 PROCEDURE — 1036F TOBACCO NON-USER: CPT | Performed by: INTERNAL MEDICINE

## 2020-07-23 PROCEDURE — 4040F PNEUMOC VAC/ADMIN/RCVD: CPT | Performed by: INTERNAL MEDICINE

## 2020-07-23 PROCEDURE — 93000 ELECTROCARDIOGRAM COMPLETE: CPT | Performed by: INTERNAL MEDICINE

## 2020-07-23 PROCEDURE — 1090F PRES/ABSN URINE INCON ASSESS: CPT | Performed by: INTERNAL MEDICINE

## 2020-07-23 PROCEDURE — G8427 DOCREV CUR MEDS BY ELIG CLIN: HCPCS | Performed by: INTERNAL MEDICINE

## 2020-07-23 PROCEDURE — 99214 OFFICE O/P EST MOD 30 MIN: CPT | Performed by: INTERNAL MEDICINE

## 2020-07-23 PROCEDURE — 1123F ACP DISCUSS/DSCN MKR DOCD: CPT | Performed by: INTERNAL MEDICINE

## 2020-07-23 RX ORDER — SACUBITRIL AND VALSARTAN 97; 103 MG/1; MG/1
0.5 TABLET, FILM COATED ORAL 2 TIMES DAILY
Qty: 90 TABLET | Refills: 3 | Status: SHIPPED | OUTPATIENT
Start: 2020-07-23 | End: 2020-12-02

## 2020-07-23 RX ORDER — METOPROLOL SUCCINATE 50 MG/1
25 TABLET, EXTENDED RELEASE ORAL DAILY
Qty: 90 TABLET | Refills: 3 | Status: SHIPPED | OUTPATIENT
Start: 2020-07-23 | End: 2020-12-02

## 2020-07-23 NOTE — PROGRESS NOTES
Left detailed message regarding INR, instructions and new test date. Advised in message to please test in morning as the company is taking a significant amount of time to report result to office.

## 2020-07-23 NOTE — PATIENT INSTRUCTIONS
Reduce toprol to 25 mg daily (1/2 a 50 mg daily)    Reduce Entresto 97 mg/103 mg to 1/2 a pill twice a day.

## 2020-07-23 NOTE — PROGRESS NOTES
CHIEF COMPLAINT: CHF/Afib    HISTORY OF PRESENT ILLNESS: Patient is a 69 y.o. female seen at the request of Randi Grigsby DO. Baseline OCONNOR. No CP. Past Medical History:   Diagnosis Date    H/O mitral valve replacement     Hypertension     Hypothyroidism     S/P aortic valve replacement        Patient Active Problem List   Diagnosis    Macular hole of right eye    Acquired hypothyroidism    H/O aortic valve replacement    H/O mitral valve replacement    NSTEMI (non-ST elevated myocardial infarction) (HCC)    Atrial flutter with rapid ventricular response (HCC)    Sick sinus syndrome (HCC)    Acute on chronic systolic congestive heart failure (HCC)    Cardiomyopathy (HCC)    Iron deficiency anemia       No Known Allergies    Current Outpatient Medications   Medication Sig Dispense Refill    sacubitril-valsartan (ENTRESTO)  MG per tablet Take 0.5 tablets by mouth 2 times daily 90 tablet 3    metoprolol succinate (TOPROL XL) 50 MG extended release tablet Take 0.5 tablets by mouth daily 90 tablet 3    busPIRone (BUSPAR) 5 MG tablet TAKE ONE TABLET BY MOUTH TWO TIMES A DAY 60 tablet 0    digoxin (LANOXIN) 125 MCG tablet Take 1 tablet by mouth daily 90 tablet 3    bumetanide (BUMEX) 1 MG tablet Take 1 tablet by mouth daily as needed (weight gain, swelling, shortness of breath) 30 tablet 3    warfarin (COUMADIN) 10 MG tablet Take 1 tablet by mouth daily 30 tablet 1    acetaminophen (TYLENOL) 325 MG tablet Take 2 tablets by mouth every 6 hours as needed for Pain or Fever 120 tablet 3    levothyroxine (SYNTHROID) 100 MCG tablet Take 1 tablet by mouth Daily 30 tablet 3     No current facility-administered medications for this visit.         Social History     Socioeconomic History    Marital status:      Spouse name: Not on file    Number of children: Not on file    Years of education: Not on file    Highest education level: Not on file   Occupational History    Occupation: retired   Social Needs    Financial resource strain: Not on file    Food insecurity     Worry: Not on file     Inability: Not on file   [employer] needs     Medical: Not on file     Non-medical: Not on file   Tobacco Use    Smoking status: Former Smoker     Packs/day: 0.10     Years: 45.00     Pack years: 4.50     Types: Cigarettes     Last attempt to quit: 2019     Years since quittin.5    Smokeless tobacco: Never Used   Substance and Sexual Activity    Alcohol use: Yes     Comment: rare; 2-3 cups coffee daily (decaf)    Drug use: No    Sexual activity: Yes     Partners: Male   Lifestyle    Physical activity     Days per week: Not on file     Minutes per session: Not on file    Stress: Not on file   Relationships    Social connections     Talks on phone: Not on file     Gets together: Not on file     Attends Islam service: Not on file     Active member of club or organization: Not on file     Attends meetings of clubs or organizations: Not on file     Relationship status: Not on file    Intimate partner violence     Fear of current or ex partner: Not on file     Emotionally abused: Not on file     Physically abused: Not on file     Forced sexual activity: Not on file   Other Topics Concern    Not on file   Social History Narrative    Not on file       History reviewed. No pertinent family history. Review of Systems:  Heart: as above   Lungs: as above   Eyes: denies changes in vision or discharge. Ears: denies changes in hearing or pain. Nose: denies epistaxis or masses   Throat: denies sore throat or trouble swallowing. Neuro: denies numbness, tingling, tremors. Skin: denies rashes or itching. : denies hematuria, dysuria   GI: denies vomiting, diarrhea   Psych: denies mood changed, anxiety, depression. All other systems negative.     Physical Exam   BP 90/62   Pulse (!) 46   Temp 97.4 °F (36.3 °C)   Resp 20   Ht 5' 5.5\" (1.664 m)   Wt 152 lb 3.2 oz (69 kg) Griselda Kurk.   Cardiologist  Cardiology, [facility]

## 2020-08-12 RX ORDER — BUSPIRONE HYDROCHLORIDE 5 MG/1
TABLET ORAL
Qty: 60 TABLET | Refills: 0 | Status: SHIPPED
Start: 2020-08-12 | End: 2020-09-11

## 2020-08-13 ENCOUNTER — ANTI-COAG VISIT (OUTPATIENT)
Dept: FAMILY MEDICINE CLINIC | Age: 70
End: 2020-08-13

## 2020-08-13 LAB — INR BLD: 2.4

## 2020-08-26 ENCOUNTER — OFFICE VISIT (OUTPATIENT)
Dept: CARDIOLOGY CLINIC | Age: 70
End: 2020-08-26
Payer: MEDICARE

## 2020-08-26 VITALS
HEART RATE: 57 BPM | DIASTOLIC BLOOD PRESSURE: 60 MMHG | RESPIRATION RATE: 18 BRPM | HEIGHT: 65 IN | WEIGHT: 155.6 LBS | BODY MASS INDEX: 25.92 KG/M2 | SYSTOLIC BLOOD PRESSURE: 110 MMHG

## 2020-08-26 PROCEDURE — G8427 DOCREV CUR MEDS BY ELIG CLIN: HCPCS | Performed by: INTERNAL MEDICINE

## 2020-08-26 PROCEDURE — 93000 ELECTROCARDIOGRAM COMPLETE: CPT | Performed by: INTERNAL MEDICINE

## 2020-08-26 PROCEDURE — 4040F PNEUMOC VAC/ADMIN/RCVD: CPT | Performed by: INTERNAL MEDICINE

## 2020-08-26 PROCEDURE — G8417 CALC BMI ABV UP PARAM F/U: HCPCS | Performed by: INTERNAL MEDICINE

## 2020-08-26 PROCEDURE — 1036F TOBACCO NON-USER: CPT | Performed by: INTERNAL MEDICINE

## 2020-08-26 PROCEDURE — G8400 PT W/DXA NO RESULTS DOC: HCPCS | Performed by: INTERNAL MEDICINE

## 2020-08-26 PROCEDURE — 1123F ACP DISCUSS/DSCN MKR DOCD: CPT | Performed by: INTERNAL MEDICINE

## 2020-08-26 PROCEDURE — 1090F PRES/ABSN URINE INCON ASSESS: CPT | Performed by: INTERNAL MEDICINE

## 2020-08-26 PROCEDURE — 99214 OFFICE O/P EST MOD 30 MIN: CPT | Performed by: INTERNAL MEDICINE

## 2020-08-26 PROCEDURE — 3017F COLORECTAL CA SCREEN DOC REV: CPT | Performed by: INTERNAL MEDICINE

## 2020-08-26 NOTE — PROGRESS NOTES
CHIEF COMPLAINT: CHF/Afib/AVR-MVR    HISTORY OF PRESENT ILLNESS: Patient is a 69 y.o. female seen at the request of Olena Michel DO. Baseline OCONNOR. No CP. Past Medical History:   Diagnosis Date    H/O mitral valve replacement     Hypertension     Hypothyroidism     S/P aortic valve replacement        Patient Active Problem List   Diagnosis    Macular hole of right eye    Acquired hypothyroidism    H/O aortic valve replacement    H/O mitral valve replacement    NSTEMI (non-ST elevated myocardial infarction) (HCC)    Atrial flutter with rapid ventricular response (HCC)    Sick sinus syndrome (HCC)    Acute on chronic systolic congestive heart failure (HCC)    Cardiomyopathy (HCC)    Iron deficiency anemia       No Known Allergies    Current Outpatient Medications   Medication Sig Dispense Refill    busPIRone (BUSPAR) 5 MG tablet TAKE ONE TABLET BY MOUTH TWO TIMES A DAY 60 tablet 0    sacubitril-valsartan (ENTRESTO)  MG per tablet Take 0.5 tablets by mouth 2 times daily 90 tablet 3    metoprolol succinate (TOPROL XL) 50 MG extended release tablet Take 0.5 tablets by mouth daily 90 tablet 3    digoxin (LANOXIN) 125 MCG tablet Take 1 tablet by mouth daily 90 tablet 3    bumetanide (BUMEX) 1 MG tablet Take 1 tablet by mouth daily as needed (weight gain, swelling, shortness of breath) 30 tablet 3    warfarin (COUMADIN) 10 MG tablet Take 1 tablet by mouth daily 30 tablet 1    acetaminophen (TYLENOL) 325 MG tablet Take 2 tablets by mouth every 6 hours as needed for Pain or Fever 120 tablet 3    levothyroxine (SYNTHROID) 100 MCG tablet Take 1 tablet by mouth Daily 30 tablet 3     No current facility-administered medications for this visit.         Social History     Socioeconomic History    Marital status:      Spouse name: Not on file    Number of children: Not on file    Years of education: Not on file    Highest education level: Not on file   Occupational kg/m²   Constitutional: Oriented to person, place, and time. Well-developed and well-nourished. No distress. Head: Normocephalic and atraumatic. Eyes: EOM are normal. Pupils are equal, round, and reactive to light. Neck: Normal range of motion. Neck supple. No hepatojugular reflux and no JVD present. Carotid bruit is not present. No tracheal deviation present. No thyromegaly present. Cardiovascular: Normal rate, regular rhythm, normal heart sounds and intact distal pulses. Exam reveals no gallop and no friction rub. No murmur heard. Pulmonary/Chest: Effort normal and breath sounds normal. No respiratory distress. No wheezes. No rales. No tenderness. Abdominal: Soft. Bowel sounds are normal. No distension and no mass. No tenderness. No rebound and no guarding. Musculoskeletal: Normal range of motion. No edema and no tenderness. Lymphadenopathy:   No cervical adenopathy. No groin adenopathy. Neurological: Alert and oriented to person, place, and time. Skin: Skin is warm and dry. No rash noted. Not diaphoretic. No erythema. Psychiatric: Normal mood and affect. Behavior is normal.     EKG: Atrial flutter, nonspecific ST and T waves changes. ASSESSMENT AND PLAN:  Patient Active Problem List   Diagnosis    Macular hole of right eye    Acquired hypothyroidism    H/O aortic valve replacement    H/O mitral valve replacement    NSTEMI (non-ST elevated myocardial infarction) ([facility] [address]    Atrial flutter with rapid ventricular response (HCC)    Sick sinus syndrome (HCC)    Acute on chronic systolic congestive heart failure (HCC)    Cardiomyopathy (HCC)    Iron deficiency anemia     1. Chronic systolic HF:     Toprol/Entresto/dig. 2. Chronic Aflutter:     Rate controlled. On warfarin. 3. History of aortic and mitral valve replacements:     Echo to guide therapy next visit. 4. Elevated troponin: Stress normal perfusion 5/19/2020.    5. HTN: Observe.     Jose Palacios KRISTOPHER  Cardiologist  Cardiology, [facility]) Physicians

## 2020-09-09 ENCOUNTER — ANTI-COAG VISIT (OUTPATIENT)
Dept: FAMILY MEDICINE CLINIC | Age: 70
End: 2020-09-09

## 2020-09-09 LAB — INR BLD: 3.4

## 2020-09-11 RX ORDER — BUSPIRONE HYDROCHLORIDE 5 MG/1
TABLET ORAL
Qty: 60 TABLET | Refills: 0 | Status: SHIPPED
Start: 2020-09-11 | End: 2020-10-12

## 2020-09-16 ENCOUNTER — ANTI-COAG VISIT (OUTPATIENT)
Dept: FAMILY MEDICINE CLINIC | Age: 70
End: 2020-09-16

## 2020-09-16 LAB — INR BLD: 2.2

## 2020-10-01 ENCOUNTER — ANTI-COAG VISIT (OUTPATIENT)
Dept: FAMILY MEDICINE CLINIC | Age: 70
End: 2020-10-01

## 2020-10-01 LAB — INR BLD: 3.2

## 2020-10-12 RX ORDER — BUMETANIDE 1 MG/1
TABLET ORAL
Qty: 30 TABLET | Refills: 3 | Status: SHIPPED
Start: 2020-10-12 | End: 2020-12-02

## 2020-10-12 RX ORDER — BUSPIRONE HYDROCHLORIDE 5 MG/1
TABLET ORAL
Qty: 60 TABLET | Refills: 0 | Status: SHIPPED
Start: 2020-10-12 | End: 2020-11-16

## 2020-10-29 LAB — INR BLD: 3.7

## 2020-10-30 ENCOUNTER — ANTI-COAG VISIT (OUTPATIENT)
Dept: FAMILY MEDICINE CLINIC | Age: 70
End: 2020-10-30

## 2020-11-09 ENCOUNTER — ANTI-COAG VISIT (OUTPATIENT)
Dept: FAMILY MEDICINE CLINIC | Age: 70
End: 2020-11-09

## 2020-11-09 LAB — INR BLD: 1.9

## 2020-11-16 RX ORDER — BUSPIRONE HYDROCHLORIDE 5 MG/1
TABLET ORAL
Qty: 60 TABLET | Refills: 0 | Status: SHIPPED | OUTPATIENT
Start: 2020-11-16 | End: 2020-12-02

## 2020-11-30 ENCOUNTER — TELEPHONE (OUTPATIENT)
Dept: CARDIOLOGY CLINIC | Age: 70
End: 2020-11-30

## 2020-11-30 ENCOUNTER — ANTI-COAG VISIT (OUTPATIENT)
Dept: FAMILY MEDICINE CLINIC | Age: 70
End: 2020-11-30

## 2020-11-30 LAB — INR BLD: 5.6

## 2020-11-30 NOTE — TELEPHONE ENCOUNTER
Pt. States that for the past 6days she has been having dizziness and lightheaded she can't carry her weight, she has SOB on exertion.  she states her bp has been low:  bp                 pulse  71/50              106  80/57              101  82/55               98

## 2020-12-02 ENCOUNTER — TELEPHONE (OUTPATIENT)
Dept: CARDIOLOGY CLINIC | Age: 70
End: 2020-12-02

## 2020-12-02 ENCOUNTER — APPOINTMENT (OUTPATIENT)
Dept: GENERAL RADIOLOGY | Age: 70
DRG: 377 | End: 2020-12-02
Payer: MEDICARE

## 2020-12-02 ENCOUNTER — HOSPITAL ENCOUNTER (OUTPATIENT)
Age: 70
Discharge: HOME | End: 2020-12-02
Payer: MEDICARE

## 2020-12-02 ENCOUNTER — HOSPITAL ENCOUNTER (INPATIENT)
Age: 70
LOS: 3 days | Discharge: HOME | DRG: 377 | End: 2020-12-05
Attending: EMERGENCY MEDICINE | Admitting: INTERNAL MEDICINE
Payer: MEDICARE

## 2020-12-02 ENCOUNTER — OFFICE VISIT (OUTPATIENT)
Dept: CARDIOLOGY CLINIC | Age: 70
End: 2020-12-02
Payer: MEDICARE

## 2020-12-02 VITALS
DIASTOLIC BLOOD PRESSURE: 48 MMHG | BODY MASS INDEX: 26.46 KG/M2 | HEART RATE: 92 BPM | SYSTOLIC BLOOD PRESSURE: 72 MMHG | RESPIRATION RATE: 18 BRPM | HEIGHT: 65 IN | WEIGHT: 158.8 LBS

## 2020-12-02 PROBLEM — K92.2 UGIB (UPPER GASTROINTESTINAL BLEED): Status: ACTIVE | Noted: 2020-12-02

## 2020-12-02 LAB
ABO/RH: NORMAL
ADENOVIRUS BY PCR: NOT DETECTED
ALBUMIN SERPL-MCNC: 3.7 G/DL (ref 3.5–5.2)
ALBUMIN SERPL-MCNC: 3.8 G/DL (ref 3.5–5.2)
ALP BLD-CCNC: 93 U/L (ref 35–104)
ALP BLD-CCNC: 93 U/L (ref 35–104)
ALT SERPL-CCNC: 31 U/L (ref 0–32)
ALT SERPL-CCNC: 36 U/L (ref 0–32)
ANION GAP SERPL CALCULATED.3IONS-SCNC: 17 MMOL/L (ref 7–16)
ANION GAP SERPL CALCULATED.3IONS-SCNC: 17 MMOL/L (ref 7–16)
ANISOCYTOSIS: ABNORMAL
ANISOCYTOSIS: ABNORMAL
ANTIBODY SCREEN: NORMAL
AST SERPL-CCNC: 45 U/L (ref 0–31)
AST SERPL-CCNC: 50 U/L (ref 0–31)
B.E.: -9.9 MMOL/L (ref -3–3)
BASOPHILS ABSOLUTE: 0 E9/L (ref 0–0.2)
BASOPHILS ABSOLUTE: 0.03 E9/L (ref 0–0.2)
BASOPHILS RELATIVE PERCENT: 0 % (ref 0–2)
BASOPHILS RELATIVE PERCENT: 0.2 % (ref 0–2)
BILIRUB SERPL-MCNC: 0.8 MG/DL (ref 0–1.2)
BILIRUB SERPL-MCNC: 0.8 MG/DL (ref 0–1.2)
BILIRUBIN DIRECT: 0.2 MG/DL (ref 0–0.3)
BILIRUBIN, INDIRECT: 0.6 MG/DL (ref 0–1)
BLOOD BANK DISPENSE STATUS: NORMAL
BLOOD BANK PRODUCT CODE: NORMAL
BORDETELLA PARAPERTUSSIS BY PCR: NOT DETECTED
BORDETELLA PERTUSSIS BY PCR: NOT DETECTED
BPU ID: NORMAL
BUN BLDV-MCNC: 41 MG/DL (ref 8–23)
BUN BLDV-MCNC: 43 MG/DL (ref 8–23)
CALCIUM SERPL-MCNC: 8.4 MG/DL (ref 8.6–10.2)
CALCIUM SERPL-MCNC: 8.6 MG/DL (ref 8.6–10.2)
CHLAMYDOPHILIA PNEUMONIAE BY PCR: NOT DETECTED
CHLORIDE BLD-SCNC: 104 MMOL/L (ref 98–107)
CHLORIDE BLD-SCNC: 104 MMOL/L (ref 98–107)
CO2: 14 MMOL/L (ref 22–29)
CO2: 14 MMOL/L (ref 22–29)
COHB: 1.6 % (ref 0–1.5)
CORONAVIRUS 229E BY PCR: NOT DETECTED
CORONAVIRUS HKU1 BY PCR: NOT DETECTED
CORONAVIRUS NL63 BY PCR: NOT DETECTED
CORONAVIRUS OC43 BY PCR: NOT DETECTED
CREAT SERPL-MCNC: 1.4 MG/DL (ref 0.5–1)
CREAT SERPL-MCNC: 1.4 MG/DL (ref 0.5–1)
CRITICAL: ABNORMAL
DATE ANALYZED: ABNORMAL
DATE OF COLLECTION: ABNORMAL
DESCRIPTION BLOOD BANK: NORMAL
DIGOXIN LEVEL: 1.5 NG/ML (ref 0.8–2)
EOSINOPHILS ABSOLUTE: 0 E9/L (ref 0.05–0.5)
EOSINOPHILS ABSOLUTE: 0 E9/L (ref 0.05–0.5)
EOSINOPHILS RELATIVE PERCENT: 0 % (ref 0–6)
EOSINOPHILS RELATIVE PERCENT: 0 % (ref 0–6)
GFR AFRICAN AMERICAN: 45
GFR AFRICAN AMERICAN: 45
GFR NON-AFRICAN AMERICAN: 37 ML/MIN/1.73
GFR NON-AFRICAN AMERICAN: 37 ML/MIN/1.73
GLUCOSE BLD-MCNC: 122 MG/DL (ref 74–99)
GLUCOSE BLD-MCNC: 126 MG/DL (ref 74–99)
HCO3: 14.5 MMOL/L (ref 22–26)
HCT VFR BLD CALC: 13.5 % (ref 34–48)
HCT VFR BLD CALC: 13.8 % (ref 34–48)
HEMOGLOBIN: 3.5 G/DL (ref 11.5–15.5)
HEMOGLOBIN: 3.5 G/DL (ref 11.5–15.5)
HHB: 0.7 % (ref 0–5)
HUMAN METAPNEUMOVIRUS BY PCR: NOT DETECTED
HUMAN RHINOVIRUS/ENTEROVIRUS BY PCR: NOT DETECTED
HYPOCHROMIA: ABNORMAL
HYPOCHROMIA: ABNORMAL
IMMATURE GRANULOCYTES #: 0.07 E9/L
IMMATURE GRANULOCYTES %: 0.5 % (ref 0–5)
INFLUENZA A BY PCR: NOT DETECTED
INFLUENZA B BY PCR: NOT DETECTED
INR BLD: 3
INR BLD: 3.1
LAB: ABNORMAL
LYMPHOCYTES ABSOLUTE: 0.77 E9/L (ref 1.5–4)
LYMPHOCYTES ABSOLUTE: 1.22 E9/L (ref 1.5–4)
LYMPHOCYTES RELATIVE PERCENT: 6 % (ref 20–42)
LYMPHOCYTES RELATIVE PERCENT: 9 % (ref 20–42)
Lab: ABNORMAL
MCH RBC QN AUTO: 21 PG (ref 26–35)
MCH RBC QN AUTO: 21.7 PG (ref 26–35)
MCHC RBC AUTO-ENTMCNC: 25.4 % (ref 32–34.5)
MCHC RBC AUTO-ENTMCNC: 25.9 % (ref 32–34.5)
MCV RBC AUTO: 82.6 FL (ref 80–99.9)
MCV RBC AUTO: 83.9 FL (ref 80–99.9)
METAMYELOCYTES RELATIVE PERCENT: 3 % (ref 0–1)
METHB: 0.8 % (ref 0–1.5)
MODE: ABNORMAL
MONOCYTES ABSOLUTE: 0.27 E9/L (ref 0.1–0.95)
MONOCYTES ABSOLUTE: 0.84 E9/L (ref 0.1–0.95)
MONOCYTES RELATIVE PERCENT: 2 % (ref 2–12)
MONOCYTES RELATIVE PERCENT: 6.5 % (ref 2–12)
MYCOPLASMA PNEUMONIAE BY PCR: NOT DETECTED
NEUTROPHILS ABSOLUTE: 11.12 E9/L (ref 1.8–7.3)
NEUTROPHILS ABSOLUTE: 12.1 E9/L (ref 1.8–7.3)
NEUTROPHILS RELATIVE PERCENT: 86 % (ref 43–80)
NEUTROPHILS RELATIVE PERCENT: 86.8 % (ref 43–80)
O2 CONTENT: 7.2 ML/DL
O2 SATURATION: 99.3 % (ref 92–98.5)
O2HB: 96.9 % (ref 94–97)
OPERATOR ID: 101
OVALOCYTES: ABNORMAL
PARAINFLUENZA VIRUS 1 BY PCR: NOT DETECTED
PARAINFLUENZA VIRUS 2 BY PCR: NOT DETECTED
PARAINFLUENZA VIRUS 3 BY PCR: NOT DETECTED
PARAINFLUENZA VIRUS 4 BY PCR: NOT DETECTED
PATIENT TEMP: 37 C
PCO2: 25.5 MMHG (ref 35–45)
PDW BLD-RTO: 20.7 FL (ref 11.5–15)
PDW BLD-RTO: 21 FL (ref 11.5–15)
PH BLOOD GAS: 7.37 (ref 7.35–7.45)
PLATELET # BLD: 295 E9/L (ref 130–450)
PLATELET # BLD: 310 E9/L (ref 130–450)
PMV BLD AUTO: 11 FL (ref 7–12)
PMV BLD AUTO: 11.9 FL (ref 7–12)
PO2: 142.6 MMHG (ref 75–100)
POIKILOCYTES: ABNORMAL
POIKILOCYTES: ABNORMAL
POLYCHROMASIA: ABNORMAL
POLYCHROMASIA: ABNORMAL
POTASSIUM SERPL-SCNC: 5.2 MMOL/L (ref 3.5–5)
POTASSIUM SERPL-SCNC: 5.3 MMOL/L (ref 3.5–5)
PRO-BNP: 3575 PG/ML (ref 0–125)
PROTHROMBIN TIME: 35.1 SEC (ref 9.3–12.4)
RBC # BLD: 1.61 E12/L (ref 3.5–5.5)
RBC # BLD: 1.67 E12/L (ref 3.5–5.5)
RESPIRATORY SYNCYTIAL VIRUS BY PCR: NOT DETECTED
SARS-COV-2, PCR: NOT DETECTED
SCHISTOCYTES: ABNORMAL
SODIUM BLD-SCNC: 135 MMOL/L (ref 132–146)
SODIUM BLD-SCNC: 135 MMOL/L (ref 132–146)
SOURCE, BLOOD GAS: ABNORMAL
STOMATOCYTES: ABNORMAL
TEAR DROP CELLS: ABNORMAL
THB: 5 G/DL (ref 11.5–16.5)
TIME ANALYZED: 1851
TOTAL PROTEIN: 5.8 G/DL (ref 6.4–8.3)
TOTAL PROTEIN: 6.1 G/DL (ref 6.4–8.3)
TROPONIN: 0.09 NG/ML (ref 0–0.03)
TSH SERPL DL<=0.05 MIU/L-ACNC: 2.89 UIU/ML (ref 0.27–4.2)
WBC # BLD: 12.8 E9/L (ref 4.5–11.5)
WBC # BLD: 13.6 E9/L (ref 4.5–11.5)

## 2020-12-02 PROCEDURE — 93005 ELECTROCARDIOGRAM TRACING: CPT | Performed by: NURSE PRACTITIONER

## 2020-12-02 PROCEDURE — 1090F PRES/ABSN URINE INCON ASSESS: CPT | Performed by: INTERNAL MEDICINE

## 2020-12-02 PROCEDURE — 36415 COLL VENOUS BLD VENIPUNCTURE: CPT

## 2020-12-02 PROCEDURE — 1036F TOBACCO NON-USER: CPT | Performed by: INTERNAL MEDICINE

## 2020-12-02 PROCEDURE — 93000 ELECTROCARDIOGRAM COMPLETE: CPT | Performed by: INTERNAL MEDICINE

## 2020-12-02 PROCEDURE — 1123F ACP DISCUSS/DSCN MKR DOCD: CPT | Performed by: INTERNAL MEDICINE

## 2020-12-02 PROCEDURE — 80162 ASSAY OF DIGOXIN TOTAL: CPT

## 2020-12-02 PROCEDURE — 85025 COMPLETE CBC W/AUTO DIFF WBC: CPT

## 2020-12-02 PROCEDURE — 3017F COLORECTAL CA SCREEN DOC REV: CPT | Performed by: INTERNAL MEDICINE

## 2020-12-02 PROCEDURE — P9016 RBC LEUKOCYTES REDUCED: HCPCS

## 2020-12-02 PROCEDURE — 80048 BASIC METABOLIC PNL TOTAL CA: CPT

## 2020-12-02 PROCEDURE — 86901 BLOOD TYPING SEROLOGIC RH(D): CPT

## 2020-12-02 PROCEDURE — 85610 PROTHROMBIN TIME: CPT

## 2020-12-02 PROCEDURE — 0202U NFCT DS 22 TRGT SARS-COV-2: CPT

## 2020-12-02 PROCEDURE — 99214 OFFICE O/P EST MOD 30 MIN: CPT | Performed by: INTERNAL MEDICINE

## 2020-12-02 PROCEDURE — 84484 ASSAY OF TROPONIN QUANT: CPT

## 2020-12-02 PROCEDURE — 86850 RBC ANTIBODY SCREEN: CPT

## 2020-12-02 PROCEDURE — 99283 EMERGENCY DEPT VISIT LOW MDM: CPT

## 2020-12-02 PROCEDURE — 2580000003 HC RX 258: Performed by: INTERNAL MEDICINE

## 2020-12-02 PROCEDURE — G8484 FLU IMMUNIZE NO ADMIN: HCPCS | Performed by: INTERNAL MEDICINE

## 2020-12-02 PROCEDURE — 6360000002 HC RX W HCPCS: Performed by: INTERNAL MEDICINE

## 2020-12-02 PROCEDURE — 82805 BLOOD GASES W/O2 SATURATION: CPT

## 2020-12-02 PROCEDURE — 83880 ASSAY OF NATRIURETIC PEPTIDE: CPT

## 2020-12-02 PROCEDURE — G8427 DOCREV CUR MEDS BY ELIG CLIN: HCPCS | Performed by: INTERNAL MEDICINE

## 2020-12-02 PROCEDURE — 80076 HEPATIC FUNCTION PANEL: CPT

## 2020-12-02 PROCEDURE — 96365 THER/PROPH/DIAG IV INF INIT: CPT

## 2020-12-02 PROCEDURE — 36430 TRANSFUSION BLD/BLD COMPNT: CPT

## 2020-12-02 PROCEDURE — 6360000002 HC RX W HCPCS: Performed by: EMERGENCY MEDICINE

## 2020-12-02 PROCEDURE — 2060000000 HC ICU INTERMEDIATE R&B

## 2020-12-02 PROCEDURE — 84443 ASSAY THYROID STIM HORMONE: CPT

## 2020-12-02 PROCEDURE — 4040F PNEUMOC VAC/ADMIN/RCVD: CPT | Performed by: INTERNAL MEDICINE

## 2020-12-02 PROCEDURE — 2580000003 HC RX 258: Performed by: EMERGENCY MEDICINE

## 2020-12-02 PROCEDURE — C9113 INJ PANTOPRAZOLE SODIUM, VIA: HCPCS | Performed by: INTERNAL MEDICINE

## 2020-12-02 PROCEDURE — G8400 PT W/DXA NO RESULTS DOC: HCPCS | Performed by: INTERNAL MEDICINE

## 2020-12-02 PROCEDURE — 86900 BLOOD TYPING SEROLOGIC ABO: CPT

## 2020-12-02 PROCEDURE — C9113 INJ PANTOPRAZOLE SODIUM, VIA: HCPCS | Performed by: EMERGENCY MEDICINE

## 2020-12-02 PROCEDURE — 80053 COMPREHEN METABOLIC PANEL: CPT

## 2020-12-02 PROCEDURE — 86923 COMPATIBILITY TEST ELECTRIC: CPT

## 2020-12-02 PROCEDURE — G8417 CALC BMI ABV UP PARAM F/U: HCPCS | Performed by: INTERNAL MEDICINE

## 2020-12-02 PROCEDURE — P9059 PLASMA, FRZ BETWEEN 8-24HOUR: HCPCS

## 2020-12-02 PROCEDURE — 71045 X-RAY EXAM CHEST 1 VIEW: CPT

## 2020-12-02 RX ORDER — 0.9 % SODIUM CHLORIDE 0.9 %
20 INTRAVENOUS SOLUTION INTRAVENOUS ONCE
Status: DISCONTINUED | OUTPATIENT
Start: 2020-12-02 | End: 2020-12-03

## 2020-12-02 RX ORDER — 0.9 % SODIUM CHLORIDE 0.9 %
250 INTRAVENOUS SOLUTION INTRAVENOUS ONCE
Status: COMPLETED | OUTPATIENT
Start: 2020-12-02 | End: 2020-12-03

## 2020-12-02 RX ORDER — PROMETHAZINE HYDROCHLORIDE 25 MG/1
12.5 TABLET ORAL EVERY 6 HOURS PRN
Status: DISCONTINUED | OUTPATIENT
Start: 2020-12-02 | End: 2020-12-03

## 2020-12-02 RX ORDER — ACETAMINOPHEN 325 MG/1
650 TABLET ORAL EVERY 6 HOURS PRN
Status: DISCONTINUED | OUTPATIENT
Start: 2020-12-02 | End: 2020-12-05 | Stop reason: HOSPADM

## 2020-12-02 RX ORDER — PANTOPRAZOLE SODIUM 40 MG/10ML
40 INJECTION, POWDER, LYOPHILIZED, FOR SOLUTION INTRAVENOUS EVERY 12 HOURS
Status: DISCONTINUED | OUTPATIENT
Start: 2020-12-02 | End: 2020-12-04

## 2020-12-02 RX ORDER — ONDANSETRON 2 MG/ML
4 INJECTION INTRAMUSCULAR; INTRAVENOUS EVERY 6 HOURS PRN
Status: DISCONTINUED | OUTPATIENT
Start: 2020-12-02 | End: 2020-12-05 | Stop reason: HOSPADM

## 2020-12-02 RX ORDER — SODIUM CHLORIDE 9 MG/ML
10 INJECTION INTRAVENOUS EVERY 12 HOURS
Status: DISCONTINUED | OUTPATIENT
Start: 2020-12-02 | End: 2020-12-04

## 2020-12-02 RX ORDER — WARFARIN SODIUM 10 MG/1
10 TABLET ORAL DAILY
Qty: 30 TABLET | Refills: 1 | Status: ON HOLD
Start: 2020-12-02 | End: 2020-12-02 | Stop reason: ALTCHOICE

## 2020-12-02 RX ORDER — ACETAMINOPHEN 650 MG/1
650 SUPPOSITORY RECTAL EVERY 6 HOURS PRN
Status: DISCONTINUED | OUTPATIENT
Start: 2020-12-02 | End: 2020-12-05 | Stop reason: HOSPADM

## 2020-12-02 RX ORDER — LEVOTHYROXINE SODIUM 0.1 MG/1
100 TABLET ORAL DAILY
Status: DISCONTINUED | OUTPATIENT
Start: 2020-12-03 | End: 2020-12-05 | Stop reason: HOSPADM

## 2020-12-02 RX ORDER — SODIUM CHLORIDE 0.9 % (FLUSH) 0.9 %
10 SYRINGE (ML) INJECTION PRN
Status: DISCONTINUED | OUTPATIENT
Start: 2020-12-02 | End: 2020-12-05 | Stop reason: HOSPADM

## 2020-12-02 RX ORDER — SODIUM CHLORIDE 0.9 % (FLUSH) 0.9 %
10 SYRINGE (ML) INJECTION EVERY 12 HOURS SCHEDULED
Status: DISCONTINUED | OUTPATIENT
Start: 2020-12-02 | End: 2020-12-05 | Stop reason: HOSPADM

## 2020-12-02 RX ADMIN — SODIUM CHLORIDE 80 MG: 9 INJECTION, SOLUTION INTRAVENOUS at 16:52

## 2020-12-02 RX ADMIN — PANTOPRAZOLE SODIUM 40 MG: 40 INJECTION, POWDER, FOR SOLUTION INTRAVENOUS at 19:05

## 2020-12-02 RX ADMIN — Medication 10 ML: at 21:45

## 2020-12-02 RX ADMIN — SODIUM CHLORIDE 250 ML: 9 INJECTION, SOLUTION INTRAVENOUS at 19:00

## 2020-12-02 ASSESSMENT — ENCOUNTER SYMPTOMS
COLOR CHANGE: 0
BACK PAIN: 0
BLOOD IN STOOL: 0
RHINORRHEA: 0
VOMITING: 0
ABDOMINAL PAIN: 0
DIARRHEA: 0
SHORTNESS OF BREATH: 1
NAUSEA: 0
COUGH: 0

## 2020-12-02 ASSESSMENT — PAIN SCALES - GENERAL
PAINLEVEL_OUTOF10: 0
PAINLEVEL_OUTOF10: 3

## 2020-12-02 ASSESSMENT — PAIN DESCRIPTION - LOCATION: LOCATION: BACK;NECK

## 2020-12-02 ASSESSMENT — PAIN DESCRIPTION - PAIN TYPE: TYPE: ACUTE PAIN

## 2020-12-02 NOTE — ED PROVIDER NOTES
ECHO COMPL W DOP COLOR FLOW  2012         TRANSESOPHAGEAL ECHOCARDIOGRAM  2020       Social History:   Social History     Socioeconomic History    Marital status:      Spouse name: None    Number of children: None    Years of education: None    Highest education level: None   Occupational History    Occupation: retired   Social Needs    Financial resource strain: None    Food insecurity     Worry: None     Inability: None    Transportation needs     Medical: None     Non-medical: None   Tobacco Use    Smoking status: Former Smoker     Packs/day: 0.10     Years: 45.00     Pack years: 4.50     Types: Cigarettes     Start date:      Last attempt to quit: 2019     Years since quittin.9    Smokeless tobacco: Never Used   Substance and Sexual Activity    Alcohol use: Yes     Comment: rare; 2-3 cups coffee daily (decaf)    Drug use: No    Sexual activity: Yes     Partners: Male   Lifestyle    Physical activity     Days per week: None     Minutes per session: None    Stress: None   Relationships    Social connections     Talks on phone: None     Gets together: None     Attends Yazidi service: None     Active member of club or organization: None     Attends meetings of clubs or organizations: None     Relationship status: None    Intimate partner violence     Fear of current or ex partner: None     Emotionally abused: None     Physically abused: None     Forced sexual activity: None   Other Topics Concern    None   Social History Narrative    None       Family History:   History reviewed. No pertinent family history. The patients home medications have been reviewed.     Allergies:   No Known Allergies        ---------------------------------------------------PHYSICAL EXAM--------------------------------------    /61   Temp 96.9 °F (36.1 °C)   Resp 18   Ht 5' 5\" (1.651 m)   Wt 185 lb (83.9 kg)   BMI 30.79 kg/m²     Physical Exam  Constitutional:       General: She is not in acute distress. Appearance: She is not toxic-appearing. HENT:      Mouth/Throat:      Mouth: Mucous membranes are moist.   Eyes:      General: No scleral icterus. Extraocular Movements: Extraocular movements intact. Pupils: Pupils are equal, round, and reactive to light. Neck:      Musculoskeletal: Normal range of motion and neck supple. Cardiovascular:      Rate and Rhythm: Regular rhythm. Tachycardia present. Pulses: Normal pulses. Heart sounds: Normal heart sounds. No murmur. Pulmonary:      Effort: Pulmonary effort is normal. No respiratory distress. Breath sounds: Normal breath sounds. No wheezing or rales. Abdominal:      General: There is no distension. Palpations: Abdomen is soft. Tenderness: There is no abdominal tenderness. Genitourinary:     Rectum: Guaiac result positive (brown stool, guaiac +). Musculoskeletal: Normal range of motion. General: Swelling (BLE pitting edema to shins) present. No tenderness. Skin:     General: Skin is warm and dry. Neurological:      Mental Status: She is alert and oriented to person, place, and time. Comments: Strength 5/5 and sensation grossly intact to light touch and equal bilaterally throughout all extremities         -------------------------------------------------- RESULTS -------------------------------------------------  I have personally reviewed all laboratory and imaging results for this patient. Results are listed below.      LABS:  Labs Reviewed   PROTIME-INR - Abnormal; Notable for the following components:       Result Value    Protime 35.1 (*)     All other components within normal limits   TROPONIN - Abnormal; Notable for the following components:    Troponin 0.09 (*)     All other components within normal limits   CBC WITH AUTO DIFFERENTIAL - Abnormal; Notable for the following components:    WBC 13.6 (*)     RBC 1.67 (*)     Hemoglobin 3.5 (*)     Hematocrit 13.8 (*) MCH 21.0 (*)     MCHC 25.4 (*)     RDW 21.0 (*)     Neutrophils % 86.0 (*)     Lymphocytes % 9.0 (*)     Neutrophils Absolute 12.10 (*)     Lymphocytes Absolute 1.22 (*)     Eosinophils Absolute 0.00 (*)     Metamyelocytes Relative 3.0 (*)     All other components within normal limits    Narrative:     CALL  [place]  H34 tel. ,  Hematology results called to and read back by [facility]-ELEONORA STACK RN, 12/02/2020  16:26, by [address] PANEL - Abnormal; Notable for the following components:    Potassium 5.2 (*)     CO2 14 (*)     Anion Gap 17 (*)     Glucose 122 (*)     BUN 43 (*)     CREATININE 1.4 (*)     Calcium 8.4 (*)     All other components within normal limits   HEPATIC FUNCTION PANEL - Abnormal; Notable for the following components: Total Protein 5.8 (*)     ALT 36 (*)     AST 50 (*)     All other components within normal limits   BRAIN NATRIURETIC PEPTIDE - Abnormal; Notable for the following components:    Pro-BNP 3,575 (*)     All other components within normal limits   BLOOD GAS, ARTERIAL - Abnormal; Notable for the following components:    PCO2 25.5 (*)     PO2 142.6 (*)     HCO3 14.5 (*)     B.E. -9.9 (*)     O2 Sat 99.3 (*)     COHb 1.6 (*)     tHb (est) 5.0 (*)     All other components within normal limits   RESPIRATORY PANEL, MOLECULAR, WITH COVID-19   ARTERIAL BLOOD GAS, RESPIRATORY ONLY   IRON AND TIBC   HEMOGLOBIN AND HEMATOCRIT, BLOOD   HEMOGLOBIN AND HEMATOCRIT, BLOOD   TYPE AND SCREEN   PREPARE RBC (CROSSMATCH)   PREPARE FRESH FROZEN PLASMA   TYPE AND SCREEN       RADIOLOGY:  Interpreted personally and by Radiologist.  XR CHEST PORTABLE   Final Result   Persistent cardiomegaly with new small right pleural effusion   Stable linear scar in both lung bases   Status post aortic and mitral valve prosthesis          EKG: This EKG is signed and interpreted by the EP.     Sinus tachycardia, 1st degree AV block, vent rate 102bpm, normal axis, inferior and lateral ST-T changes new compared w/ prior EKG      ------------------------- NURSING NOTES AND VITALS REVIEWED ---------------------------   The nursing notes within the ED encounter and vital signs as below have been reviewed by myself. /61   Temp 96.9 °F (36.1 °C)   Resp 18   Ht 5' 5\" (1.651 m)   Wt 185 lb (83.9 kg)   BMI 30.79 kg/m²   Oxygen Saturation Interpretation: Normal    The patients available past medical records and past encounters were reviewed.         ------------------------------ ED COURSE/MEDICAL DECISION MAKING----------------------  Medications   0.9 % sodium chloride bolus (250 mLs Intravenous New Bag 12/2/20 1900)   phytonadione (ADULT) (VITAMIN K) 10 mg in dextrose 5 % 100 mL IVPB (has no administration in time range)   0.9 % sodium chloride bolus (has no administration in time range)   sodium chloride flush 0.9 % injection 10 mL (has no administration in time range)   sodium chloride flush 0.9 % injection 10 mL (has no administration in time range)   acetaminophen (TYLENOL) tablet 650 mg (has no administration in time range)     Or   acetaminophen (TYLENOL) suppository 650 mg (has no administration in time range)   promethazine (PHENERGAN) tablet 12.5 mg (has no administration in time range)     Or   ondansetron (ZOFRAN) injection 4 mg (has no administration in time range)   pantoprazole (PROTONIX) injection 40 mg (40 mg Intravenous Given 12/2/20 1905)     And   sodium chloride (PF) 0.9 % injection 10 mL (has no administration in time range)   levothyroxine (SYNTHROID) tablet 100 mcg (has no administration in time range)   0.9 % sodium chloride bolus (has no administration in time range)   pantoprazole (PROTONIX) 80 mg in sodium chloride 0.9 % 50 mL bolus (0 mg Intravenous Stopped 12/2/20 1747)     Consultations:             General surgery    Critical Care: Please note that the withdrawal or failure to initiate urgent interventions for this patient would likely result in a life threatening deterioration or permanent

## 2020-12-02 NOTE — TELEPHONE ENCOUNTER
----- Message from Anna Moss DO sent at 12/2/2020 12:48 PM EST -----  Attempted to call patient to ask her to go to ED for severely low blood count. No VM set up on mobile. Left message on home phone. Please continue to attempt to contact and refer her to ED when we can for further evaluation. Anna Moss D.O.   Cardiologist  Cardiology, [address] [facility]

## 2020-12-02 NOTE — PROGRESS NOTES
CHIEF COMPLAINT: CHF/Afib/AVR-MVR    HISTORY OF PRESENT ILLNESS: Patient is a 69 y.o. female seen at the request of Amaury Hills DO. Patient feeling unwell for about a week. Very low BP. INR spiked. Baseline OCONNOR. No CP.      Past Medical History:   Diagnosis Date    H/O mitral valve replacement     Hypertension     Hypothyroidism     S/P aortic valve replacement        Patient Active Problem List   Diagnosis    Macular hole of right eye    Acquired hypothyroidism    H/O aortic valve replacement    H/O mitral valve replacement    NSTEMI (non-ST elevated myocardial infarction) (HCC)    Atrial flutter with rapid ventricular response (HCC)    Sick sinus syndrome (HCC)    Acute on chronic systolic congestive heart failure (HCC)    Cardiomyopathy (HCC)    Iron deficiency anemia       No Known Allergies    Current Outpatient Medications   Medication Sig Dispense Refill    bumetanide (BUMEX) 1 MG tablet TAKE ONE TABLET BY MOUTH DAILY AS NEEDED (WEIGHT GAIN, SWELLING, SHORTNESS OF BREATH) 30 tablet 3    digoxin (LANOXIN) 125 MCG tablet Take 1 tablet by mouth daily 90 tablet 3    acetaminophen (TYLENOL) 325 MG tablet Take 2 tablets by mouth every 6 hours as needed for Pain or Fever 120 tablet 3    levothyroxine (SYNTHROID) 100 MCG tablet Take 1 tablet by mouth Daily 30 tablet 3    busPIRone (BUSPAR) 5 MG tablet TAKE ONE TABLET BY MOUTH TWO TIMES A DAY (Patient not taking: Reported on 12/2/2020) 60 tablet 0    sacubitril-valsartan (ENTRESTO)  MG per tablet Take 0.5 tablets by mouth 2 times daily (Patient not taking: Reported on 12/2/2020) 90 tablet 3    metoprolol succinate (TOPROL XL) 50 MG extended release tablet Take 0.5 tablets by mouth daily (Patient not taking: Reported on 12/2/2020) 90 tablet 3    warfarin (COUMADIN) 10 MG tablet Take 1 tablet by mouth daily (Patient not taking: Reported on 12/2/2020) 30 tablet 1     No current facility-administered medications for this hypotension. 2. Chronic Aflutter:     Rate controlled. Warfarin held due to super-therapeutic INR. 3. History of aortic and mitral valve replacements:     Observe. 4. Elevated troponin: Stress normal perfusion 5/19/2020.    5. HTN: Observe. 6. Hypotension: Hold all medications. Labs. Recheck in 1 wk. Arnaldo Correia D.O.   Cardiologist  Cardiology, [address] [facility]

## 2020-12-03 ENCOUNTER — ANESTHESIA EVENT (OUTPATIENT)
Dept: ENDOSCOPY | Age: 70
DRG: 377 | End: 2020-12-03
Payer: MEDICARE

## 2020-12-03 ENCOUNTER — ANTI-COAG VISIT (OUTPATIENT)
Dept: FAMILY MEDICINE CLINIC | Age: 70
End: 2020-12-03

## 2020-12-03 ENCOUNTER — ANESTHESIA (OUTPATIENT)
Dept: ENDOSCOPY | Age: 70
DRG: 377 | End: 2020-12-03
Payer: MEDICARE

## 2020-12-03 VITALS — DIASTOLIC BLOOD PRESSURE: 72 MMHG | SYSTOLIC BLOOD PRESSURE: 122 MMHG | OXYGEN SATURATION: 93 %

## 2020-12-03 PROBLEM — I50.22 CHRONIC SYSTOLIC CONGESTIVE HEART FAILURE (HCC): Status: ACTIVE | Noted: 2020-05-18

## 2020-12-03 PROBLEM — I48.92 CHRONIC ATRIAL FLUTTER (HCC): Status: ACTIVE | Noted: 2020-12-03

## 2020-12-03 PROBLEM — D62 ACUTE BLOOD LOSS ANEMIA: Status: ACTIVE | Noted: 2020-12-03

## 2020-12-03 PROBLEM — I48.0 PAF (PAROXYSMAL ATRIAL FIBRILLATION) (HCC): Status: ACTIVE | Noted: 2020-12-03

## 2020-12-03 LAB
BLOOD BANK DISPENSE STATUS: NORMAL
BLOOD BANK DISPENSE STATUS: NORMAL
BLOOD BANK PRODUCT CODE: NORMAL
BLOOD BANK PRODUCT CODE: NORMAL
BPU ID: NORMAL
BPU ID: NORMAL
DESCRIPTION BLOOD BANK: NORMAL
DESCRIPTION BLOOD BANK: NORMAL
EKG ATRIAL RATE: 102 BPM
EKG P AXIS: 89 DEGREES
EKG P-R INTERVAL: 312 MS
EKG Q-T INTERVAL: 330 MS
EKG QRS DURATION: 102 MS
EKG QTC CALCULATION (BAZETT): 430 MS
EKG R AXIS: 83 DEGREES
EKG T AXIS: -97 DEGREES
EKG VENTRICULAR RATE: 102 BPM
FERRITIN: 19 NG/ML
HCT VFR BLD CALC: 20.8 % (ref 34–48)
HCT VFR BLD CALC: 22.8 % (ref 34–48)
HCT VFR BLD CALC: 27 % (ref 34–48)
HCT VFR BLD CALC: 27.1 % (ref 34–48)
HEMOGLOBIN: 6.5 G/DL (ref 11.5–15.5)
HEMOGLOBIN: 6.9 G/DL (ref 11.5–15.5)
HEMOGLOBIN: 8.3 G/DL (ref 11.5–15.5)
HEMOGLOBIN: 8.4 G/DL (ref 11.5–15.5)
IRON SATURATION: 4 % (ref 15–50)
IRON: 13 MCG/DL (ref 37–145)
TOTAL IRON BINDING CAPACITY: 354 MCG/DL (ref 250–450)
TROPONIN: 0.07 NG/ML (ref 0–0.03)
VITAMIN B-12: 1256 PG/ML (ref 211–946)

## 2020-12-03 PROCEDURE — 3700000001 HC ADD 15 MINUTES (ANESTHESIA): Performed by: SURGERY

## 2020-12-03 PROCEDURE — 82728 ASSAY OF FERRITIN: CPT

## 2020-12-03 PROCEDURE — 3609017100 HC EGD: Performed by: SURGERY

## 2020-12-03 PROCEDURE — P9016 RBC LEUKOCYTES REDUCED: HCPCS

## 2020-12-03 PROCEDURE — 7100000000 HC PACU RECOVERY - FIRST 15 MIN: Performed by: SURGERY

## 2020-12-03 PROCEDURE — 36430 TRANSFUSION BLD/BLD COMPNT: CPT

## 2020-12-03 PROCEDURE — 85018 HEMOGLOBIN: CPT

## 2020-12-03 PROCEDURE — 6360000002 HC RX W HCPCS: Performed by: NURSE ANESTHETIST, CERTIFIED REGISTERED

## 2020-12-03 PROCEDURE — 6370000000 HC RX 637 (ALT 250 FOR IP): Performed by: STUDENT IN AN ORGANIZED HEALTH CARE EDUCATION/TRAINING PROGRAM

## 2020-12-03 PROCEDURE — 85014 HEMATOCRIT: CPT

## 2020-12-03 PROCEDURE — 83540 ASSAY OF IRON: CPT

## 2020-12-03 PROCEDURE — 2580000003 HC RX 258: Performed by: NURSE ANESTHETIST, CERTIFIED REGISTERED

## 2020-12-03 PROCEDURE — 84484 ASSAY OF TROPONIN QUANT: CPT

## 2020-12-03 PROCEDURE — 2060000000 HC ICU INTERMEDIATE R&B

## 2020-12-03 PROCEDURE — C9113 INJ PANTOPRAZOLE SODIUM, VIA: HCPCS | Performed by: STUDENT IN AN ORGANIZED HEALTH CARE EDUCATION/TRAINING PROGRAM

## 2020-12-03 PROCEDURE — P9059 PLASMA, FRZ BETWEEN 8-24HOUR: HCPCS

## 2020-12-03 PROCEDURE — 2580000003 HC RX 258: Performed by: SURGERY

## 2020-12-03 PROCEDURE — 82607 VITAMIN B-12: CPT

## 2020-12-03 PROCEDURE — 6360000002 HC RX W HCPCS: Performed by: SURGERY

## 2020-12-03 PROCEDURE — 7100000001 HC PACU RECOVERY - ADDTL 15 MIN: Performed by: SURGERY

## 2020-12-03 PROCEDURE — 6370000000 HC RX 637 (ALT 250 FOR IP): Performed by: SURGERY

## 2020-12-03 PROCEDURE — 6360000002 HC RX W HCPCS: Performed by: PHYSICIAN ASSISTANT

## 2020-12-03 PROCEDURE — 43235 EGD DIAGNOSTIC BRUSH WASH: CPT | Performed by: SURGERY

## 2020-12-03 PROCEDURE — 0DJ08ZZ INSPECTION OF UPPER INTESTINAL TRACT, VIA NATURAL OR ARTIFICIAL OPENING ENDOSCOPIC: ICD-10-PCS | Performed by: SURGERY

## 2020-12-03 PROCEDURE — 99222 1ST HOSP IP/OBS MODERATE 55: CPT | Performed by: INTERNAL MEDICINE

## 2020-12-03 PROCEDURE — 2580000003 HC RX 258: Performed by: STUDENT IN AN ORGANIZED HEALTH CARE EDUCATION/TRAINING PROGRAM

## 2020-12-03 PROCEDURE — 6370000000 HC RX 637 (ALT 250 FOR IP): Performed by: INTERNAL MEDICINE

## 2020-12-03 PROCEDURE — C9113 INJ PANTOPRAZOLE SODIUM, VIA: HCPCS | Performed by: SURGERY

## 2020-12-03 PROCEDURE — 93010 ELECTROCARDIOGRAM REPORT: CPT | Performed by: INTERNAL MEDICINE

## 2020-12-03 PROCEDURE — 6360000002 HC RX W HCPCS: Performed by: STUDENT IN AN ORGANIZED HEALTH CARE EDUCATION/TRAINING PROGRAM

## 2020-12-03 PROCEDURE — 3700000000 HC ANESTHESIA ATTENDED CARE: Performed by: SURGERY

## 2020-12-03 PROCEDURE — 36415 COLL VENOUS BLD VENIPUNCTURE: CPT

## 2020-12-03 PROCEDURE — 83550 IRON BINDING TEST: CPT

## 2020-12-03 PROCEDURE — APPSS60 APP SPLIT SHARED TIME 46-60 MINUTES: Performed by: PHYSICIAN ASSISTANT

## 2020-12-03 PROCEDURE — 2709999900 HC NON-CHARGEABLE SUPPLY: Performed by: SURGERY

## 2020-12-03 RX ORDER — HYDROCODONE BITARTRATE AND ACETAMINOPHEN 5; 325 MG/1; MG/1
1 TABLET ORAL PRN
Status: DISCONTINUED | OUTPATIENT
Start: 2020-12-03 | End: 2020-12-03 | Stop reason: HOSPADM

## 2020-12-03 RX ORDER — LIDOCAINE HYDROCHLORIDE 20 MG/ML
INJECTION, SOLUTION INTRAVENOUS PRN
Status: DISCONTINUED | OUTPATIENT
Start: 2020-12-03 | End: 2020-12-03 | Stop reason: SDUPTHER

## 2020-12-03 RX ORDER — SODIUM CHLORIDE 9 MG/ML
INJECTION, SOLUTION INTRAVENOUS CONTINUOUS PRN
Status: DISCONTINUED | OUTPATIENT
Start: 2020-12-03 | End: 2020-12-03 | Stop reason: SDUPTHER

## 2020-12-03 RX ORDER — METOPROLOL SUCCINATE 25 MG/1
25 TABLET, EXTENDED RELEASE ORAL DAILY
Status: DISCONTINUED | OUTPATIENT
Start: 2020-12-03 | End: 2020-12-05 | Stop reason: HOSPADM

## 2020-12-03 RX ORDER — PROPOFOL 10 MG/ML
INJECTION, EMULSION INTRAVENOUS PRN
Status: DISCONTINUED | OUTPATIENT
Start: 2020-12-03 | End: 2020-12-03 | Stop reason: SDUPTHER

## 2020-12-03 RX ORDER — HYDROCODONE BITARTRATE AND ACETAMINOPHEN 5; 325 MG/1; MG/1
2 TABLET ORAL PRN
Status: DISCONTINUED | OUTPATIENT
Start: 2020-12-03 | End: 2020-12-03 | Stop reason: HOSPADM

## 2020-12-03 RX ORDER — 0.9 % SODIUM CHLORIDE 0.9 %
20 INTRAVENOUS SOLUTION INTRAVENOUS ONCE
Status: COMPLETED | OUTPATIENT
Start: 2020-12-03 | End: 2020-12-03

## 2020-12-03 RX ORDER — MEPERIDINE HYDROCHLORIDE 25 MG/ML
12.5 INJECTION INTRAMUSCULAR; INTRAVENOUS; SUBCUTANEOUS EVERY 5 MIN PRN
Status: DISCONTINUED | OUTPATIENT
Start: 2020-12-03 | End: 2020-12-03 | Stop reason: HOSPADM

## 2020-12-03 RX ORDER — PROMETHAZINE HYDROCHLORIDE 25 MG/ML
6.25 INJECTION, SOLUTION INTRAMUSCULAR; INTRAVENOUS EVERY 10 MIN PRN
Status: DISCONTINUED | OUTPATIENT
Start: 2020-12-03 | End: 2020-12-03 | Stop reason: HOSPADM

## 2020-12-03 RX ORDER — MORPHINE SULFATE 2 MG/ML
1 INJECTION, SOLUTION INTRAMUSCULAR; INTRAVENOUS EVERY 5 MIN PRN
Status: DISCONTINUED | OUTPATIENT
Start: 2020-12-03 | End: 2020-12-03 | Stop reason: HOSPADM

## 2020-12-03 RX ORDER — MORPHINE SULFATE 2 MG/ML
2 INJECTION, SOLUTION INTRAMUSCULAR; INTRAVENOUS EVERY 5 MIN PRN
Status: DISCONTINUED | OUTPATIENT
Start: 2020-12-03 | End: 2020-12-03 | Stop reason: HOSPADM

## 2020-12-03 RX ORDER — DIGOXIN 0.25 MG/ML
125 INJECTION INTRAMUSCULAR; INTRAVENOUS DAILY
Status: DISCONTINUED | OUTPATIENT
Start: 2020-12-03 | End: 2020-12-05 | Stop reason: HOSPADM

## 2020-12-03 RX ORDER — SUCRALFATE 1 G/1
1 TABLET ORAL EVERY 6 HOURS SCHEDULED
Status: DISCONTINUED | OUTPATIENT
Start: 2020-12-04 | End: 2020-12-05 | Stop reason: HOSPADM

## 2020-12-03 RX ORDER — FUROSEMIDE 10 MG/ML
20 INJECTION INTRAMUSCULAR; INTRAVENOUS ONCE
Status: COMPLETED | OUTPATIENT
Start: 2020-12-03 | End: 2020-12-03

## 2020-12-03 RX ADMIN — ACETAMINOPHEN 650 MG: 325 TABLET ORAL at 11:44

## 2020-12-03 RX ADMIN — SODIUM CHLORIDE, PRESERVATIVE FREE 10 ML: 5 INJECTION INTRAVENOUS at 17:25

## 2020-12-03 RX ADMIN — POLYETHYLENE GLYCOL 3350, SODIUM SULFATE ANHYDROUS, SODIUM BICARBONATE, SODIUM CHLORIDE, POTASSIUM CHLORIDE 4000 ML: 236; 22.74; 6.74; 5.86; 2.97 POWDER, FOR SOLUTION ORAL at 14:57

## 2020-12-03 RX ADMIN — FUROSEMIDE 20 MG: 10 INJECTION, SOLUTION INTRAMUSCULAR; INTRAVENOUS at 14:49

## 2020-12-03 RX ADMIN — SODIUM CHLORIDE: 9 INJECTION, SOLUTION INTRAVENOUS at 08:22

## 2020-12-03 RX ADMIN — PROPOFOL 60 MG: 10 INJECTION, EMULSION INTRAVENOUS at 08:47

## 2020-12-03 RX ADMIN — Medication 10 ML: at 19:56

## 2020-12-03 RX ADMIN — LIDOCAINE HYDROCHLORIDE 40 MG: 20 INJECTION, SOLUTION INTRAVENOUS at 08:47

## 2020-12-03 RX ADMIN — SODIUM CHLORIDE, PRESERVATIVE FREE 10 ML: 5 INJECTION INTRAVENOUS at 06:00

## 2020-12-03 RX ADMIN — SUCRALFATE 1 G: 1 TABLET ORAL at 23:34

## 2020-12-03 RX ADMIN — PANTOPRAZOLE SODIUM 40 MG: 40 INJECTION, POWDER, FOR SOLUTION INTRAVENOUS at 19:56

## 2020-12-03 RX ADMIN — DIGOXIN 125 MCG: 0.25 INJECTION INTRAMUSCULAR; INTRAVENOUS at 17:25

## 2020-12-03 RX ADMIN — SODIUM CHLORIDE, PRESERVATIVE FREE 10 ML: 5 INJECTION INTRAVENOUS at 19:56

## 2020-12-03 RX ADMIN — PANTOPRAZOLE SODIUM 40 MG: 40 INJECTION, POWDER, FOR SOLUTION INTRAVENOUS at 05:59

## 2020-12-03 RX ADMIN — SODIUM CHLORIDE 20 ML: 9 INJECTION, SOLUTION INTRAVENOUS at 06:50

## 2020-12-03 ASSESSMENT — PULMONARY FUNCTION TESTS
PIF_VALUE: 0

## 2020-12-03 ASSESSMENT — PAIN SCALES - GENERAL
PAINLEVEL_OUTOF10: 0
PAINLEVEL_OUTOF10: 7

## 2020-12-03 NOTE — PROGRESS NOTES
Have not yet called pt, she is currently admitted into hospital for upper GI bleed. Let me know if you have changes to what you put for her warfarin. Unsure if I can get a hold of her?

## 2020-12-03 NOTE — CONSULTS
Inpatient Cardiology Consultation      Reason for Consult: Elevated troponin    Consulting Physician: Dr. Harlen Goldberg    Requesting Physician: Dr. Holden Rivera    Date of Consultation: 12/3/2020    HISTORY OF PRESENT ILLNESS:   Patient is a 34-year-old white female who follows with Dr. Darin Beaver outpatient. Patient is being seen in consultation is hospital admission by Dr. Harlen Goldberg for evaluation and recommendations regarding elevated troponin. Of note, patient was seen by Dr. Darin Beaver outpatient on 12/2/2020 -- was noted to be hypotensive and with supratherapeutic INR. All blood pressure medications were held, and lab work was obtained. Patient was noted to be severely anemic, and was advised to go to the ED for further evaluation. Patient has a known past medical history of hypertension, hypothyroidism on replacement therapy, valvular heart disease/history of rheumatic fever as a child status-post mechanical aortic and mitral valve replacements on chronic Coumadin therapy, chronic heart failure with reduced ejection fraction, non-ischemic cardiomyopathy, former tobacco abuse, persistent atrial flutter/fibrillation on chronic Coumadin therapy, obesity, anxiety and history of medical non-compliance. Patient denies any prior history of bleeding issues in the past.  She states her INR is typically well controlled and therapeutic. Patient presented to [facility] on December 2, 2020 due to severe anemia noted on outpatient labs. Patient states over the last week, she has noticed progressively worsening generalized weakness and fatigue. She also notes of intermittent shortness of breath more so at rest.  She appears mildly short of breath during interview today, and is now requiring nasal cannula. She denies wearing supplemental oxygen at home. She denies any complaints of chest discomfort, nausea, vomiting, hematemesis, palpitations, dizziness, near-syncope or syncope. She does admit to headache and chronic left lower extremity edema. HFrEF.  6. Former tobacco abuse. 7. Rheumatic fever as a child. 8. History of edical non-compliance. 9. Anxiety. 10. Obesity.         CARDIAC TESTING:     TTE (1/25/2012)   Summary    Left ventricular size is grossly normal.    Normal left ventricular wall thickness.    Ejection fraction is visually estimated at 35%.    No evidence of left ventricular mass or thrombus noted.    No regional wall motion abnormalities seen.    Overall ejection fraction moderate-to-severely decreased .    The left atrium is moderately dilated.    Interatrial septum appears intact.    No evidence of thrombus within left atrium.    Borderline dilated right ventricle.    No evidence of a thrombus in the right ventricle.    Mildly enlarged right atrium size.    No evidence of thrombus or mass in the right atrium.    Mitral valve replacement (mechanical)    Normal pressure gradient    Mechanical valve replacement    Normal pressure gradient    Mild tricuspid regurgitation.    The tricuspid valve appears structurally normal.    RVSP is 30.8 mmHg.    Pulmonary hypertension is mild .    The pulmonic valve was not well visualized.    Physiologic and/or trace pulmonic regurgitation present.     NM Stress (5/2020)  FINDINGS:  Perfusion images demonstrate no reversible perfusion defect. Wall motion is within normal limits. The end diastolic volume is 0:40 ml. The end systolic volume is 67 ml. The estimated ejection fraction is 48 %. Impression:  1. No reversible perfusion defect  2. Ejection fraction is 40 %.   3. No significant wall motion abnormality     TTE (5/15/2020, Dr. Radhames Richardson)   Summary   Ejection fraction is visually estimated at 25%.   Overall ejection fraction severely decreased.   Normal right ventricle structure and function.   Left atrial volume index of 118 ml per meters squared BSA.   Mechanical prosthetic valve in aortic position.   The aortic valve a maximum gradient of 15 mmHg and a mean gradient of 9 mmHg.   Physiologic and/or trace aortic regurgitation is noted.   Mechanical prosthetic valve is present.   Mean transmitral gradient 6.2 mmHg.   Physiologic and/or trace mitral regurgitation is present.   Moderate and severe tricuspid regurgitation.   Pulmonary hypertension is moderate. RVSP is 56 mmHg.     FRANTZ (5/18/2020)   Summary   Technically suboptimal and limited study.   Left ventricle is grossly normal in size .   Severely reduced left ventricular systolic function.   Severely dilated left atrium.   There was evidence moderate of spontaneous echo contrast in the left  atrium.   Mechanical mitral valve replacement.   Mild mitral regurgitation is present.   Suboptimally visualized mechanical prosthetic valve in aortic position. PAST SURGICAL HISTORY:    Past Surgical History:   Procedure Laterality Date    CARDIAC VALVE REPLACEMENT      twice-1. MV 20 years ago 2. AV 1.5 years after (05/28/20)    CARDIOVERSION  05/18/2020    ECHO COMPL W DOP COLOR FLOW  1/25/2012         TRANSESOPHAGEAL ECHOCARDIOGRAM  05/18/2020       HOME MEDICATIONS:  Prior to Admission medications    Medication Sig Start Date End Date Taking?  Authorizing Provider   levothyroxine (SYNTHROID) 100 MCG tablet Take 1 tablet by mouth Daily 5/18/20  Yes Guillermina Pagan,        CURRENT MEDICATIONS:      Current Facility-Administered Medications:     0.9 % sodium chloride bolus, 20 mL, Intravenous, Once, Shelly Hi MD, Last Rate: 10 mL/hr at 12/03/20 0650, 20 mL at 12/03/20 0650    phytonadione (ADULT) (VITAMIN K) 10 mg in dextrose 5 % 100 mL IVPB, 10 mg, Intravenous, Once, Bradford Coleman MD    0.9 % sodium chloride bolus, 20 mL, Intravenous, Once, Bradford Coleman MD    sodium chloride flush 0.9 % injection 10 mL, 10 mL, Intravenous, 2 times per day, Lico Quigley MD, 10 mL at 12/02/20 2145    sodium chloride flush 0.9 % injection 10 mL, 10 mL, Intravenous, PRN, Lico Quigley MD    acetaminophen (TYLENOL) tablet 650 mg, 650 mg, Oral, Q6H PRN **OR** acetaminophen (TYLENOL) suppository 650 mg, 650 mg, Rectal, Q6H PRN, Mindy Jackson MD    promethazine (PHENERGAN) tablet 12.5 mg, 12.5 mg, Oral, Q6H PRN **OR** ondansetron (ZOFRAN) injection 4 mg, 4 mg, Intravenous, Q6H PRN, Mindy Jackson MD    pantoprazole (PROTONIX) injection 40 mg, 40 mg, Intravenous, Q12H, 40 mg at 12/03/20 0559 **AND** sodium chloride (PF) 0.9 % injection 10 mL, 10 mL, Intravenous, Q12H, Jackline Fleischer, MD, 10 mL at 12/03/20 0600    levothyroxine (SYNTHROID) tablet 100 mcg, 100 mcg, Oral, Daily, Mindy Jackson MD    0.9 % sodium chloride bolus, 20 mL, Intravenous, Once, Talia Granados MD    0.9 % sodium chloride bolus, 20 mL, Intravenous, Once, Jackline Fleischer, MD      ALLERGIES:  Patient has no known allergies. SOCIAL HISTORY:    She is a former tobacco smoker, states she quit sometime in 2019. Smoked one pack/day for 1-2+ years. Denies former or current illicit drug or alcohol use. FAMILY HISTORY:   She denies any pertinent cardiac family medical history at this time. REVIEW OF SYSTEMS:     · Constitutional: +generalized weakness/fatigue. Denies fevers, chills, night sweats. Denies significant weight loss or weight gain. · HEENT: +HA. Denies nose bleeds, rhinorrhea, sore throat. Denies blurred vision. Denies dysphagia, odynophagia. · Musculoskeletal: Denies falls, pain to BLE with ambulation. · Neurological: Denies dizziness and lightheadedness, numbness and tingling. Denies focal neurological deficits. · Cardiovascular: +questionable orthopnea. Denies chest pain, palpitations, diaphoresis. Denies syncope. · Respiratory: +shortness of breath at rest. Denies cough, hemoptysis. · Gastrointestinal: +dark stools. Denies abdominal pain, nausea/vomiting, diarrhea and constipation. · Genitourinary: Denies dysuria and hematuria. · Hematologic: Denies excessive bruising or bleeding. · Endocrine: Denies excessive thirst. Denies intolerance to hot and cold.       PHYSICAL EXAM: /61   Pulse 86   Temp 97.5 °F (36.4 °C) (Temporal)   Resp 16   Ht 5' 5\" (1.651 m)   Wt 185 lb (83.9 kg)   SpO2 100%   BMI 30.79 kg/m²   CONST:  Well developed, obese WF who appears stated age. Awake, alert, cooperative. Mildly short of breath with conversation. Pale. HEENT:   Head- Normocephalic, atraumatic. Neck-  No stridor, trachea midline, no apparent jugular venous distention. CHEST: Chest symmetrical and non-tender to palpation. No accessory muscle use or intercostal retractions. RESPIRATORY: Lung sounds - coarse with wheezing bilaterally. CARDIOVASCULAR:     Heart Inspection- shows no noted pulsations. Heart Palpation- no heaves or thrills. Heart Ausculation- Irregularly irregular rhythm and rate, crisp mechanical valve click throughout, soft systolic murmur LLSB. PV: Nonpitting LLE edema. No varicosities. Pedal pulses palpable, no clubbing or cyanosis. ABDOMEN: Soft, non-tender to light palpation. Bowel sounds present. MS: Good muscle strength and tone. No atrophy or abnormal movements. SKIN: Warm and dry. NEURO / PSYCH: Oriented to person, place and time. Speech clear and appropriate. Follows all commands. Pleasant affect. DATA:    Telemetry: Atrial fibrillation with HR in the 80s - low 100s    Diagnostic:  All diagnostic testing and lab work thus far this admission reviewed in detail. CXR 12/2/2020:   Impression:  Persistent cardiomegaly with new small right pleural effusion   Stable linear scar in both lung bases   Status post aortic and mitral valve prosthesis         Intake/Output Summary (Last 24 hours) at 12/3/2020 0824  Last data filed at 12/3/2020 0300  Gross per 24 hour   Intake 1428.33 ml   Output --   Net 1428.33 ml       Labs:   CBC:   Recent Labs     12/02/20  1155 12/02/20  1554 12/03/20  0125 12/03/20  0511   WBC 12.8* 13.6*  --   --    HGB 3.5* 3.5* 6.9* 6.5*   HCT 13.5* 13.8* 22.8* 20.8*    295  --   --      BMP:   Recent Labs 12/02/20  1155 12/02/20  1554    135   K 5.3* 5.2*   CO2 14* 14*   BUN 41* 43*   CREATININE 1.4* 1.4*   LABGLOM 37 37   CALCIUM 8.6 8.4*     TSH:   Recent Labs     12/02/20  1155   TSH 2.890     HgA1c:   Lab Results   Component Value Date    LABA1C 5.6 05/15/2020     PT/INR:   Recent Labs     12/02/20 12/02/20  1522   PROTIME  --  35.1*   INR 3.10 3.0     CARDIAC ENZYMES:  Recent Labs     12/02/20  1522   TROPONINI 0.09*     FASTING LIPID PANEL:  Lab Results   Component Value Date    CHOL 140 05/15/2020    HDL 46 05/15/2020    LDLCALC 80 05/15/2020    TRIG 71 05/15/2020     LIVER PROFILE:  Recent Labs     12/02/20  1155 12/02/20  1554   AST 45* 50*   ALT 31 36*   LABALBU 3.7 3.8         ASSESSMENT:  1. Elevated troponin, in setting of severe acute anemia and acute kidney injury. No complaints of chest discomfort. 2. Severe acute anemia with hemoglobin of 3.5 g on admission. Given vitamin K in the ED. Now s/p three units of pRBCs with Hgb of 6.5 g.  EGD revealed no evidence of bleeding -- for colonoscopy tomorrow. 3. Acute hypoxic respiratory failure. Viral panel and COVID-19 testing negative. 4. Acute on chronic heart failure with reduced ejection fraction. Non-ischemic cardiomyopathy. 5. Acute kidney injury. Hyperkalemia. 6. Persistent atrial fibrillation/flutter, on chronic Coumadin therapy. 7. Valvular heart disease status-post mechanical aortic and mechanical mitral valve replacements, on chronic Coumadin therapy. History of rheumatic fever as a child. 8. Hypertension. 5. Former tobacco abuse. 10. Obesity. RECOMMENDATIONS:  1. Trend troponin. 2. One dose of IV Lasix 20 mg now. Will reassess later today for need for further diuresis given blood transfusions and IV fluid administration. 3. Strict intake and output. Daily weights. 4. Will order BNP and chest x-ray for tomorrow AM.   5. Resume Toprol-XL 25 mg daily and digoxin as per Dr. Cynthia Costello.   6. Will resume Entresto once JOSEPH has resolved and blood pressure is more stable. 7. Resume Coumadin when able/okay with others with goal INR 3.0-3.5 in setting of mechanical aortic and mitral valves. 8. We will continue to follow. The above case and recommendations were made in collaboration with Dr. Ede Barney -- further recommendations to follow as per him . [address], [address] [facility], [place] [address] Cardiology    Electronically signed by Luis Felipe PA-C on 12/3/2020 at 8:24 AM     Reason for consult: Elevated troponin. Patient seen with   Luis Felipe PA-C. Agree with the findings and A/P. Management plan was discussed. I have personally interviewed the patient, independently performed a focused cardiac exam, reviewed the pertinent laboratory and diagnostic testing results and directly participated in the medical decision-making. HPI: 65-year-old female who was seen in consultation due to elevated troponin. She has history of hypertension, hypothyroidism, rheumatic fever, status post mechanical aortic and mitral valve replacement approximately 20 years ago and has been on Coumadin, persistent atrial fibrillation and chronic CHF with decreased ejection fraction with an ejection fraction of 25%. Patient was seen by Dr. Cathleen Dowd yesterday in his office due to fatigue and shortness of breath at rest for approximately a week. Test obtained revealed hemoglobin 3.5. Patient received 3 units of blood transfusions. She underwent an EGD which revealed no active bleeding. She denies chest discomfort. Her shortness of breath has improved. Reviewed the PMH, social history, FH and ROS from APRN note. Agree with the findings. See the full consult note for details. PE:   BP 90/62   Pulse 93   Temp 98.4 °F (36.9 °C) (Temporal)   Resp 18   Ht 5' 5\" (1.651 m)   Wt 185 lb (83.9 kg)   SpO2 95%   BMI 30.79 kg/m²   CONST: Middle-age female who appears of stated age. Awake, alert, cooperative, no apparent distress.    HEENT: Head- normocephalic, atraumatic. Neck: no jugular venous distention. No carotid bruit noted. CHEST: Symmetrical and non-tender to palpation. No noted accessory muscle use or intercostal retractions. LUNGS: Air entry with no wheezing or crackles. CARDIOVASCULAR: Irregular but not tachycardic with mechanical click present, no murmur, s3, s4 or rub noted. PV: Trivial left lower extremity edema. Pedal pulses palpable. ABDOMEN: Soft, non-tender to light palpation. Bowel sounds present. No palpable masses no hepatosplenomegaly or splenomegaly; no abdominal bruit / pulsation  SKIN: Warm and dry. Heddy  NEURO / PSYCH: Oriented to person, place and time. Speech clear and appropriate. Follows all commands. Pleasant affect. EKG as per my interpretation: Atrial fibrillation with controlled ventricular response at 91 bpm, low voltage, left axis deviation, poor R wave progression, prolonged QTC at 555 ms and artifacts.     CXR:   Persistent cardiomegaly with new small right pleural effusion    Stable linear scar in both lung bases    Status post aortic and mitral valve prosthesis          Lab Review     Recent Labs     12/02/20  1155 12/02/20  1554 12/03/20  0125 12/03/20  0511 12/03/20  1208   WBC 12.8* 13.6*  --   --   --    HGB 3.5* 3.5* 6.9* 6.5* 8.4*   HCT 13.5* 13.8* 22.8* 20.8* 27.0*    295  --   --   --        Recent Labs     12/02/20  1155 12/02/20  1554    135   K 5.3* 5.2*    104   CO2 14* 14*   BUN 41* 43*   CREATININE 1.4* 1.4*       Recent Labs     12/02/20  1554   AST 50*   ALT 36*   BILIDIR 0.2   ALKPHOS 93         Last 3 Troponin:    Lab Results   Component Value Date    TROPONINI 0.07 12/03/2020    TROPONINI 0.09 12/02/2020    TROPONINI 0.05 06/19/2020        Recent Labs     12/02/20 12/02/20  1522   INR 3.10 3.0       Recent Labs     12/02/20  1554   PROBNP 3,575*       TTE (5/15/2020, Dr. Cathryn Rodríguez)   Summary   Ejection fraction is visually estimated at 25%.   Overall ejection fraction severely decreased.   Normal right ventricle structure and function.   Left atrial volume index of 118 ml per meters squared BSA.   Mechanical prosthetic valve in aortic position.   The aortic valve a maximum gradient of 15 mmHg and a mean gradient of 9 mmHg.   Physiologic and/or trace aortic regurgitation is noted.   Mechanical prosthetic valve is present.   Mean transmitral gradient 6.2 mmHg.   Physiologic and/or trace mitral regurgitation is present.   Moderate and severe tricuspid regurgitation.   Pulmonary hypertension is moderate. RVSP is 56 mmHg. Chest x-ray:  Persistent cardiomegaly with new small right pleural effusion    Stable linear scar in both lung bases    Status post aortic and mitral valve prosthesis            Assessment:  1. Elevated troponin, in setting of severe acute anemia and acute kidney injury. No complaints of chest discomfort. 2. Severe acute anemia with hemoglobin of 3.5 g on admission. Given vitamin K in the ED. Now s/p three units of pRBCs with Hgb of 6.5 g.  EGD revealed no evidence of bleeding -- for colonoscopy tomorrow. 3. Acute hypoxic respiratory failure. Probably due to severe anemia. 4. Acute on chronic heart failure with reduced ejection fraction. Non-ischemic cardiomyopathy. 5. Acute kidney injury and hyperkalemia. 6. Persistent atrial fibrillation/flutter, with controlled ventricular response on chronic Coumadin therapy. 7. Valvular heart disease status-post mechanical aortic and mechanical mitral valve replacements, on chronic Coumadin therapy. 8. Hypertension. 5. Former tobacco abuse. Plan:  1. Strict intake and output. Daily weights. 2. Will order BNP and chest x-ray for tomorrow AM.   3. Resume Toprol-XL 25 mg daily and digoxin to keep the ventricular response under control. 4. Will resume Entresto once JOSEPH has resolved and blood pressure is more stable.   5. Resume Coumadin when able/okay with others with goal INR 3.0-3.5 in setting of mechanical aortic and mitral valves      Thank you for the consult. Will follow. Electronically signed by Lay Luna MD on 12/3/2020 at 7:56 PM  [facility] Cardiology.

## 2020-12-03 NOTE — PLAN OF CARE
Problem: Pain:  Goal: Pain level will decrease  Description: Pain level will decrease  12/3/2020 0120 by Dalia Montana RN  Outcome: Met This Shift  12/3/2020 0120 by Dalia Montana RN  Outcome: Met This Shift  Goal: Control of acute pain  Description: Control of acute pain  12/3/2020 0120 by Dalia Montana RN  Outcome: Met This Shift  12/3/2020 0120 by Dalia Montana RN  Outcome: Met This Shift  Goal: Control of chronic pain  Description: Control of chronic pain  12/3/2020 0120 by Dalia Montana RN  Outcome: Met This Shift  12/3/2020 0120 by Dalia Montana RN  Outcome: Met This Shift     Problem: Skin Integrity:  Goal: Will show no infection signs and symptoms  Description: Will show no infection signs and symptoms  12/3/2020 0120 by Dalia Montana RN  Outcome: Met This Shift  12/3/2020 0120 by Dalia Montana RN  Outcome: Met This Shift  Goal: Absence of new skin breakdown  Description: Absence of new skin breakdown  12/3/2020 0120 by Dalia Montana RN  Outcome: Met This Shift  12/3/2020 0120 by Dalia Montana RN  Outcome: Met This Shift

## 2020-12-03 NOTE — PROGRESS NOTES
Dr. Mariluz Greenwood notified of patient hemoglobin at 6.9   Awaiting further orders as Dr. Katerina Mahoney did not respond

## 2020-12-03 NOTE — OP NOTE
[address] [facility]  ENDOSCOPY LAB  UPPER ENDOSCOPY REPORT      DATE OF PROCEDURE: 12/3/2020     PREOPERATIVE DIAGNOSIS: Gi bleed    POSTOPERATIVE DIAGNOSIS/FINDINGS: duodenitis, duodenal ulcer--no stigmata of recent bleed, 3cm hiatal hernia    SURGEON: Mikaela Valdovinos MD    ASSISTANT: none    OPERATION: Esophagogastroduodenoscopy    ANESTHESIA: Local monitored anesthesia. COMPLICATIONS: None. EBL:  none    SPECIMENS:  none    PRIOR TO EXAM: Gen: comfortable, no distress, awake and alert; Lungs: Clear;  Heart:regular rate and rhythm, normal S1S2     BRIEF HISTORY:  This is a 69 y.o. female who presents with the complaint of anemia, GI bleed. My recommendation is to proceed with esophagogastroduodenoscopy. The patient was advised of the risks, benefits, complications and options including the risk of bleeding and perforation. The patient understood and agreed to proceed. PROCEDURE:  Under [facility] anesthesia, the patient was positioned in the left side down decubitus position. A bite block was inserted. Under direct visualization the scope was passed through the oral cavity, into the esophagus and then into the stomach. The scope was then passed  into the duodenum.  Findings are as follows:    Duodenum: duodenitis, active of D1/2; duodenal ulcer no stigmata of recent bleed    Antrum/pylorus:  normal    Gastric body: normal    Gastric fundus/cardia: 3cm hiatal hernia    Esophagus: normal    GEJ:  3cm hiatal hernia; @40cm    THE PATIENT TOLERATED THE PROCEDURE WELL      PLAN:  PPI  Carafate  Plan for colonoscopy tomorrow      Mikaela Valdovinos MD  12/3/2020  8:53 AM

## 2020-12-03 NOTE — PROGRESS NOTES
Patient presented with treatment consent for EGD tomorrow and she stated that she wanted to talk to the doctor before signing anything.  Unsigned treatment consent placed in patient soft chart

## 2020-12-03 NOTE — CONSULTS
Years since quittin.9    Smokeless tobacco: Never Used   Substance Use Topics    Alcohol use: Yes     Comment: rare; 2-3 cups coffee daily (decaf)    Drug use: No         Review of Systems   General ROS: negative  Hematological and Lymphatic ROS: negative  Respiratory ROS: negative  Cardiovascular ROS: negative  Gastrointestinal ROS: As above  Genito-Urinary ROS: negative  Musculoskeletal ROS: negative      PHYSICAL EXAM:    Vitals:    20 1850   BP: (!) 140/73   Pulse: 102   Resp: 24   Temp:    SpO2: 100%       General Appearance:  awake, alert, oriented, in no acute distress  Skin:  Skin color, texture, turgor normal. No rashes or lesions. Head/face:  NCAT  Eyes:  No gross abnormalities. Lungs: On 2L NC  Heart:  Tachycardic and normotensive  Abdomen:  Soft, non-tender, normal bowel sounds. No bruits, organomegaly or masses. Extremities: Extremities warm to touch, pink, with no edema. Female Rectal: No masses or lesions or hemorrhoids. Brown stool on glove, FOBT+    LABS:    CBC  Recent Labs     20  1554   WBC 13.6*   HGB 3.5*   HCT 13.8*        BMP  Recent Labs     20  1554      K 5.2*      CO2 14*   BUN 43*   CREATININE 1.4*   CALCIUM 8.4*     Liver Function  Recent Labs     20  1554   BILITOT 0.8   BILIDIR 0.2   AST 50*   ALT 36*   ALKPHOS 93   PROT 5.8*   LABALBU 3.8     No results for input(s): LACTATE in the last 72 hours. Recent Labs     20  1522   INR 3.0       RADIOLOGY    Xr Chest Portable    Result Date: 2020  EXAMINATION: ONE XRAY VIEW OF THE CHEST 2020 4:06 pm COMPARISON: 2020 HISTORY: ORDERING SYSTEM PROVIDED HISTORY: short of breath TECHNOLOGIST PROVIDED HISTORY: History of aortic and mitral valve replacement. Reason for exam:->short of breath What reading provider will be dictating this exam?->CRC FINDINGS: Sternotomy cerclage wires are again noted. Prosthetic aortic and mitral valve again noted.  Cardiac silhouette size remains enlarged. No definite vascular congestion. New small right pleural effusion blunts the right CP angle. No left pleural effusion. No pneumothorax or acute displaced fracture. Stable linear scar in both lung bases. No new pulmonary consolidation or infiltrate.     Persistent cardiomegaly with new small right pleural effusion Stable linear scar in both lung bases Status post aortic and mitral valve prosthesis        ASSESSMENT:  69 y.o. female with anemia and FOBT+ stool, likely due to upper GI bleed    PLAN:  - Admit to medicine  - Needs monitored floor vs ICU for close watch of her Hgb  - Keep NPO as patient may need intervention tonight vs tomorrow  - Ensure adequate resuscitation with IVFs and blood products  - PPI BID  - Hold anticoagulation  - Reverse INR  - Plan for EGD tomorrow 12/3, will perform sooner if patient decompensates  - Monitor H/H, transfuse per primary  - Discussed with Dr. Rl Buitrago    Electronically signed by Marilyn Stephenson MD on 12/2/20 at 7:03 PM EST

## 2020-12-03 NOTE — ANESTHESIA PRE PROCEDURE
Department of Anesthesiology  Preprocedure Note       Name:  Hilario Atkinson   Age:  69 y.o.  :  1950                                          MRN:  [id number]         Date:  12/3/2020      Surgeon: Cris Rivera):  Keli Cotto MD    Procedure: Procedure(s):  EGD DIAGNOSTIC ONLY    Medications prior to admission:   Prior to Admission medications    Medication Sig Start Date End Date Taking?  Authorizing Provider   levothyroxine (SYNTHROID) 100 MCG tablet Take 1 tablet by mouth Daily 20  Yes Estela Pagan DO       Current medications:    Current Facility-Administered Medications   Medication Dose Route Frequency Provider Last Rate Last Dose    0.9 % sodium chloride bolus  20 mL Intravenous Once Clay Quick MD 10 mL/hr at 20 0650 20 mL at 20 0650    phytonadione (ADULT) (VITAMIN K) 10 mg in dextrose 5 % 100 mL IVPB  10 mg Intravenous Once Rustam Whipple MD        0.9 % sodium chloride bolus  20 mL Intravenous Once Rustam Whipple MD        sodium chloride flush 0.9 % injection 10 mL  10 mL Intravenous 2 times per day Mariluz Tyler MD   10 mL at 20 2145    sodium chloride flush 0.9 % injection 10 mL  10 mL Intravenous PRN Mariluz Tyler MD        acetaminophen (TYLENOL) tablet 650 mg  650 mg Oral Q6H PRN Mariluz Tyler MD        Or   Heriberto Kevin acetaminophen (TYLENOL) suppository 650 mg  650 mg Rectal Q6H PRN Mariluz Tyler MD        promethazine (PHENERGAN) tablet 12.5 mg  12.5 mg Oral Q6H PRN Mariluz Tyler MD        Or    ondansetron (ZOFRAN) injection 4 mg  4 mg Intravenous Q6H PRN Mariluz Tyler MD        pantoprazole (PROTONIX) injection 40 mg  40 mg Intravenous Q12H Clay Quick MD   40 mg at 20 0559    And    sodium chloride (PF) 0.9 % injection 10 mL  10 mL Intravenous Q12H Clay Quick MD   10 mL at 20 0600    levothyroxine (SYNTHROID) tablet 100 mcg  100 mcg Oral Daily Mariluz Tyler MD        0.9 % sodium chloride bolus  20 mL Intravenous Once MD Heriberto Xavier 0.9 % sodium chloride bolus  20 mL Intravenous Once Matt Santos MD           Allergies:  No Known Allergies    Problem List:    Patient Active Problem List   Diagnosis Code    Macular hole of right eye H35.341    Acquired hypothyroidism E03.9    H/O aortic valve replacement Z95.2    H/O mitral valve replacement Z95.2    NSTEMI (non-ST elevated myocardial infarction) ([facility] [address] I21.4    Atrial flutter with rapid ventricular response (HCC) I48.92    Sick sinus syndrome (HCC) I49.5    Chronic systolic congestive heart failure ([address] I50.22    Cardiomyopathy ([facility] [address] I42.9    Iron deficiency anemia D50.9    UGIB (upper gastrointestinal bleed) K92.2    Chronic atrial flutter (HCC) I48.92    Acute blood loss anemia D62       Past Medical History:        Diagnosis Date    H/O mitral valve replacement     Hypertension     Hypothyroidism     S/P aortic valve replacement        Past Surgical History:        Procedure Laterality Date    CARDIAC VALVE REPLACEMENT      twice-1. MV 20 years ago 2.  AV 1.5 years after (20)    CARDIOVERSION  2020    ECHO COMPL W DOP COLOR FLOW  2012         TRANSESOPHAGEAL ECHOCARDIOGRAM  2020       Social History:    Social History     Tobacco Use    Smoking status: Former Smoker     Packs/day: 0.10     Years: 45.00     Pack years: 4.50     Types: Cigarettes     Start date:      Last attempt to quit: 2019     Years since quittin.9    Smokeless tobacco: Never Used   Substance Use Topics    Alcohol use: Yes     Comment: rare; 2-3 cups coffee daily (decaf)                                Counseling given: Not Answered      Vital Signs (Current):   Vitals:    20 0202 20 0223 20 0650 20 0711   BP: 133/63 119/74 (!) 108/56 111/61   Pulse: 100 105 95 86   Resp: 16 18 18 16   Temp: 36.3 °C (97.3 °F) 36.3 °C (97.3 °F) 36.3 °C (97.3 °F) 36.4 °C (97.5 °F)   TempSrc: Temporal Temporal Temporal Temporal   SpO2: 95% 99% 100% 100%   Weight: Height:                                                  BP Readings from Last 3 Encounters:   12/03/20 111/61   12/02/20 (!) 72/48   08/26/20 110/60       NPO Status:                                                                                 BMI:   Wt Readings from Last 3 Encounters:   12/02/20 185 lb (83.9 kg)   12/02/20 158 lb 12.8 oz (72 kg)   08/27/20 156 lb (70.8 kg)     Body mass index is 30.79 kg/m². CBC:   Lab Results   Component Value Date    WBC 13.6 12/02/2020    RBC 1.67 12/02/2020    HGB 6.5 12/03/2020    HCT 20.8 12/03/2020    MCV 82.6 12/02/2020    RDW 21.0 12/02/2020     12/02/2020       CMP:   Lab Results   Component Value Date     12/02/2020    K 5.2 12/02/2020    K 3.9 06/19/2020     12/02/2020    CO2 14 12/02/2020    BUN 43 12/02/2020    CREATININE 1.4 12/02/2020    GFRAA 45 12/02/2020    LABGLOM 37 12/02/2020    GLUCOSE 122 12/02/2020    GLUCOSE 93 01/25/2012    PROT 5.8 12/02/2020    CALCIUM 8.4 12/02/2020    BILITOT 0.8 12/02/2020    ALKPHOS 93 12/02/2020    AST 50 12/02/2020    ALT 36 12/02/2020       POC Tests: No results for input(s): POCGLU, POCNA, POCK, POCCL, POCBUN, POCHEMO, POCHCT in the last 72 hours.     Coags:   Lab Results   Component Value Date    PROTIME 35.1 12/02/2020    PROTIME 20.5 07/08/2020    INR 3.0 12/02/2020    APTT 32.4 05/20/2020       HCG (If Applicable): No results found for: PREGTESTUR, PREGSERUM, HCG, HCGQUANT     ABGs: No results found for: PHART, PO2ART, THO6YLD, PQH4VWP, BEART, U9MAUQZJ     Type & Screen (If Applicable):  No results found for: LABABO, LABRH    Drug/Infectious Status (If Applicable):  No results found for: HIV, HEPCAB    COVID-19 Screening (If Applicable):   Lab Results   Component Value Date    COVID19 Not Detected 12/02/2020         Anesthesia Evaluation  Patient summary reviewed and Nursing notes reviewed no history of anesthetic complications:   Airway: Mallampati: II  TM distance: <3 FB   Neck ROM: full  Mouth opening: > = 3 FB Dental:    (+) upper dentures      Pulmonary:Negative Pulmonary ROS and normal exam  breath sounds clear to auscultation      Smoker: former smoker 4.5 pack years. Cardiovascular:    (+) hypertension (h/o mitral valve replacement, s/p aortic valve replacement): mild, past MI:, CHF (a flutter w/rvr):,         Rhythm: regular  Rate: normal                    Neuro/Psych:   Negative Neuro/Psych ROS              GI/Hepatic/Renal:            ROS comment: Iron deficiency anemia, ugib, acute blood loss anemia. Endo/Other:    (+) hypothyroidism::., .                 Abdominal:           Vascular:                                      Anesthesia Plan      MAC     ASA 3       Induction: intravenous. Anesthetic plan and risks discussed with patient. Plan discussed with attending. GLADYS Paulino CRNA   12/3/2020      Pt seen, examined, chart reviewed, plan discussed.   Peng Bajwa  12/3/2020  8:53 AM

## 2020-12-03 NOTE — PROGRESS NOTES
[facility] did not fax result until after office closed yesterday despite it resulting it at 1:17 PM.

## 2020-12-03 NOTE — H&P
[address] [facility]  History and Physical      CHIEF COMPLAINT: Anemia      HISTORY OF PRESENT ILLNESS:      The patient is a 69 y.o. female patient of Dr. Jeni Pillai history of AVR and MVR, hypertension, hypothyroid who presents with anemia. Patient was seen by her PCP and found to be anemic. Reported hemoglobin of 3.5. Patient notes 1 week of shortness of breath social with weakness and fatigue. Also reports lightheadedness and decreased appetite. Patient is on chronic warfarin. She denies any black bloody or tarry stools. No abdominal pain or NSAID use. States she uses only Tylenol. Patient was transfused 3 units PRBC in ED and admitted for further evaluation treatment. Past Medical History:    Past Medical History:   Diagnosis Date    H/O mitral valve replacement     Hypertension     Hypothyroidism     S/P aortic valve replacement        Past Surgical History:    Past Surgical History:   Procedure Laterality Date    CARDIAC VALVE REPLACEMENT      twice-1. MV 20 years ago 2. AV 1.5 years after (05/28/20)    CARDIOVERSION  05/18/2020    ECHO COMPL W DOP COLOR FLOW  1/25/2012         TRANSESOPHAGEAL ECHOCARDIOGRAM  05/18/2020       Medications Prior to Admission:    Medications Prior to Admission: levothyroxine (SYNTHROID) 100 MCG tablet, Take 1 tablet by mouth Daily    Allergies:    Patient has no known allergies. Social History:    reports that she quit smoking about 23 months ago. Her smoking use included cigarettes. She started smoking about 46 years ago. She has a 4.50 pack-year smoking history. She has never used smokeless tobacco. She reports current alcohol use. She reports that she does not use drugs. Family History:   family history is not on file.     REVIEW OF SYSTEMS:  As above in the HPI, otherwise negative    PHYSICAL EXAM:    Vitals:  /61   Pulse 86   Temp 97.5 °F (36.4 °C) (Temporal)   Resp 16   Ht 5' 5\" (1.651 m)   Wt 185 lb (83.9 kg)   SpO2 100% BMI 30.79 kg/m²     General:  Awake, alert, oriented X 3. Well developed, well nourished, well groomed. No apparent distress. HEENT:  Normocephalic, atraumatic. Pupils equal, round, reactive to light. No scleral icterus. No conjunctival injection. Normal lips, teeth, and gums. No nasal discharge. Neck:  Supple  Heart:  RRR, no murmurs, gallops, rubs  Lungs:  CTA bilaterally, bilat symmetrical expansion, no wheeze, rales, or rhonchi  Abdomen:   Bowel sounds present, soft, nontender, no masses, no organomegaly, no peritoneal signs  Extremities:  No clubbing, cyanosis, or edema  Skin:  Warm and dry, no open lesions or rash  Neuro:  Cranial nerves 2-12 intact, no focal deficits  Breast: deferred  Rectal: deferred  Genitalia:  deferred    LABS:    CBC with Differential:    Lab Results   Component Value Date    WBC 13.6 12/02/2020    RBC 1.67 12/02/2020    HGB 6.5 12/03/2020    HCT 20.8 12/03/2020     12/02/2020    MCV 82.6 12/02/2020    MCH 21.0 12/02/2020    MCHC 25.4 12/02/2020    RDW 21.0 12/02/2020    SEGSPCT 81 01/25/2012    METASPCT 3.0 12/02/2020    LYMPHOPCT 9.0 12/02/2020    MONOPCT 2.0 12/02/2020    BASOPCT 0.0 12/02/2020    MONOSABS 0.27 12/02/2020    LYMPHSABS 1.22 12/02/2020    EOSABS 0.00 12/02/2020    BASOSABS 0.00 12/02/2020     CMP:    Lab Results   Component Value Date     12/02/2020    K 5.2 12/02/2020    K 3.9 06/19/2020     12/02/2020    CO2 14 12/02/2020    BUN 43 12/02/2020    CREATININE 1.4 12/02/2020    GFRAA 45 12/02/2020    LABGLOM 37 12/02/2020    GLUCOSE 122 12/02/2020    GLUCOSE 93 01/25/2012    PROT 5.8 12/02/2020    LABALBU 3.8 12/02/2020    LABALBU 4.4 01/25/2012    CALCIUM 8.4 12/02/2020    BILITOT 0.8 12/02/2020    ALKPHOS 93 12/02/2020    AST 50 12/02/2020    ALT 36 12/02/2020     Magnesium:    Lab Results   Component Value Date    MG 2.5 05/20/2020     Phosphorus:    Lab Results   Component Value Date    PHOS 3.6 05/15/2020     PT/INR:    Lab Results Component Value Date    PROTIME 35.1 12/02/2020    PROTIME 20.5 07/08/2020    INR 3.0 12/02/2020     Last 3 Troponin:    Lab Results   Component Value Date    TROPONINI 0.09 12/02/2020    TROPONINI 0.05 06/19/2020    TROPONINI 0.05 06/19/2020     U/A:  No results found for: NITRITE, COLORU, PROTEINU, PHUR, LABCAST, WBCUA, RBCUA, MUCUS, TRICHOMONAS, YEAST, BACTERIA, CLARITYU, SPECGRAV, LEUKOCYTESUR, UROBILINOGEN, BILIRUBINUR, BLOODU, GLUCOSEU, AMORPHOUS  ABG:    Lab Results   Component Value Date    PH 7.374 12/02/2020    PCO2 25.5 12/02/2020    PO2 142.6 12/02/2020    HCO3 14.5 12/02/2020    BE -9.9 12/02/2020    O2SAT 99.3 12/02/2020     HgBA1c:    Lab Results   Component Value Date    LABA1C 5.6 05/15/2020     FLP:    Lab Results   Component Value Date    TRIG 71 05/15/2020    HDL 46 05/15/2020    [address] 05/15/2020    LABVLDL 14 05/15/2020     TSH:    Lab Results   Component Value Date    TSH 2.890 12/02/2020       XR CHEST PORTABLE   Final Result   Persistent cardiomegaly with new small right pleural effusion   Stable linear scar in both lung bases   Status post aortic and mitral valve prosthesis          ASSESSMENT:      Active Problems:    Chronic systolic congestive heart failure (HCC)    UGIB (upper gastrointestinal bleed)    Chronic atrial flutter (HCC)    Acute blood loss anemia  Resolved Problems:    * No resolved hospital problems.  *      PLAN:    65-year-old female history of chronic systolic CHF, a flutter on Coumadin admitted with anemia and suspected GI bleeding status post EGD showing duodenitis and duodenal ulcer without evidence of active bleeding    INR reversed with vitamin K and FFP  Transfused 3 units PRBC in ED  NPO  Monitor H&H  Transfuse PRN maintain hemoglobin >= 7  IV PPI  Gen surgical Consult appreciated   Cardiology consult appreciated  Medications for other co morbidities cont as appropriate w dosage adjustments as necessary  DVT PPx SCD  DC planning          Electronically signed by Destini Andres MD on 12/3/2020 at 7:39 AM

## 2020-12-04 ENCOUNTER — ANESTHESIA (OUTPATIENT)
Dept: ENDOSCOPY | Age: 70
DRG: 377 | End: 2020-12-04
Payer: MEDICARE

## 2020-12-04 ENCOUNTER — APPOINTMENT (OUTPATIENT)
Dept: GENERAL RADIOLOGY | Age: 70
DRG: 377 | End: 2020-12-04
Payer: MEDICARE

## 2020-12-04 VITALS
RESPIRATION RATE: 51 BRPM | SYSTOLIC BLOOD PRESSURE: 86 MMHG | DIASTOLIC BLOOD PRESSURE: 53 MMHG | OXYGEN SATURATION: 79 %

## 2020-12-04 PROBLEM — K29.80 DUODENITIS: Status: ACTIVE | Noted: 2020-12-04

## 2020-12-04 PROBLEM — T45.515A WARFARIN-INDUCED COAGULOPATHY (HCC): Status: ACTIVE | Noted: 2020-12-04

## 2020-12-04 PROBLEM — K26.9 DUODENAL ULCER: Status: ACTIVE | Noted: 2020-12-04

## 2020-12-04 PROBLEM — N17.9 AKI (ACUTE KIDNEY INJURY) (HCC): Status: ACTIVE | Noted: 2020-12-04

## 2020-12-04 PROBLEM — D68.32 WARFARIN-INDUCED COAGULOPATHY (HCC): Status: ACTIVE | Noted: 2020-12-04

## 2020-12-04 LAB
ANION GAP SERPL CALCULATED.3IONS-SCNC: 13 MMOL/L (ref 7–16)
BUN BLDV-MCNC: 29 MG/DL (ref 8–23)
CALCIUM SERPL-MCNC: 8.4 MG/DL (ref 8.6–10.2)
CHLORIDE BLD-SCNC: 100 MMOL/L (ref 98–107)
CO2: 18 MMOL/L (ref 22–29)
CREAT SERPL-MCNC: 0.9 MG/DL (ref 0.5–1)
GFR AFRICAN AMERICAN: >60
GFR NON-AFRICAN AMERICAN: >60 ML/MIN/1.73
GLUCOSE BLD-MCNC: 82 MG/DL (ref 74–99)
HCT VFR BLD CALC: 25.2 % (ref 34–48)
HCT VFR BLD CALC: 25.6 % (ref 34–48)
HCT VFR BLD CALC: 26.7 % (ref 34–48)
HEMOGLOBIN: 7.7 G/DL (ref 11.5–15.5)
HEMOGLOBIN: 8 G/DL (ref 11.5–15.5)
HEMOGLOBIN: 8 G/DL (ref 11.5–15.5)
POTASSIUM SERPL-SCNC: 4.1 MMOL/L (ref 3.5–5)
PRO-BNP: 6965 PG/ML (ref 0–125)
SODIUM BLD-SCNC: 131 MMOL/L (ref 132–146)

## 2020-12-04 PROCEDURE — 2580000003 HC RX 258: Performed by: SURGERY

## 2020-12-04 PROCEDURE — 85018 HEMOGLOBIN: CPT

## 2020-12-04 PROCEDURE — 6360000002 HC RX W HCPCS: Performed by: SURGERY

## 2020-12-04 PROCEDURE — 6370000000 HC RX 637 (ALT 250 FOR IP): Performed by: SURGERY

## 2020-12-04 PROCEDURE — 7100000001 HC PACU RECOVERY - ADDTL 15 MIN: Performed by: SURGERY

## 2020-12-04 PROCEDURE — 7100000000 HC PACU RECOVERY - FIRST 15 MIN: Performed by: SURGERY

## 2020-12-04 PROCEDURE — 80048 BASIC METABOLIC PNL TOTAL CA: CPT

## 2020-12-04 PROCEDURE — 83880 ASSAY OF NATRIURETIC PEPTIDE: CPT

## 2020-12-04 PROCEDURE — 85014 HEMATOCRIT: CPT

## 2020-12-04 PROCEDURE — 36415 COLL VENOUS BLD VENIPUNCTURE: CPT

## 2020-12-04 PROCEDURE — 45385 COLONOSCOPY W/LESION REMOVAL: CPT | Performed by: SURGERY

## 2020-12-04 PROCEDURE — 6360000002 HC RX W HCPCS

## 2020-12-04 PROCEDURE — 88305 TISSUE EXAM BY PATHOLOGIST: CPT

## 2020-12-04 PROCEDURE — 3700000000 HC ANESTHESIA ATTENDED CARE: Performed by: SURGERY

## 2020-12-04 PROCEDURE — APPSS45 APP SPLIT SHARED TIME 31-45 MINUTES: Performed by: PHYSICIAN ASSISTANT

## 2020-12-04 PROCEDURE — 71046 X-RAY EXAM CHEST 2 VIEWS: CPT

## 2020-12-04 PROCEDURE — 2060000000 HC ICU INTERMEDIATE R&B

## 2020-12-04 PROCEDURE — 2580000003 HC RX 258

## 2020-12-04 PROCEDURE — C9113 INJ PANTOPRAZOLE SODIUM, VIA: HCPCS | Performed by: SURGERY

## 2020-12-04 PROCEDURE — 3700000001 HC ADD 15 MINUTES (ANESTHESIA): Performed by: SURGERY

## 2020-12-04 PROCEDURE — 6370000000 HC RX 637 (ALT 250 FOR IP): Performed by: STUDENT IN AN ORGANIZED HEALTH CARE EDUCATION/TRAINING PROGRAM

## 2020-12-04 PROCEDURE — 3609009900 HC COLONOSCOPY W/CONTROL BLEEDING ANY METHOD: Performed by: SURGERY

## 2020-12-04 PROCEDURE — 0DBL8ZZ EXCISION OF TRANSVERSE COLON, VIA NATURAL OR ARTIFICIAL OPENING ENDOSCOPIC: ICD-10-PCS | Performed by: SURGERY

## 2020-12-04 PROCEDURE — 0DBP8ZZ EXCISION OF RECTUM, VIA NATURAL OR ARTIFICIAL OPENING ENDOSCOPIC: ICD-10-PCS | Performed by: SURGERY

## 2020-12-04 PROCEDURE — 3609010600 HC COLONOSCOPY POLYPECTOMY SNARE/COLD BIOPSY: Performed by: SURGERY

## 2020-12-04 PROCEDURE — 0DBM8ZZ EXCISION OF DESCENDING COLON, VIA NATURAL OR ARTIFICIAL OPENING ENDOSCOPIC: ICD-10-PCS | Performed by: SURGERY

## 2020-12-04 PROCEDURE — 2709999900 HC NON-CHARGEABLE SUPPLY: Performed by: SURGERY

## 2020-12-04 RX ORDER — PANTOPRAZOLE SODIUM 40 MG/1
40 TABLET, DELAYED RELEASE ORAL
Status: DISCONTINUED | OUTPATIENT
Start: 2020-12-04 | End: 2020-12-05 | Stop reason: HOSPADM

## 2020-12-04 RX ORDER — PROPOFOL 10 MG/ML
INJECTION, EMULSION INTRAVENOUS PRN
Status: DISCONTINUED | OUTPATIENT
Start: 2020-12-04 | End: 2020-12-04 | Stop reason: SDUPTHER

## 2020-12-04 RX ORDER — SODIUM CHLORIDE 9 MG/ML
INJECTION, SOLUTION INTRAVENOUS CONTINUOUS PRN
Status: DISCONTINUED | OUTPATIENT
Start: 2020-12-04 | End: 2020-12-04 | Stop reason: SDUPTHER

## 2020-12-04 RX ADMIN — PANTOPRAZOLE SODIUM 40 MG: 40 TABLET, DELAYED RELEASE ORAL at 17:46

## 2020-12-04 RX ADMIN — LEVOTHYROXINE SODIUM 100 MCG: 0.1 TABLET ORAL at 06:30

## 2020-12-04 RX ADMIN — SODIUM CHLORIDE: 9 INJECTION, SOLUTION INTRAVENOUS at 07:14

## 2020-12-04 RX ADMIN — SUCRALFATE 1 G: 1 TABLET ORAL at 06:30

## 2020-12-04 RX ADMIN — PROPOFOL 300 MG: 10 INJECTION, EMULSION INTRAVENOUS at 07:19

## 2020-12-04 RX ADMIN — SUCRALFATE 1 G: 1 TABLET ORAL at 17:46

## 2020-12-04 RX ADMIN — SODIUM CHLORIDE: 9 INJECTION, SOLUTION INTRAVENOUS at 08:04

## 2020-12-04 RX ADMIN — Medication 10 ML: at 11:50

## 2020-12-04 RX ADMIN — SODIUM CHLORIDE, PRESERVATIVE FREE 10 ML: 5 INJECTION INTRAVENOUS at 06:30

## 2020-12-04 RX ADMIN — SODIUM CHLORIDE, PRESERVATIVE FREE 10 ML: 5 INJECTION INTRAVENOUS at 06:34

## 2020-12-04 RX ADMIN — Medication 10 ML: at 22:08

## 2020-12-04 RX ADMIN — SUCRALFATE 1 G: 1 TABLET ORAL at 11:48

## 2020-12-04 RX ADMIN — DIGOXIN 125 MCG: 0.25 INJECTION INTRAMUSCULAR; INTRAVENOUS at 11:48

## 2020-12-04 RX ADMIN — PANTOPRAZOLE SODIUM 40 MG: 40 INJECTION, POWDER, FOR SOLUTION INTRAVENOUS at 06:30

## 2020-12-04 RX ADMIN — METOPROLOL SUCCINATE 25 MG: 25 TABLET, EXTENDED RELEASE ORAL at 11:48

## 2020-12-04 ASSESSMENT — PAIN SCALES - GENERAL
PAINLEVEL_OUTOF10: 0

## 2020-12-04 NOTE — OP NOTE
[facility]  PROCEDURE NOTE    DATE OF PROCEDURE: 12/4/2020    SURGEON: Mikaela Valdovinos MD    ASSISTANT: None    PREOPERATIVE DIAGNOSIS: Diagnostic colonoscopy for GI bleed    POSTOPERATIVE DIAGNOSIS: 2 polyps at distal transverse colon; 1 polyp @55cm left colon; 1 polyp (largest) in proximal rectum; Grade 3 internal hemorrhoids; external skin tags    OPERATION: Total colonoscopy with cold snare polypectomy; hot snare to control bleeding from rectal polyp    ANESTHESIA: Local monitored anesthesia. ESTIMATED BLOOD LOSS (ml): 36CI    COMPLICATIONS: Bleeding--controlled with hot snare    SPECIMENS:  Was Obtained: 4 polyps total    HISTORY: The patient is a 69y.o. year old female with history of above preop diagnosis. I recommended colonoscopy with possible biopsy or polypectomy and I explained the risk, benefits, expected outcome, and alternatives to the procedure. Risks included but are not limited to bleeding, infection, respiratory distress, hypotension, and perforation of the colon. The patient understands and is in agreement. PROCEDURE: The patient was given IV conscious sedation per anesthesia. The patient was given supplemental oxygen by nasal cannula. The colonoscope was inserted per rectum and advanced under direct vision to the cecum without difficulty. The prep was excellent so exam was adequate. FINDINGS:  Cecum/Ascending colon: normal    Transverse colon: 2 small sessile polyps in the distal transverse colon removed with cold snare    Descending/Sigmoid colon: 1 small sessile polyp removed with cold snare @55cm    Rectum/Anus: examined in normal and retroflexed positions and was with large 1cm pedunculated polyp removed with cold snare piecemeal.  Control of bleeding with hot snare; Grade 3 internal hemorrhoids and external skin tags    The colon was decompressed and the scope was removed. The withdraw time was approximately 15 minutes.   The patient tolerated the procedure well. ASSESSMENT/PLAN:   1. Multiple polyps--f/u pathology  2.  Recommend follow up colonoscopy in 5 years     Electronically signed by Shay Harris MD on 12/4/20 at 7:59 AM EST

## 2020-12-04 NOTE — CARE COORDINATION
Social Work Discharge Planning  SW met with patient explained transition of care. Patient lives with significant other in a one story home. PTA patient independent with no dme. Patient has no hx with C or PAGE. Patient's PCP is Micha Liz and she uses   [facility] [address]. Patient's significant other will transport the patient home at discharge. Patient denies any needs at this time. SW will follow.   Electronically signed by MONICA Ames on 12/4/2020 at 12:53 PM

## 2020-12-04 NOTE — PROGRESS NOTES
Inpatient Cardiology Progress Note    Date of Service: 12/4/2020    Reason for Follow-up: Elevated troponin    SUBJECTIVE:     Patient seen and examined at bedside this afternoon. Looks much better today, denies any current chest pain, SOB, palpitations, dizziness. States she feels much better today, notes of improved generalized weakness and fatigue. Results of colonoscopy have been reviewed. Hemoglobin remaining stable today. No new cardiac events noted overnight. HOME MEDICATIONS:  Prior to Admission medications    Medication Sig Start Date End Date Taking?  Authorizing Provider   levothyroxine (SYNTHROID) 100 MCG tablet Take 1 tablet by mouth Daily 5/18/20  Yes Tez Pagan,        CURRENT MEDICATIONS:      Current Facility-Administered Medications:     pantoprazole (PROTONIX) tablet 40 mg, 40 mg, Oral, BID AC, Leonel Fitzgerald MD    metoprolol succinate (TOPROL XL) extended release tablet 25 mg, 25 mg, Oral, Daily, Leonel Fitzgerald MD, 25 mg at 12/04/20 1148    digoxin (LANOXIN) injection 125 mcg, 125 mcg, Intravenous, Daily, Leonel Fitzgerald MD, 125 mcg at 12/04/20 1148    sucralfate (CARAFATE) tablet 1 g, 1 g, Oral, 4 times per day, Leonel Fitzgerald MD, 1 g at 12/04/20 1148    sodium chloride flush 0.9 % injection 10 mL, 10 mL, Intravenous, 2 times per day, Leonel Fitzgerald MD, 10 mL at 12/04/20 1150    sodium chloride flush 0.9 % injection 10 mL, 10 mL, Intravenous, PRN, Leonel Fitzgerald MD, 10 mL at 12/04/20 0630    acetaminophen (TYLENOL) tablet 650 mg, 650 mg, Oral, Q6H PRN, 650 mg at 12/03/20 1144 **OR** acetaminophen (TYLENOL) suppository 650 mg, 650 mg, Rectal, Q6H PRN, Leonel Fitzgerald MD    [DISCONTINUED] promethazine (PHENERGAN) tablet 12.5 mg, 12.5 mg, Oral, Q6H PRN **OR** ondansetron (ZOFRAN) injection 4 mg, 4 mg, Intravenous, Q6H PRN, Leonel Fitzgerald MD    levothyroxine (SYNTHROID) tablet 100 mcg, 100 mcg, Oral, Daily, Leonel Fitzgerald MD, 100 mcg at 12/04/20 0630      ALLERGIES:  Patient has no known allergies. REVIEW OF SYSTEMS:     · Constitutional: +generalized weakness/fatigue -- improved. Denies fevers, chills, night sweats. Denies significant weight loss or weight gain. · Neurological: Denies dizziness and lightheadedness, numbness and tingling. Denies focal neurological deficits. · Cardiovascular: Denies chest pain, palpitations, diaphoresis. Denies syncope. Denies orthopnea, PND. · Respiratory: Denies shortness of breath at rest or on exertion. Denies cough, hemoptysis. · Gastrointestinal: Denies abdominal pain, nausea/vomiting, diarrhea and constipation. · Hematologic: Denies excessive bruising or bleeding. PHYSICAL EXAM:   /67   Pulse 112   Temp 97.1 °F (36.2 °C) (Temporal)   Resp 20   Ht 5' 5\" (1.651 m)   Wt 185 lb (83.9 kg)   SpO2 93%   BMI 30.79 kg/m²   CONST:  Well developed, obese WF who appears stated age. Awake, alert, cooperative. Pale. HEENT:   Head- Normocephalic, atraumatic. Neck-  No stridor, trachea midline, no apparent jugular venous distention. CHEST: Chest symmetrical and non-tender to palpation. No accessory muscle use or intercostal retractions. RESPIRATORY: Lung sounds - clear to auscultation bilaterally. CARDIOVASCULAR:     Heart Inspection- shows no noted pulsations. Heart Palpation- no heaves or thrills. Heart Ausculation- Irregularly irregular rhythm and rate, crisp mechanical valve click throughout, soft systolic murmur LLSB. PV: Nonpitting LLE edema. No varicosities. Pedal pulses palpable, no clubbing or cyanosis. ABDOMEN: Soft, non-tender to light palpation. Bowel sounds present. MS: Good muscle strength and tone. No atrophy or abnormal movements. SKIN: Warm and dry. NEURO / PSYCH: Oriented to person, place and time. Speech clear and appropriate. Follows all commands. Pleasant affect.       DATA:    Telemetry: Atrial fibrillation with HR in the 80s - 90s    Diagnostic:  All diagnostic testing and lab work thus far this admission reviewed in detail. CXR 12/2/2020: Impression:  Persistent cardiomegaly with new small right pleural effusion   Stable linear scar in both lung bases   Status post aortic and mitral valve prosthesis. CXR 12/3/2020: Impression:  No new pulmonary infiltrate or consolidation   Stable moderate cardiomegaly and small right pleural effusion   Stable linear scar lateral left lung base         Intake/Output Summary (Last 24 hours) at 12/4/2020 1247  Last data filed at 12/4/2020 0804  Gross per 24 hour   Intake 390 ml   Output --   Net 390 ml       Labs:   CBC:   Recent Labs     12/02/20  1155 12/02/20  1554  12/04/20  0032 12/04/20  0609   WBC 12.8* 13.6*  --   --   --    HGB 3.5* 3.5*   < > 8.0* 7.7*   HCT 13.5* 13.8*   < > 25.2* 26.7*    295  --   --   --     < > = values in this interval not displayed. BMP:   Recent Labs     12/02/20  1554 12/04/20  0609    131*   K 5.2* 4.1   CO2 14* 18*   BUN 43* 29*   CREATININE 1.4* 0.9   LABGLOM 37 >60   CALCIUM 8.4* 8.4*     TSH:   Recent Labs     12/02/20  1155   TSH 2.890     HgA1c:   Lab Results   Component Value Date    LABA1C 5.6 05/15/2020     PT/INR:   Recent Labs     12/02/20 12/02/20  1522   PROTIME  --  35.1*   INR 3.10 3.0     CARDIAC ENZYMES:  Recent Labs     12/02/20  1522 12/03/20  1208   TROPONINI 0.09* 0.07*     FASTING LIPID PANEL:  Lab Results   Component Value Date    CHOL 140 05/15/2020    HDL 46 05/15/2020    LDLCALC 80 05/15/2020    TRIG 71 05/15/2020     LIVER PROFILE:  Recent Labs     12/02/20  1155 12/02/20  1554   AST 45* 50*   ALT 31 36*   LABALBU 3.7 3.8         ASSESSMENT:  1. Elevated troponin, in setting of severe acute anemia and acute kidney injury. No complaints of chest discomfort. 2. Severe acute anemia with hemoglobin of 3.5 g on admission. Given vitamin K in the ED.  Now s/p three units of pRBCs with Hgb of 7.7 g.  EGD and colonoscopy revealed no evidence of bleeding -- for colonoscopy tomorrow. 3. Acute hypoxic respiratory failure, improved. Viral panel and COVID-19 testing negative. 4. Acute on chronic heart failure with reduced ejection fraction, improved. Non-ischemic cardiomyopathy. 5. Acute kidney injury. Hyperkalemia. Both resolved today. 6. Persistent atrial fibrillation/flutter, on chronic Coumadin therapy. 7. Valvular heart disease status-post mechanical aortic and mechanical mitral valve replacements, on chronic Coumadin therapy. History of rheumatic fever as a child. 8. Hypertension. Borderline hypotension this admission. 5. Former tobacco abuse. 10. Obesity.         RECOMMENDATIONS:  1. Clinically improved with IV Lasix yesterday. No longer requiring supplemental oxygen. 2. Continue to monitor volume status. 3. Resume Coumadin as soon as patient is cleared by general surgery in setting of mechanical aortic and mitral valves. Goal INR of 2.5 to 3.0 is acceptable given recent bleed and anemia as per Dr. Tamara Lainez. 4. Will hold off on resuming Entresto due to borderline blood pressure  -- may need low dose ARB until blood pressure can tolerate re-initation Entresto. 5. Continue other cardiac medications the same at this time. 6. Will sign-off. May call if needed. The above case and recommendations were made in collaboration with Dr. Tamara Lainez -- further recommendations as per him.      Amrita Brady, [address] [place] [address] Cardiology    Electronically signed by Fan Cohen PA-C on 12/4/2020 at 12:47 PM

## 2020-12-04 NOTE — ANESTHESIA PRE PROCEDURE
Department of Anesthesiology  Preprocedure Note       Name:  Anival Montoya   Age:  69 y.o.  :  1950                                          MRN:  [id number]         Date:  2020      Surgeon: Shellie Hoskins):  Art Roman MD    Procedure: Procedure(s):  COLONOSCOPY DIAGNOSTIC    Medications prior to admission:   Prior to Admission medications    Medication Sig Start Date End Date Taking? Authorizing Provider   levothyroxine (SYNTHROID) 100 MCG tablet Take 1 tablet by mouth Daily 20   Shira Pagan DO       Current medications:    No current facility-administered medications for this visit. No current outpatient medications on file.      Facility-Administered Medications Ordered in Other Visits   Medication Dose Route Frequency Provider Last Rate Last Dose    metoprolol succinate (TOPROL XL) extended release tablet 25 mg  25 mg Oral Daily Felix Degroot MD   Stopped at 20 1715    digoxin (LANOXIN) injection 125 mcg  125 mcg Intravenous Daily [facility], PA-C   125 mcg at 20 1725    sucralfate (CARAFATE) tablet 1 g  1 g Oral 4 times per day Tex Mojica MD   1 g at 20 0630    phytonadione (ADULT) (VITAMIN K) 10 mg in dextrose 5 % 100 mL IVPB  10 mg Intravenous Once Art Roman MD        sodium chloride flush 0.9 % injection 10 mL  10 mL Intravenous 2 times per day Art Roman MD   10 mL at 20 1956    sodium chloride flush 0.9 % injection 10 mL  10 mL Intravenous PRN Art Roman MD   10 mL at 20 0630    acetaminophen (TYLENOL) tablet 650 mg  650 mg Oral Q6H PRN Art Roman MD   650 mg at 20 1144    Or    acetaminophen (TYLENOL) suppository 650 mg  650 mg Rectal Q6H PRN Art Roman MD        [facility] injection 4 mg  4 mg Intravenous Q6H PRN Art Roman MD        pantoprazole (PROTONIX) injection 40 mg  40 mg Intravenous Q12H Art Roman MD   40 mg at 20 0630    And    sodium chloride (PF) 0.9 % injection 10 mL  10 mL Intravenous Q12H Mitesh Ortiz MD   10 mL at 20 1995    levothyroxine (SYNTHROID) tablet 100 mcg  100 mcg Oral Daily Mitesh Ortiz MD   100 mcg at 20 0630       Allergies:  No Known Allergies    Problem List:    Patient Active Problem List   Diagnosis Code    Macular hole of right eye H35.341    Acquired hypothyroidism E03.9    H/O aortic valve replacement Z95.2    H/O mitral valve replacement Z95.2    [facility]non-ST elevated [facility]) [facility] I21.4    Atrial flutter with rapid ventricular response (HCC) I48.92    Sick sinus syndrome (HCC) I49.5    Chronic systolic congestive heart failure (HCC) I50.22    Cardiomyopathy ([facility] I42.9    Iron deficiency anemia D50.9    UGIB (upper gastrointestinal bleed) K92.2    Chronic atrial flutter (HCC) I48.92    Acute blood loss anemia D62    Duodenitis K29.80    Duodenal ulcer K26.9    Warfarin-induced coagulopathy (HCC) D68.32, T45.515A    JOSEPH (acute kidney injury) ([facility] N17.9       Past Medical History:        Diagnosis Date    H/O mitral valve replacement     Hypertension     Hypothyroidism     S/P aortic valve replacement        Past Surgical History:        Procedure Laterality Date    CARDIAC VALVE REPLACEMENT      twice-1. MV 20 years ago 2.  AV 1.5 years after (20)    CARDIOVERSION  2020    ECHO COMPL W DOP COLOR FLOW  2012         TRANSESOPHAGEAL ECHOCARDIOGRAM  2020    UPPER GASTROINTESTINAL ENDOSCOPY N/A 12/3/2020    EGD DIAGNOSTIC ONLY performed by Mitesh Ortiz MD at [facility] History:    Social History     Tobacco Use    Smoking status: Former Smoker     Packs/day: 0.10     Years: 45.00     Pack years: 4.50     Types: Cigarettes     Start date:      Last attempt to quit: 2019     Years since quittin.9    Smokeless tobacco: Never Used   Substance Use Topics    Alcohol use: Yes     Comment: rare; 2-3 cups coffee daily (decaf)                                Counseling given: Not Answered      Vital Signs (Current): There were no vitals filed for this visit. BP Readings from Last 3 Encounters:   12/03/20 102/67   12/03/20 122/72   12/02/20 (!) 72/48       NPO Status:  12/3/2020 2200                                                                               BMI:   Wt Readings from Last 3 Encounters:   12/02/20 185 lb (83.9 kg)   12/02/20 158 lb 12.8 oz (72 kg)   08/27/20 156 lb (70.8 kg)     There is no height or weight on file to calculate BMI.    CBC:   Lab Results   Component Value Date    WBC 13.6 12/02/2020    RBC 1.67 12/02/2020    HGB 8.0 12/04/2020    HCT 25.2 12/04/2020    MCV 82.6 12/02/2020    RDW 21.0 12/02/2020     12/02/2020       CMP:   Lab Results   Component Value Date     12/02/2020    K 5.2 12/02/2020    K 3.9 06/19/2020     12/02/2020    CO2 14 12/02/2020    BUN 43 12/02/2020    CREATININE 1.4 12/02/2020    GFRAA 45 12/02/2020    LABGLOM 37 12/02/2020    GLUCOSE 122 12/02/2020    GLUCOSE 93 01/25/2012    PROT 5.8 12/02/2020    CALCIUM 8.4 12/02/2020    BILITOT 0.8 12/02/2020    ALKPHOS 93 12/02/2020    AST 50 12/02/2020    ALT 36 12/02/2020       POC Tests: No results for input(s): POCGLU, POCNA, POCK, POCCL, POCBUN, POCHEMO, POCHCT in the last 72 hours.     Coags:   Lab Results   Component Value Date    PROTIME 35.1 12/02/2020    PROTIME 20.5 07/08/2020    INR 3.0 12/02/2020    APTT 32.4 05/20/2020       HCG (If Applicable): No results found for: PREGTESTUR, PREGSERUM, HCG, HCGQUANT     ABGs: No results found for: PHART, PO2ART, WEM9DGU, EHJ8RVJ, BEART, Z1CZHLDX     Type & Screen (If Applicable):  No results found for: LABABO, LABRH    Drug/Infectious Status (If Applicable):  No results found for: HIV, HEPCAB    COVID-19 Screening (If Applicable):   Lab Results   Component Value Date    COVID19 Not Detected 12/02/2020         Anesthesia

## 2020-12-04 NOTE — ANESTHESIA POSTPROCEDURE EVALUATION
Department of Anesthesiology  Postprocedure Note    Patient: Kilo Vann  MRN: [id number]  YOB: 1950  Date of evaluation: 12/4/2020  Time:  9:34 AM     Procedure Summary     Date:  12/04/20 Room / Location:  [address] [place] [address] / [facility]    Anesthesia Start:  7907 Anesthesia Stop:  Ángela Almeida    Procedures:       COLONOSCOPY POLYPECTOMY SNARE/COLD BIOPSY (N/A )      COLONOSCOPY CONTROL HEMORRHAGE Diagnosis:  (.)    Surgeon:  Alice Mckinley MD Responsible Provider:  Ryan Wesley MD    Anesthesia Type:  MAC ASA Status:  4          Anesthesia Type: MAC    Gracia Phase I: Gracia Score: 9    Gracia Phase II:      Last vitals: Reviewed and per EMR flowsheets.        Anesthesia Post Evaluation    Patient location during evaluation: PACU  Patient participation: complete - patient participated  Level of consciousness: awake and alert  Airway patency: patent  Nausea & Vomiting: no nausea and no vomiting  Complications: no  Cardiovascular status: hemodynamically stable  Respiratory status: acceptable  Hydration status: euvolemic

## 2020-12-04 NOTE — PROGRESS NOTES
Subjective:  Feeling better   No CP or SOB  No fever or chills   No uncontrolled pain  No vomiting or diarrhea     Objective:    /67   Pulse 67   Temp 97.5 °F (36.4 °C) (Temporal)   Resp 16   Ht 5' 5\" (1.651 m)   Wt 185 lb (83.9 kg)   SpO2 95%   BMI 30.79 kg/m²     24HR INTAKE/OUTPUT:      Intake/Output Summary (Last 24 hours) at 12/4/2020 0513  Last data filed at 12/3/2020 1725  Gross per 24 hour   Intake 740 ml   Output --   Net 740 ml       General appearance: NAD, conversant  Neck: FROM, supple   Lungs: Clear bilaterally no wheezes, no rhonchi, no crackles  CV: tachy, no MRGs; normal carotid upstroke and amplitude without Bruits  Abdomen: Soft, non-tender; no masses or HSM  Extremities: No edema, no cyanosis, no clubbing  Skin: Intact no rash, no lesions, no ulcers    Psych: Alert and oriented normal affect  Neuro: Nonfocal  Most Recent Labs  Lab Results   Component Value Date    WBC 13.6 (H) 12/02/2020    HGB 8.0 (L) 12/04/2020    HCT 25.2 (L) 12/04/2020     12/02/2020     12/02/2020    K 5.2 (H) 12/02/2020     12/02/2020    CREATININE 1.4 (H) 12/02/2020    BUN 43 (H) 12/02/2020    CO2 14 (L) 12/02/2020    GLUCOSE 122 (H) 12/02/2020    ALT 36 (H) 12/02/2020    AST 50 (H) 12/02/2020    INR 3.0 12/02/2020    TSH 2.890 12/02/2020    LABA1C 5.6 05/15/2020     No results for input(s): MG in the last 72 hours.   Lab Results   Component Value Date    CALCIUM 8.4 (L) 12/02/2020    PHOS 3.6 05/15/2020        XR CHEST PORTABLE   Final Result   Persistent cardiomegaly with new small right pleural effusion   Stable linear scar in both lung bases   Status post aortic and mitral valve prosthesis      XR CHEST (2 VW)    (Results Pending)       Assessment    Active Problems:    Chronic systolic congestive heart failure (HCC)    UGIB (upper gastrointestinal bleed)    Chronic atrial flutter (HCC)    Acute blood loss anemia    Duodenitis    Duodenal ulcer    Warfarin-induced coagulopathy ([facility] JOSEPH (acute kidney injury) ([facility]  Resolved Problems:    * No resolved hospital problems.  *      Plan:  76-year-old female history of chronic systolic CHF, a flutter on Coumadin admitted with anemia and suspected GI bleeding status post EGD showing duodenitis and duodenal ulcer without evidence of active bleeding, cscope today     INR reversed with vitamin K and FFP  Transfused 3 units PRBC in ED  NPO advance diet after cscope  Monitor H&H  Transfuse PRN maintain hemoglobin >= 7  IV PPI  Hold nephrotoxins  Monitor renal function  Gen surgical Consult appreciated   Cardiology consult appreciated  Medications for other co morbidities cont as appropriate w dosage adjustments as necessary  DVT PPx SCD  DC planning     Electronically signed by Cherise Palacios MD on 12/4/2020 at 5:13 AM

## 2020-12-05 VITALS
TEMPERATURE: 98 F | BODY MASS INDEX: 30.82 KG/M2 | HEIGHT: 65 IN | DIASTOLIC BLOOD PRESSURE: 74 MMHG | WEIGHT: 185 LBS | SYSTOLIC BLOOD PRESSURE: 127 MMHG | OXYGEN SATURATION: 96 % | RESPIRATION RATE: 24 BRPM | HEART RATE: 94 BPM

## 2020-12-05 LAB
ANION GAP SERPL CALCULATED.3IONS-SCNC: 10 MMOL/L (ref 7–16)
BUN BLDV-MCNC: 28 MG/DL (ref 8–23)
CALCIUM SERPL-MCNC: 8.3 MG/DL (ref 8.6–10.2)
CHLORIDE BLD-SCNC: 105 MMOL/L (ref 98–107)
CO2: 20 MMOL/L (ref 22–29)
CREAT SERPL-MCNC: 0.9 MG/DL (ref 0.5–1)
GFR AFRICAN AMERICAN: >60
GFR NON-AFRICAN AMERICAN: >60 ML/MIN/1.73
GLUCOSE BLD-MCNC: 94 MG/DL (ref 74–99)
HCT VFR BLD CALC: 24.7 % (ref 34–48)
HEMOGLOBIN: 7.6 G/DL (ref 11.5–15.5)
POTASSIUM SERPL-SCNC: 4.1 MMOL/L (ref 3.5–5)
SODIUM BLD-SCNC: 135 MMOL/L (ref 132–146)

## 2020-12-05 PROCEDURE — 86677 HELICOBACTER PYLORI ANTIBODY: CPT

## 2020-12-05 PROCEDURE — 2580000003 HC RX 258: Performed by: SURGERY

## 2020-12-05 PROCEDURE — 36415 COLL VENOUS BLD VENIPUNCTURE: CPT

## 2020-12-05 PROCEDURE — 80048 BASIC METABOLIC PNL TOTAL CA: CPT

## 2020-12-05 PROCEDURE — 6370000000 HC RX 637 (ALT 250 FOR IP): Performed by: SURGERY

## 2020-12-05 PROCEDURE — 85018 HEMOGLOBIN: CPT

## 2020-12-05 PROCEDURE — 85014 HEMATOCRIT: CPT

## 2020-12-05 PROCEDURE — 99231 SBSQ HOSP IP/OBS SF/LOW 25: CPT | Performed by: SURGERY

## 2020-12-05 PROCEDURE — 6360000002 HC RX W HCPCS: Performed by: SURGERY

## 2020-12-05 RX ORDER — METOPROLOL SUCCINATE 25 MG/1
25 TABLET, EXTENDED RELEASE ORAL DAILY
Qty: 30 TABLET | Refills: 3 | Status: SHIPPED | OUTPATIENT
Start: 2020-12-05 | End: 2020-12-05

## 2020-12-05 RX ORDER — PANTOPRAZOLE SODIUM 40 MG/1
40 TABLET, DELAYED RELEASE ORAL
Qty: 30 TABLET | Refills: 3 | Status: SHIPPED | OUTPATIENT
Start: 2020-12-05 | End: 2021-01-01

## 2020-12-05 RX ORDER — METOPROLOL SUCCINATE 25 MG/1
25 TABLET, EXTENDED RELEASE ORAL DAILY
Qty: 30 TABLET | Refills: 3 | Status: SHIPPED | OUTPATIENT
Start: 2020-12-05 | End: 2020-12-09

## 2020-12-05 RX ORDER — PANTOPRAZOLE SODIUM 40 MG/1
40 TABLET, DELAYED RELEASE ORAL
Qty: 30 TABLET | Refills: 3 | Status: SHIPPED | OUTPATIENT
Start: 2020-12-05 | End: 2020-12-05

## 2020-12-05 RX ORDER — SUCRALFATE 1 G/1
1 TABLET ORAL 4 TIMES DAILY
Qty: 120 TABLET | Refills: 3 | Status: SHIPPED | OUTPATIENT
Start: 2020-12-05 | End: 2020-12-05

## 2020-12-05 RX ORDER — SUCRALFATE 1 G/1
1 TABLET ORAL 4 TIMES DAILY
Qty: 120 TABLET | Refills: 3 | Status: SHIPPED | OUTPATIENT
Start: 2020-12-05 | End: 2021-01-01

## 2020-12-05 RX ORDER — WARFARIN SODIUM 5 MG/1
5 TABLET ORAL DAILY
Qty: 30 TABLET | Refills: 3 | Status: SHIPPED | OUTPATIENT
Start: 2020-12-05 | End: 2020-12-05 | Stop reason: SDUPTHER

## 2020-12-05 RX ORDER — WARFARIN SODIUM 5 MG/1
5 TABLET ORAL DAILY
Qty: 30 TABLET | Refills: 3 | Status: SHIPPED | OUTPATIENT
Start: 2020-12-05 | End: 2021-01-01

## 2020-12-05 RX ADMIN — PANTOPRAZOLE SODIUM 40 MG: 40 TABLET, DELAYED RELEASE ORAL at 06:58

## 2020-12-05 RX ADMIN — SUCRALFATE 1 G: 1 TABLET ORAL at 06:58

## 2020-12-05 RX ADMIN — DIGOXIN 125 MCG: 0.25 INJECTION INTRAMUSCULAR; INTRAVENOUS at 10:34

## 2020-12-05 RX ADMIN — METOPROLOL SUCCINATE 25 MG: 25 TABLET, EXTENDED RELEASE ORAL at 10:33

## 2020-12-05 RX ADMIN — SUCRALFATE 1 G: 1 TABLET ORAL at 01:57

## 2020-12-05 RX ADMIN — Medication 10 ML: at 10:33

## 2020-12-05 RX ADMIN — LEVOTHYROXINE SODIUM 100 MCG: 0.1 TABLET ORAL at 06:58

## 2020-12-05 ASSESSMENT — PAIN SCALES - GENERAL: PAINLEVEL_OUTOF10: 0

## 2020-12-05 NOTE — DISCHARGE INSTR - MEDS
Patient may resume warfarin once she is discharged according to cardiology and surgical service  She may also resume Entresto once she sees her PCP early this week to have evaluation of her hemoglobin and warfarin dosing

## 2020-12-05 NOTE — DISCHARGE SUMMARY
Discharge Summary    Tatianna Cardenas  :  1950  MRN:  [id number]    Admit date:  2020  Discharge date:  2020 9:56 AM    Admitting Physician:  Malini Quintero MD    Discharge Diagnoses:    Patient Active Problem List    Diagnosis Date Noted    Duodenitis 2020    Duodenal ulcer 2020    Warfarin-induced coagulopathy ([address] 2020    JOSEPH (acute kidney injury) ([address] 2020    Chronic atrial flutter ([address] 2020    Acute blood loss anemia 2020    UGIB (upper gastrointestinal bleed) 2020    Iron deficiency anemia 2020    Chronic systolic congestive heart failure ([address] 2020    Cardiomyopathy ([address] 2020    Sick sinus syndrome ([address] 2020    NSTEMI (non-ST elevated myocardial infarction) ([facility] 2020    Atrial flutter with rapid ventricular response ([address] 2020    Macular hole of right eye 2017    Acquired hypothyroidism 2017    H/O aortic valve replacement 2017    H/O mitral valve replacement 2017       Past Medical Hx :   Past Medical History:   Diagnosis Date    H/O mitral valve replacement     Hypertension     Hypothyroidism     S/P aortic valve replacement        Past Surgical Hx :   Past Surgical History:   Procedure Laterality Date    CARDIAC VALVE REPLACEMENT      twice-1. MV 20 years ago 2.  AV 1.5 years after (20)    CARDIOVERSION  2020    COLONOSCOPY N/A 2020    COLONOSCOPY POLYPECTOMY SNARE/COLD BIOPSY performed by Shanae Knapp MD at [facility] 9  2020    COLONOSCOPY CONTROL HEMORRHAGE performed by Shanae Knapp MD at [address] [facility] ECHO COMPL W DOP COLOR FLOW  2012         TRANSESOPHAGEAL ECHOCARDIOGRAM  2020    UPPER GASTROINTESTINAL ENDOSCOPY N/A 12/3/2020    EGD DIAGNOSTIC ONLY performed by Sh[facility]e Knapp MD at [facility] ENDOSCOPY       Admission Condition:  poor    Discharged Condition:  good    Labs:  CBC:   Lab Results   Component Value Date    WBC 13.6 12/02/2020    RBC 1.67 12/02/2020    HGB 7.6 12/05/2020    HCT 24.7 12/05/2020    MCV 82.6 12/02/2020    MCH 21.0 12/02/2020    MCHC 25.4 12/02/2020    RDW 21.0 12/02/2020     12/02/2020    MPV 11.0 12/02/2020     Hemoglobin/Hematocrit:    Lab Results   Component Value Date    HGB 7.6 12/05/2020    HCT 24.7 12/05/2020     BMP:    Lab Results   Component Value Date     12/05/2020    K 4.1 12/05/2020    K 3.9 06/19/2020     12/05/2020    CO2 20 12/05/2020    BUN 28 12/05/2020    LABALBU 3.8 12/02/2020    LABALBU 4.4 01/25/2012    CREATININE 0.9 12/05/2020    CALCIUM 8.3 12/05/2020    GFRAA >60 12/05/2020    LABGLOM >60 12/05/2020    GLUCOSE 94 12/05/2020    GLUCOSE 93 01/25/2012        Radiology Results: Xr Chest Portable    Result Date: 12/2/2020  EXAMINATION: ONE XRAY VIEW OF THE CHEST 12/2/2020 4:06 pm COMPARISON: 06/19/2020 HISTORY: ORDERING SYSTEM PROVIDED HISTORY: short of breath TECHNOLOGIST PROVIDED HISTORY: History of aortic and mitral valve replacement. Reason for exam:->short of breath What reading provider will be dictating this exam?->CRC FINDINGS: Sternotomy cerclage wires are again noted. Prosthetic aortic and mitral valve again noted. Cardiac silhouette size remains enlarged. No definite vascular congestion. New small right pleural effusion blunts the right CP angle. No left pleural effusion. No pneumothorax or acute displaced fracture. Stable linear scar in both lung bases. No new pulmonary consolidation or infiltrate.     Persistent cardiomegaly with new small right pleural effusion Stable linear scar in both lung bases Status post aortic and mitral valve prosthesis      Hospital Course:  60-year-old female history of chronic systolic CHF, a flutter on Coumadin admitted with anemia and suspected GI bleeding status post EGD showing duodenitis and duodenal ulcer without evidence of active bleeding, cscope today     INR reversed with vitamin K and FFP  Transfused 3 units PRBC in ED  Colonoscopy with polypectomy and cold snare  Bleeding stabilized  NPO advance diet after cscope  Monitor H&H  Transfuse PRN maintain hemoglobin >= 7  IV PPI  Hold nephrotoxins  Monitor renal function  Gen surgical Consult appreciated   Cardiology consult appreciated  Medications for other co morbidities cont as appropriate w dosage adjustments as necessary  DVT PPx SCD  DC planning-possible discharge today with 3 resumption of anticoagulant she has been released by both services    Discharge Medications:    Kellee Li   Home Medication Instructions PZW:[id number]    Printed on:12/05/20 0618   Medication Information                      levothyroxine (SYNTHROID) 100 MCG tablet  Take 1 tablet by mouth Daily             metoprolol succinate (TOPROL XL) 25 MG extended release tablet  Take 1 tablet by mouth daily             pantoprazole (PROTONIX) 40 MG tablet  Take 1 tablet by mouth 2 times daily (before meals)             sucralfate (CARAFATE) 1 GM tablet  Take 1 tablet by mouth 4 times daily             warfarin (COUMADIN) 5 MG tablet  Take 1 tablet by mouth daily                 Discharge Exam:  General appearance: NAD, conversant mildly sallow to pale appearing  Neck: FROM, supple   Lungs: Clear bilaterally no wheezes, no rhonchi, no crackles  CV: Rate now controlled no MRGs; normal carotid upstroke and amplitude without Bruits  Abdomen: Soft, non-tender; no masses or HSM  Extremities: No edema, no cyanosis, no clubbing  Skin: Intact no rash, no lesions, no ulcers    Psych: Alert and oriented normal affect  Neuro: Nonfocal      Disposition: home    Based on the reports per surgery and cardiology patient may resume her warfarin once at home  She may also resume Entresto once she follows up with her PCP early this week for evaluation of her hemoglobin and her INR    Patient Instructions:   REFER TO AVR or CHERYL document    Signed:  Nirav Linton MD FACP  American Board of Internal Medicine  American Board of Geriatric Medicine  12/5/2020, 9:56 AM

## 2020-12-05 NOTE — PROGRESS NOTES
cardiomegaly and small right pleural effusion   Stable linear scar lateral left lung base      XR CHEST PORTABLE   Final Result   Persistent cardiomegaly with new small right pleural effusion   Stable linear scar in both lung bases   Status post aortic and mitral valve prosthesis          Assessment    Active Problems:    Chronic systolic congestive heart failure (HCC)    UGIB (upper gastrointestinal bleed)    Chronic atrial flutter (HCC)    Acute blood loss anemia    Duodenitis    Duodenal ulcer    Warfarin-induced coagulopathy (HCC)    JOSEPH (acute kidney injury) ([facility]  Resolved Problems:    * No resolved hospital problems.  *      Plan:  59-year-old female history of chronic systolic CHF, a flutter on Coumadin admitted with anemia and suspected GI bleeding status post EGD showing duodenitis and duodenal ulcer without evidence of active bleeding, cscope today     INR reversed with vitamin K and FFP  Transfused 3 units PRBC in ED  Colonoscopy with polypectomy and cold snare  Bleeding stabilized  NPO advance diet after cscope  Monitor H&H  Transfuse PRN maintain hemoglobin >= 7  IV PPI  Hold nephrotoxins  Monitor renal function  Gen surgical Consult appreciated   Cardiology consult appreciated  Medications for other co morbidities cont as appropriate w dosage adjustments as necessary  DVT PPx SCD  DC planning-possible discharge today with 3 resumption of anticoagulant she has been released by both services  Hemodynamically stable with no evidence of further bleeding     Electronically signed by Maggie Jensen MD on 12/5/2020 at 9:48 AM

## 2020-12-07 ENCOUNTER — CARE COORDINATION (OUTPATIENT)
Dept: CASE MANAGEMENT | Age: 70
End: 2020-12-07

## 2020-12-08 ENCOUNTER — CARE COORDINATION (OUTPATIENT)
Dept: CASE MANAGEMENT | Age: 70
End: 2020-12-08

## 2020-12-09 ENCOUNTER — OFFICE VISIT (OUTPATIENT)
Dept: CARDIOLOGY CLINIC | Age: 70
End: 2020-12-09
Payer: MEDICARE

## 2020-12-09 VITALS
HEIGHT: 66 IN | DIASTOLIC BLOOD PRESSURE: 80 MMHG | SYSTOLIC BLOOD PRESSURE: 138 MMHG | BODY MASS INDEX: 27.8 KG/M2 | WEIGHT: 173 LBS | RESPIRATION RATE: 12 BRPM | HEART RATE: 113 BPM

## 2020-12-09 LAB — HELICOBACTER PYLORI IGG: NORMAL

## 2020-12-09 PROCEDURE — 93000 ELECTROCARDIOGRAM COMPLETE: CPT | Performed by: INTERNAL MEDICINE

## 2020-12-09 PROCEDURE — 1036F TOBACCO NON-USER: CPT | Performed by: INTERNAL MEDICINE

## 2020-12-09 PROCEDURE — G8427 DOCREV CUR MEDS BY ELIG CLIN: HCPCS | Performed by: INTERNAL MEDICINE

## 2020-12-09 PROCEDURE — 1123F ACP DISCUSS/DSCN MKR DOCD: CPT | Performed by: INTERNAL MEDICINE

## 2020-12-09 PROCEDURE — 1090F PRES/ABSN URINE INCON ASSESS: CPT | Performed by: INTERNAL MEDICINE

## 2020-12-09 PROCEDURE — 99214 OFFICE O/P EST MOD 30 MIN: CPT | Performed by: INTERNAL MEDICINE

## 2020-12-09 PROCEDURE — G8484 FLU IMMUNIZE NO ADMIN: HCPCS | Performed by: INTERNAL MEDICINE

## 2020-12-09 PROCEDURE — 3017F COLORECTAL CA SCREEN DOC REV: CPT | Performed by: INTERNAL MEDICINE

## 2020-12-09 PROCEDURE — 1111F DSCHRG MED/CURRENT MED MERGE: CPT | Performed by: INTERNAL MEDICINE

## 2020-12-09 PROCEDURE — G8400 PT W/DXA NO RESULTS DOC: HCPCS | Performed by: INTERNAL MEDICINE

## 2020-12-09 PROCEDURE — G8417 CALC BMI ABV UP PARAM F/U: HCPCS | Performed by: INTERNAL MEDICINE

## 2020-12-09 PROCEDURE — 4040F PNEUMOC VAC/ADMIN/RCVD: CPT | Performed by: INTERNAL MEDICINE

## 2020-12-09 RX ORDER — METOPROLOL SUCCINATE 50 MG/1
50 TABLET, EXTENDED RELEASE ORAL DAILY
Qty: 90 TABLET | Refills: 3 | Status: SHIPPED
Start: 2020-12-09 | End: 2021-01-01 | Stop reason: SDUPTHER

## 2020-12-09 RX ORDER — SACUBITRIL AND VALSARTAN 97; 103 MG/1; MG/1
0.5 TABLET, FILM COATED ORAL 2 TIMES DAILY
Qty: 90 TABLET | Refills: 3 | Status: SHIPPED
Start: 2020-12-09 | End: 2021-01-01 | Stop reason: SDUPTHER

## 2020-12-09 RX ORDER — BUMETANIDE 1 MG/1
1 TABLET ORAL DAILY
Qty: 90 TABLET | Refills: 3 | Status: SHIPPED
Start: 2020-12-09 | End: 2021-01-01 | Stop reason: SDUPTHER

## 2020-12-09 RX ORDER — DIGOXIN 125 MCG
125 TABLET ORAL DAILY
Qty: 90 TABLET | Refills: 3 | Status: SHIPPED
Start: 2020-12-09 | End: 2021-01-01 | Stop reason: SDUPTHER

## 2020-12-09 NOTE — PATIENT INSTRUCTIONS
Restart:    Digoxin 0.125 mg daily    Entresto 1/2 pill daily    bumex 1 mg daily    Increase toprol to 50 mg daily.

## 2020-12-09 NOTE — PROGRESS NOTES
Marital status:      Spouse name: Not on file    Number of children: Not on file    Years of education: Not on file    Highest education level: Not on file   Occupational History    Occupation: retired   Social Needs    Financial resource strain: Not on file    Food insecurity     Worry: Not on file     Inability: Not on file   [employer] needs     Medical: Not on file     Non-medical: Not on file   Tobacco Use    Smoking status: Former Smoker     Packs/day: 0.10     Years: 45.00     Pack years: 4.50     Types: Cigarettes     Start date:      Last attempt to quit: 2019     Years since quittin.9    Smokeless tobacco: Never Used   Substance and Sexual Activity    Alcohol use: Yes     Comment: rare; 2-3 cups coffee daily (decaf)    Drug use: No    Sexual activity: Yes     Partners: Male   Lifestyle    Physical activity     Days per week: Not on file     Minutes per session: Not on file    Stress: Not on file   Relationships    Social connections     Talks on phone: Not on file     Gets together: Not on file     Attends Faith service: Not on file     Active member of club or organization: Not on file     Attends meetings of clubs or organizations: Not on file     Relationship status: Not on file    Intimate partner violence     Fear of current or ex partner: Not on file     Emotionally abused: Not on file     Physically abused: Not on file     Forced sexual activity: Not on file   Other Topics Concern    Not on file   Social History Narrative    Not on file       History reviewed. No pertinent family history. Review of Systems:  Heart: as above   Lungs: as above   Eyes: denies changes in vision or discharge. Ears: denies changes in hearing or pain. Nose: denies epistaxis or masses   Throat: denies sore throat or trouble swallowing. Neuro: denies numbness, tingling, tremors. Skin: denies rashes or itching.    : denies hematuria, dysuria   GI: denies vomiting, diarrhea   Psych: denies mood changed, anxiety, depression. All other systems negative. Physical Exam   /80   Pulse 113   Resp 12   Ht 5' 5.5\" (1.664 m)   Wt 173 lb (78.5 kg)   BMI 28.35 kg/m²   Constitutional: Oriented to person, place, and time. Well-developed and well-nourished. No distress. Head: Normocephalic and atraumatic. Eyes: EOM are normal. Pupils are equal, round, and reactive to light. Neck: Normal range of motion. Neck supple. No hepatojugular reflux and no JVD present. Carotid bruit is not present. No tracheal deviation present. No thyromegaly present. Cardiovascular: Normal rate, regular rhythm, normal heart sounds and intact distal pulses. Exam reveals no gallop and no friction rub. No murmur heard. Pulmonary/Chest: Effort normal and breath sounds normal. No respiratory distress. No wheezes. No rales. No tenderness. Abdominal: Soft. Bowel sounds are normal. No distension and no mass. No tenderness. No rebound and no guarding. Musculoskeletal: Normal range of motion. No edema and no tenderness. Lymphadenopathy:   No cervical adenopathy. No groin adenopathy. Neurological: Alert and oriented to person, place, and time. Skin: Skin is warm and dry. No rash noted. Not diaphoretic. No erythema. Psychiatric: Normal mood and affect. Behavior is normal.     EKG: Atrial flutter, nonspecific ST and T waves changes.     ASSESSMENT AND PLAN:  Patient Active Problem List   Diagnosis    Macular hole of right eye    Acquired hypothyroidism    H/O aortic valve replacement    H/O mitral valve replacement    [facility]-ST elevated [facility]) [facility]    Atrial flutter with rapid ventricular response (HCC)    Sick sinus syndrome (HCC)    Chronic systolic congestive heart failure (HCC)    Cardiomyopathy (HCC)    Iron deficiency anemia    UGIB (upper gastrointestinal bleed)    Chronic atrial flutter (HCC)    Acute blood loss anemia    Duodenitis    Duodenal ulcer    [facility]in-[facility] co[facility]opathy [facility]    JOSEPH (acute kidney injury) ([facility]     1. GIB/Severe anemia/Ulcer: Per surgery. 2. Chronic systolic HF:     Restart medications. 3. Chronic Aflutter:     Titrate rate control medications. Warfarin. 4. History of mechanical aortic and mitral valve replacements:     Warfarin. 5. Elevated troponin: Stress normal perfusion 5/19/2020.    6. HTN: Observe. Sergio Lynch D.O.   Cardiologist  Cardiology, [facility]

## 2020-12-10 ENCOUNTER — TELEPHONE (OUTPATIENT)
Dept: SURGERY | Age: 70
End: 2020-12-10

## 2020-12-10 NOTE — TELEPHONE ENCOUNTER
KAYLAH Perez received a call from patient wanting to schedule an appointment. MA scheduled pt for 12/28/2020 @ 9:30am with  in ' [facility]. Patient verbalized appointment date, time and location. MA verified number that was good to do reminder call on was the one listed in chart. MA advised patient where to park and enter in the building for the appointment.     Electronically signed by Kacie Méndez MA on 12/10/20 at 10:38 AM EST

## 2020-12-15 ENCOUNTER — CARE COORDINATION (OUTPATIENT)
Dept: CASE MANAGEMENT | Age: 70
End: 2020-12-15

## 2020-12-15 NOTE — CARE COORDINATION
[facility] Transitions Follow Up Call    12/15/2020    Patient: Ryan Townsend  Patient : 1950   MRN: <[id number]>  Reason for Admission: Anemia  Discharge Date: 20 RARS: Readmission Risk Score: 17       Attempted to contact patient for BPCI-A call. VMB not set up. Alternate number \"user busy\" signal received.     Follow Up  Future Appointments   Date Time Provider Ai Sanchez   2020  9:30 AM Georgiana Dominguez MD [facility]   2021  8:45 AM Monty Smith DO [facility]       Massiel Robert RN

## 2020-12-17 ENCOUNTER — ANTI-COAG VISIT (OUTPATIENT)
Dept: FAMILY MEDICINE CLINIC | Age: 70
End: 2020-12-17

## 2020-12-17 LAB — INR BLD: 1.2

## 2020-12-17 NOTE — PROGRESS NOTES
Was hospitalized for awhile for a GI bleed, had transfusions. Hospital d/c her with instruction to take 5 mg of coumadin daily.

## 2020-12-23 ENCOUNTER — ANTI-COAG VISIT (OUTPATIENT)
Dept: FAMILY MEDICINE CLINIC | Age: 70
End: 2020-12-23

## 2020-12-23 LAB — INR BLD: 1.2

## 2020-12-28 ENCOUNTER — OFFICE VISIT (OUTPATIENT)
Dept: SURGERY | Age: 70
End: 2020-12-28
Payer: MEDICARE

## 2020-12-28 VITALS
WEIGHT: 151 LBS | RESPIRATION RATE: 15 BRPM | BODY MASS INDEX: 24.27 KG/M2 | SYSTOLIC BLOOD PRESSURE: 124 MMHG | OXYGEN SATURATION: 100 % | DIASTOLIC BLOOD PRESSURE: 73 MMHG | HEART RATE: 71 BPM | HEIGHT: 66 IN

## 2020-12-28 PROBLEM — D12.6 TUBULAR ADENOMA OF COLON: Status: ACTIVE | Noted: 2020-12-28

## 2020-12-28 PROBLEM — A04.8 H. PYLORI INFECTION: Status: ACTIVE | Noted: 2020-12-28

## 2020-12-28 PROCEDURE — G8427 DOCREV CUR MEDS BY ELIG CLIN: HCPCS | Performed by: SURGERY

## 2020-12-28 PROCEDURE — G8400 PT W/DXA NO RESULTS DOC: HCPCS | Performed by: SURGERY

## 2020-12-28 PROCEDURE — 1090F PRES/ABSN URINE INCON ASSESS: CPT | Performed by: SURGERY

## 2020-12-28 PROCEDURE — 3017F COLORECTAL CA SCREEN DOC REV: CPT | Performed by: SURGERY

## 2020-12-28 PROCEDURE — 1036F TOBACCO NON-USER: CPT | Performed by: SURGERY

## 2020-12-28 PROCEDURE — 1111F DSCHRG MED/CURRENT MED MERGE: CPT | Performed by: SURGERY

## 2020-12-28 PROCEDURE — G8420 CALC BMI NORM PARAMETERS: HCPCS | Performed by: SURGERY

## 2020-12-28 PROCEDURE — 1123F ACP DISCUSS/DSCN MKR DOCD: CPT | Performed by: SURGERY

## 2020-12-28 PROCEDURE — 99213 OFFICE O/P EST LOW 20 MIN: CPT | Performed by: SURGERY

## 2020-12-28 PROCEDURE — G8484 FLU IMMUNIZE NO ADMIN: HCPCS | Performed by: SURGERY

## 2020-12-28 PROCEDURE — 4040F PNEUMOC VAC/ADMIN/RCVD: CPT | Performed by: SURGERY

## 2020-12-28 PROCEDURE — 99212 OFFICE O/P EST SF 10 MIN: CPT | Performed by: SURGERY

## 2020-12-28 RX ORDER — TETRACYCLINE HYDROCHLORIDE 500 MG/1
500 CAPSULE ORAL 4 TIMES DAILY
Qty: 56 CAPSULE | Refills: 0 | Status: SHIPPED | OUTPATIENT
Start: 2020-12-28 | End: 2021-01-11

## 2020-12-28 RX ORDER — METRONIDAZOLE 250 MG/1
250 TABLET ORAL 4 TIMES DAILY
Qty: 56 TABLET | Refills: 0 | Status: SHIPPED | OUTPATIENT
Start: 2020-12-28 | End: 2021-01-11

## 2020-12-28 NOTE — PATIENT INSTRUCTIONS
stop Carafate, continue taking pantoprazole as prescribed. Any questions or concerns, please contact my medical assistant Vida at [phone].

## 2020-12-28 NOTE — PROGRESS NOTES
[facility] SURGICAL ASS[facility]IATES/[facility]  PROGRESS NOTE  ATTENDING NOTE    Chief Complaint   Patient presents with    Colonoscopy     DOS 12/4/2020    EGD     DOS 12/3/2020, F/U duodenal ulcer    Abdominal Pain     Pt denies    Nausea & Vomiting     Pt denies    Other     Pt doesnt have insurance for prescriptions      S:  69y/o F presents for f/u of hospital visit. She had a colonoscopy in which 3 polyps were removed that were all tubular adenomas. She had an EGD done which showed a healing duodenal ulcer. She denies any further bleeding and has been tolerating a normal diet. /73 (Site: Right Upper Arm, Position: Sitting, Cuff Size: Medium Adult)   Pulse 71   Resp 15   Ht 5' 5.5\" (1.664 m)   Wt 151 lb (68.5 kg)   SpO2 100%   BMI 24.75 kg/m²     ASSESSMENT/PLAN:  1.  H pylori infection--patient has had z-pack before, so will treat with quadruple therapy--bismuth, tetracycline, flagyl and PPI. I advised I will see her in one month for H pylori stool study  2. Tubular adenomas--repeat colonoscopy in 3 years. RTC 1 month for H pylori check    Wiliam Devries MD, MSc, FACS  12/28/2020  5:22 PM     I spent 20 minutes personally with the patient/family/staff/resident(s) in examination, chart and imaging review, discussing natural history and prognosis, differential diagnosis, risks and benefits of treatment and instructions of which more than 50% of the time was spent for counseling and coordinating care.

## 2020-12-30 ENCOUNTER — CARE COORDINATION (OUTPATIENT)
Dept: CASE MANAGEMENT | Age: 70
End: 2020-12-30

## 2020-12-30 ENCOUNTER — ANTI-COAG VISIT (OUTPATIENT)
Dept: FAMILY MEDICINE CLINIC | Age: 70
End: 2020-12-30

## 2020-12-30 ENCOUNTER — TELEPHONE (OUTPATIENT)
Dept: FAMILY MEDICINE CLINIC | Age: 70
End: 2020-12-30

## 2020-12-30 LAB — INR BLD: 1.4

## 2020-12-30 NOTE — CARE COORDINATION
[facility] Transitions Follow Up Call    2020    Patient: Hilario Atkinson  Patient : 1950   MRN: [id number]  Reason for Admission:   Discharge Date: 20 RARS: Readmission Risk Score: 17         Attempted to reach patient by phone for follow up; BPCI-A. HIPAA compliant message left on patient's voicemail; CTN contact information provided.        Ashli Zamudio RN

## 2020-12-30 NOTE — TELEPHONE ENCOUNTER
Patient was in hospital and has ulcer. Is going to  Be starting 2 antibiotics   metronidazole 250mg 4times daily, tetracycline 500mg 4 times daily both for 2 weeks. Patient is having INR checked today and then will discuss warfarin and if there's interactions.

## 2021-01-01 ENCOUNTER — ANTI-COAG VISIT (OUTPATIENT)
Dept: FAMILY MEDICINE CLINIC | Age: 71
End: 2021-01-01

## 2021-01-01 ENCOUNTER — TELEPHONE (OUTPATIENT)
Dept: FAMILY MEDICINE CLINIC | Age: 71
End: 2021-01-01

## 2021-01-01 ENCOUNTER — TELEPHONE (OUTPATIENT)
Dept: CARDIOLOGY CLINIC | Age: 71
End: 2021-01-01

## 2021-01-01 ENCOUNTER — OFFICE VISIT (OUTPATIENT)
Dept: CARDIOLOGY CLINIC | Age: 71
End: 2021-01-01
Payer: MEDICARE

## 2021-01-01 ENCOUNTER — CARE COORDINATION (OUTPATIENT)
Dept: CARE COORDINATION | Age: 71
End: 2021-01-01

## 2021-01-01 ENCOUNTER — OFFICE VISIT (OUTPATIENT)
Dept: FAMILY MEDICINE CLINIC | Age: 71
End: 2021-01-01
Payer: MEDICARE

## 2021-01-01 ENCOUNTER — CARE COORDINATION (OUTPATIENT)
Dept: CASE MANAGEMENT | Age: 71
End: 2021-01-01

## 2021-01-01 ENCOUNTER — OFFICE VISIT (OUTPATIENT)
Dept: SURGERY | Age: 71
End: 2021-01-01
Payer: MEDICARE

## 2021-01-01 ENCOUNTER — HOSPITAL ENCOUNTER (OUTPATIENT)
Age: 71
Discharge: HOME | End: 2021-02-22
Payer: MEDICARE

## 2021-01-01 ENCOUNTER — TELEPHONE (OUTPATIENT)
Dept: BREAST CENTER | Age: 71
End: 2021-01-01

## 2021-01-01 ENCOUNTER — TELEPHONE (OUTPATIENT)
Dept: SURGERY | Age: 71
End: 2021-01-01

## 2021-01-01 VITALS
SYSTOLIC BLOOD PRESSURE: 118 MMHG | RESPIRATION RATE: 16 BRPM | OXYGEN SATURATION: 98 % | TEMPERATURE: 96.2 F | WEIGHT: 149.4 LBS | HEART RATE: 65 BPM | BODY MASS INDEX: 24.01 KG/M2 | HEIGHT: 66 IN | DIASTOLIC BLOOD PRESSURE: 64 MMHG

## 2021-01-01 VITALS
HEART RATE: 80 BPM | WEIGHT: 151 LBS | SYSTOLIC BLOOD PRESSURE: 116 MMHG | BODY MASS INDEX: 25.92 KG/M2 | OXYGEN SATURATION: 98 % | DIASTOLIC BLOOD PRESSURE: 72 MMHG | TEMPERATURE: 96.8 F | RESPIRATION RATE: 14 BRPM

## 2021-01-01 VITALS
RESPIRATION RATE: 14 BRPM | DIASTOLIC BLOOD PRESSURE: 74 MMHG | WEIGHT: 142.9 LBS | BODY MASS INDEX: 23.81 KG/M2 | HEART RATE: 49 BPM | HEIGHT: 65 IN | SYSTOLIC BLOOD PRESSURE: 110 MMHG

## 2021-01-01 VITALS
RESPIRATION RATE: 18 BRPM | BODY MASS INDEX: 24.83 KG/M2 | HEART RATE: 65 BPM | HEIGHT: 65 IN | DIASTOLIC BLOOD PRESSURE: 74 MMHG | WEIGHT: 149 LBS | SYSTOLIC BLOOD PRESSURE: 124 MMHG

## 2021-01-01 VITALS
SYSTOLIC BLOOD PRESSURE: 118 MMHG | BODY MASS INDEX: 23.99 KG/M2 | HEIGHT: 65 IN | RESPIRATION RATE: 16 BRPM | HEART RATE: 117 BPM | TEMPERATURE: 96.8 F | WEIGHT: 144 LBS | OXYGEN SATURATION: 99 % | DIASTOLIC BLOOD PRESSURE: 72 MMHG

## 2021-01-01 DIAGNOSIS — A04.8 H. PYLORI INFECTION: Primary | ICD-10-CM

## 2021-01-01 DIAGNOSIS — A04.8 H. PYLORI INFECTION: ICD-10-CM

## 2021-01-01 DIAGNOSIS — E03.9 ACQUIRED HYPOTHYROIDISM: ICD-10-CM

## 2021-01-01 DIAGNOSIS — Z95.2 H/O MITRAL VALVE REPLACEMENT: ICD-10-CM

## 2021-01-01 DIAGNOSIS — I50.22 CHRONIC SYSTOLIC CONGESTIVE HEART FAILURE (HCC): Primary | ICD-10-CM

## 2021-01-01 DIAGNOSIS — D50.8 OTHER IRON DEFICIENCY ANEMIA: ICD-10-CM

## 2021-01-01 DIAGNOSIS — Z95.2 H/O AORTIC VALVE REPLACEMENT: Primary | ICD-10-CM

## 2021-01-01 DIAGNOSIS — Z95.2 H/O AORTIC VALVE REPLACEMENT: ICD-10-CM

## 2021-01-01 DIAGNOSIS — T45.515A WARFARIN-INDUCED COAGULOPATHY (HCC): ICD-10-CM

## 2021-01-01 DIAGNOSIS — I48.92 ATRIAL FLUTTER WITH RAPID VENTRICULAR RESPONSE (HCC): ICD-10-CM

## 2021-01-01 DIAGNOSIS — D68.32 WARFARIN-INDUCED COAGULOPATHY (HCC): ICD-10-CM

## 2021-01-01 DIAGNOSIS — Z00.00 ROUTINE GENERAL MEDICAL EXAMINATION AT A HEALTH CARE FACILITY: ICD-10-CM

## 2021-01-01 DIAGNOSIS — B02.9 HERPES ZOSTER WITHOUT COMPLICATION: Primary | ICD-10-CM

## 2021-01-01 DIAGNOSIS — D50.8 OTHER IRON DEFICIENCY ANEMIA: Primary | ICD-10-CM

## 2021-01-01 DIAGNOSIS — I50.22 CHRONIC SYSTOLIC CONGESTIVE HEART FAILURE (HCC): ICD-10-CM

## 2021-01-01 DIAGNOSIS — I50.22 SYSTOLIC HEART FAILURE, CHRONIC (HCC): ICD-10-CM

## 2021-01-01 LAB
ALBUMIN SERPL-MCNC: NORMAL G/DL
ALP BLD-CCNC: NORMAL U/L
ALT SERPL-CCNC: NORMAL U/L
ANION GAP SERPL CALCULATED.3IONS-SCNC: NORMAL MMOL/L
ANISOCYTOSIS: ABNORMAL
AST SERPL-CCNC: NORMAL U/L
BASOPHILS ABSOLUTE: 0 E9/L (ref 0–0.2)
BASOPHILS ABSOLUTE: NORMAL
BASOPHILS RELATIVE PERCENT: 0.6 % (ref 0–2)
BASOPHILS RELATIVE PERCENT: NORMAL
BILIRUB SERPL-MCNC: NORMAL MG/DL
BUN BLDV-MCNC: NORMAL MG/DL
BURR CELLS: ABNORMAL
CALCIUM SERPL-MCNC: NORMAL MG/DL
CHLORIDE BLD-SCNC: NORMAL MMOL/L
CO2: NORMAL
CREAT SERPL-MCNC: NORMAL MG/DL
EOSINOPHILS ABSOLUTE: 0 E9/L (ref 0.05–0.5)
EOSINOPHILS ABSOLUTE: NORMAL
EOSINOPHILS RELATIVE PERCENT: 2.4 % (ref 0–6)
EOSINOPHILS RELATIVE PERCENT: NORMAL
GFR CALCULATED: NORMAL
GLUCOSE BLD-MCNC: NORMAL MG/DL
H PYLORI ANTIGEN STOOL: NEGATIVE
HCT VFR BLD CALC: 31.6 % (ref 34–48)
HCT VFR BLD CALC: NORMAL %
HEMOGLOBIN: 8.6 G/DL (ref 11.5–15.5)
HEMOGLOBIN: NORMAL
HYPOCHROMIA: ABNORMAL
INR BLD: 1.3
INR BLD: 1.4
INR BLD: 1.4
INR BLD: 1.5
INR BLD: 1.6
INR BLD: 1.6
INR BLD: 1.7
INR BLD: 1.8
INR BLD: 1.8
INR BLD: 2
INR BLD: 2.1
INR BLD: 2.1
INR BLD: 2.2
INR BLD: 2.3
INR BLD: 2.4
INR BLD: 2.4
INR BLD: 2.6
INR BLD: 2.7
INR BLD: 2.8
INR BLD: 3
INR BLD: 3.4
INR BLD: 3.7
INR BLD: 4.6
INR BLD: 4.6
INR BLD: 4.7
INR BLD: 4.7
INR BLD: 5.1
INR BLD: ABNORMAL
INR BLD: NORMAL
LYMPHOCYTES ABSOLUTE: 0.86 E9/L (ref 1.5–4)
LYMPHOCYTES ABSOLUTE: NORMAL
LYMPHOCYTES RELATIVE PERCENT: 11.4 % (ref 20–42)
LYMPHOCYTES RELATIVE PERCENT: NORMAL
MCH RBC QN AUTO: 19.6 PG (ref 26–35)
MCH RBC QN AUTO: NORMAL PG
MCHC RBC AUTO-ENTMCNC: 27.2 % (ref 32–34.5)
MCHC RBC AUTO-ENTMCNC: NORMAL G/DL
MCV RBC AUTO: 72.1 FL (ref 80–99.9)
MCV RBC AUTO: NORMAL FL
MONOCYTES ABSOLUTE: 0.31 E9/L (ref 0.1–0.95)
MONOCYTES ABSOLUTE: NORMAL
MONOCYTES RELATIVE PERCENT: 4.4 % (ref 2–12)
MONOCYTES RELATIVE PERCENT: NORMAL
NEUTROPHILS ABSOLUTE: 6.55 E9/L (ref 1.8–7.3)
NEUTROPHILS ABSOLUTE: NORMAL
NEUTROPHILS RELATIVE PERCENT: 84.2 % (ref 43–80)
NEUTROPHILS RELATIVE PERCENT: NORMAL
OVALOCYTES: ABNORMAL
PDW BLD-RTO: 18.9 FL (ref 11.5–15)
PLATELET # BLD: 223 E9/L (ref 130–450)
PLATELET # BLD: NORMAL 10*3/UL
PMV BLD AUTO: 11.2 FL (ref 7–12)
PMV BLD AUTO: NORMAL FL
POIKILOCYTES: ABNORMAL
POLYCHROMASIA: ABNORMAL
POTASSIUM SERPL-SCNC: NORMAL MMOL/L
PROTIME: 1.3 SECONDS
PROTIME: 46.2 SECONDS
PROTIME: NORMAL
RBC # BLD: 4.38 E12/L (ref 3.5–5.5)
RBC # BLD: NORMAL 10*6/UL
SODIUM BLD-SCNC: NORMAL MMOL/L
T4 FREE: 1.96 NG/DL (ref 0.93–1.7)
TOTAL PROTEIN: NORMAL
TSH SERPL DL<=0.05 MIU/L-ACNC: 1.59 UIU/ML (ref 0.27–4.2)
WBC # BLD: 7.8 E9/L (ref 4.5–11.5)
WBC # BLD: NORMAL 10*3/UL

## 2021-01-01 PROCEDURE — 3017F COLORECTAL CA SCREEN DOC REV: CPT | Performed by: INTERNAL MEDICINE

## 2021-01-01 PROCEDURE — 1123F ACP DISCUSS/DSCN MKR DOCD: CPT | Performed by: INTERNAL MEDICINE

## 2021-01-01 PROCEDURE — 1036F TOBACCO NON-USER: CPT | Performed by: NURSE PRACTITIONER

## 2021-01-01 PROCEDURE — G8427 DOCREV CUR MEDS BY ELIG CLIN: HCPCS | Performed by: NURSE PRACTITIONER

## 2021-01-01 PROCEDURE — 93000 ELECTROCARDIOGRAM COMPLETE: CPT | Performed by: INTERNAL MEDICINE

## 2021-01-01 PROCEDURE — 4040F PNEUMOC VAC/ADMIN/RCVD: CPT | Performed by: NURSE PRACTITIONER

## 2021-01-01 PROCEDURE — G8427 DOCREV CUR MEDS BY ELIG CLIN: HCPCS | Performed by: INTERNAL MEDICINE

## 2021-01-01 PROCEDURE — 99213 OFFICE O/P EST LOW 20 MIN: CPT | Performed by: NURSE PRACTITIONER

## 2021-01-01 PROCEDURE — G8420 CALC BMI NORM PARAMETERS: HCPCS | Performed by: NURSE PRACTITIONER

## 2021-01-01 PROCEDURE — G8484 FLU IMMUNIZE NO ADMIN: HCPCS | Performed by: SURGERY

## 2021-01-01 PROCEDURE — 4040F PNEUMOC VAC/ADMIN/RCVD: CPT | Performed by: SURGERY

## 2021-01-01 PROCEDURE — 99214 OFFICE O/P EST MOD 30 MIN: CPT | Performed by: INTERNAL MEDICINE

## 2021-01-01 PROCEDURE — G8400 PT W/DXA NO RESULTS DOC: HCPCS | Performed by: SURGERY

## 2021-01-01 PROCEDURE — 1090F PRES/ABSN URINE INCON ASSESS: CPT | Performed by: NURSE PRACTITIONER

## 2021-01-01 PROCEDURE — G8400 PT W/DXA NO RESULTS DOC: HCPCS | Performed by: NURSE PRACTITIONER

## 2021-01-01 PROCEDURE — G8427 DOCREV CUR MEDS BY ELIG CLIN: HCPCS | Performed by: SURGERY

## 2021-01-01 PROCEDURE — 1123F ACP DISCUSS/DSCN MKR DOCD: CPT | Performed by: FAMILY MEDICINE

## 2021-01-01 PROCEDURE — 1123F ACP DISCUSS/DSCN MKR DOCD: CPT | Performed by: NURSE PRACTITIONER

## 2021-01-01 PROCEDURE — 4040F PNEUMOC VAC/ADMIN/RCVD: CPT | Performed by: INTERNAL MEDICINE

## 2021-01-01 PROCEDURE — G8400 PT W/DXA NO RESULTS DOC: HCPCS | Performed by: INTERNAL MEDICINE

## 2021-01-01 PROCEDURE — 1123F ACP DISCUSS/DSCN MKR DOCD: CPT | Performed by: SURGERY

## 2021-01-01 PROCEDURE — 3017F COLORECTAL CA SCREEN DOC REV: CPT | Performed by: NURSE PRACTITIONER

## 2021-01-01 PROCEDURE — 1090F PRES/ABSN URINE INCON ASSESS: CPT | Performed by: INTERNAL MEDICINE

## 2021-01-01 PROCEDURE — 1090F PRES/ABSN URINE INCON ASSESS: CPT | Performed by: SURGERY

## 2021-01-01 PROCEDURE — G8420 CALC BMI NORM PARAMETERS: HCPCS | Performed by: INTERNAL MEDICINE

## 2021-01-01 PROCEDURE — 1036F TOBACCO NON-USER: CPT | Performed by: SURGERY

## 2021-01-01 PROCEDURE — 1036F TOBACCO NON-USER: CPT | Performed by: INTERNAL MEDICINE

## 2021-01-01 PROCEDURE — 3017F COLORECTAL CA SCREEN DOC REV: CPT | Performed by: FAMILY MEDICINE

## 2021-01-01 PROCEDURE — G8417 CALC BMI ABV UP PARAM F/U: HCPCS | Performed by: SURGERY

## 2021-01-01 PROCEDURE — G0439 PPPS, SUBSEQ VISIT: HCPCS | Performed by: FAMILY MEDICINE

## 2021-01-01 PROCEDURE — 99212 OFFICE O/P EST SF 10 MIN: CPT | Performed by: SURGERY

## 2021-01-01 PROCEDURE — 3017F COLORECTAL CA SCREEN DOC REV: CPT | Performed by: SURGERY

## 2021-01-01 PROCEDURE — 87338 HPYLORI STOOL AG IA: CPT

## 2021-01-01 PROCEDURE — G8484 FLU IMMUNIZE NO ADMIN: HCPCS | Performed by: INTERNAL MEDICINE

## 2021-01-01 PROCEDURE — 4040F PNEUMOC VAC/ADMIN/RCVD: CPT | Performed by: FAMILY MEDICINE

## 2021-01-01 PROCEDURE — G8484 FLU IMMUNIZE NO ADMIN: HCPCS | Performed by: FAMILY MEDICINE

## 2021-01-01 RX ORDER — LEVOTHYROXINE SODIUM 100 MCG
100 TABLET ORAL DAILY
Qty: 30 TABLET | Refills: 2 | Status: SHIPPED
Start: 2021-01-01 | End: 2021-01-01

## 2021-01-01 RX ORDER — METHYLPREDNISOLONE 4 MG/1
TABLET ORAL
Qty: 1 KIT | Refills: 0 | Status: SHIPPED | OUTPATIENT
Start: 2021-01-01

## 2021-01-01 RX ORDER — PANTOPRAZOLE SODIUM 20 MG/1
TABLET, DELAYED RELEASE ORAL
Qty: 90 TABLET | Refills: 0 | Status: SHIPPED
Start: 2021-01-01 | End: 2022-01-01

## 2021-01-01 RX ORDER — METOPROLOL SUCCINATE 50 MG/1
50 TABLET, EXTENDED RELEASE ORAL DAILY
Qty: 90 TABLET | Refills: 3 | Status: SHIPPED | OUTPATIENT
Start: 2021-01-01

## 2021-01-01 RX ORDER — WARFARIN SODIUM 10 MG/1
TABLET ORAL
Qty: 90 TABLET | Refills: 0 | Status: SHIPPED | OUTPATIENT
Start: 2021-01-01

## 2021-01-01 RX ORDER — PANTOPRAZOLE SODIUM 40 MG/1
40 TABLET, DELAYED RELEASE ORAL DAILY
Qty: 90 TABLET | Refills: 1 | Status: SHIPPED | OUTPATIENT
Start: 2021-01-01

## 2021-01-01 RX ORDER — CHOLECALCIFEROL (VITAMIN D3) 50 MCG
2000 TABLET ORAL PRN
COMMUNITY

## 2021-01-01 RX ORDER — SACUBITRIL AND VALSARTAN 97; 103 MG/1; MG/1
0.5 TABLET, FILM COATED ORAL 2 TIMES DAILY
Qty: 30 TABLET | Refills: 0 | Status: SHIPPED | OUTPATIENT
Start: 2021-01-01

## 2021-01-01 RX ORDER — LEVOTHYROXINE SODIUM 100 MCG
TABLET ORAL
Qty: 30 TABLET | Refills: 3 | Status: SHIPPED | OUTPATIENT
Start: 2021-01-01

## 2021-01-01 RX ORDER — PANTOPRAZOLE SODIUM 20 MG/1
40 TABLET, DELAYED RELEASE ORAL DAILY
Qty: 30 TABLET | Refills: 3 | Status: SHIPPED
Start: 2021-01-01 | End: 2021-01-01

## 2021-01-01 RX ORDER — BUMETANIDE 1 MG/1
1 TABLET ORAL DAILY
Qty: 30 TABLET | Refills: 5 | Status: SHIPPED | OUTPATIENT
Start: 2021-01-01

## 2021-01-01 RX ORDER — DIGOXIN 125 MCG
125 TABLET ORAL DAILY
Qty: 90 TABLET | Refills: 3 | Status: SHIPPED | OUTPATIENT
Start: 2021-01-01

## 2021-01-01 RX ORDER — LEVOTHYROXINE SODIUM 100 MCG
TABLET ORAL
Qty: 30 TABLET | Refills: 0 | Status: SHIPPED
Start: 2021-01-01 | End: 2021-01-01

## 2021-01-01 RX ORDER — WARFARIN SODIUM 10 MG/1
10 TABLET ORAL
COMMUNITY
End: 2021-01-01

## 2021-01-01 RX ORDER — LEVOTHYROXINE SODIUM 100 MCG
TABLET ORAL
Qty: 30 TABLET | Refills: 3 | Status: SHIPPED
Start: 2021-01-01 | End: 2021-01-01

## 2021-01-01 RX ORDER — ACYCLOVIR 800 MG/1
800 TABLET ORAL
Qty: 35 TABLET | Refills: 0 | Status: SHIPPED | OUTPATIENT
Start: 2021-01-01 | End: 2021-01-01

## 2021-01-01 SDOH — ECONOMIC STABILITY: FOOD INSECURITY: WITHIN THE PAST 12 MONTHS, YOU WORRIED THAT YOUR FOOD WOULD RUN OUT BEFORE YOU GOT MONEY TO BUY MORE.: NEVER TRUE

## 2021-01-01 SDOH — ECONOMIC STABILITY: FOOD INSECURITY: WITHIN THE PAST 12 MONTHS, THE FOOD YOU BOUGHT JUST DIDN'T LAST AND YOU DIDN'T HAVE MONEY TO GET MORE.: NEVER TRUE

## 2021-01-01 ASSESSMENT — LIFESTYLE VARIABLES
HOW OFTEN DURING THE LAST YEAR HAVE YOU BEEN UNABLE TO REMEMBER WHAT HAPPENED THE NIGHT BEFORE BECAUSE YOU HAD BEEN DRINKING: 0
HOW OFTEN DURING THE LAST YEAR HAVE YOU FAILED TO DO WHAT WAS NORMALLY EXPECTED FROM YOU BECAUSE OF DRINKING: 0
HOW OFTEN DURING THE LAST YEAR HAVE YOU NEEDED AN ALCOHOLIC DRINK FIRST THING IN THE MORNING TO GET YOURSELF GOING AFTER A NIGHT OF HEAVY DRINKING: NEVER
HOW OFTEN DURING THE LAST YEAR HAVE YOU FAILED TO DO WHAT WAS NORMALLY EXPECTED FROM YOU BECAUSE OF DRINKING: NEVER
HOW OFTEN DURING THE LAST YEAR HAVE YOU HAD A FEELING OF GUILT OR REMORSE AFTER DRINKING: 0
HOW OFTEN DURING THE LAST YEAR HAVE YOU FOUND THAT YOU WERE NOT ABLE TO STOP DRINKING ONCE YOU HAD STARTED: 0
HOW OFTEN DURING THE LAST YEAR HAVE YOU FOUND THAT YOU WERE NOT ABLE TO STOP DRINKING ONCE YOU HAD STARTED: NEVER
HAVE YOU OR SOMEONE ELSE BEEN INJURED AS A RESULT OF YOUR DRINKING: NO
HOW OFTEN DO YOU HAVE A DRINK CONTAINING ALCOHOL: MONTHLY OR LESS
HOW MANY STANDARD DRINKS CONTAINING ALCOHOL DO YOU HAVE ON A TYPICAL DAY: ONE OR TWO
HAVE YOU OR SOMEONE ELSE BEEN INJURED AS A RESULT OF YOUR DRINKING: 0
AUDIT TOTAL SCORE: 0
HOW OFTEN DO YOU HAVE SIX OR MORE DRINKS ON ONE OCCASION: NEVER
AUDIT TOTAL SCORE: 1
HAS A RELATIVE, FRIEND, DOCTOR, OR ANOTHER HEALTH PROFESSIONAL EXPRESSED CONCERN ABOUT YOUR DRINKING OR SUGGESTED YOU CUT DOWN: NO
HOW OFTEN DO YOU HAVE SIX OR MORE DRINKS ON ONE OCCASION: 0
AUDIT-C TOTAL SCORE: 0
HOW OFTEN DURING THE LAST YEAR HAVE YOU BEEN UNABLE TO REMEMBER WHAT HAPPENED THE NIGHT BEFORE BECAUSE YOU HAD BEEN DRINKING: NEVER
HOW OFTEN DURING THE LAST YEAR HAVE YOU HAD A FEELING OF GUILT OR REMORSE AFTER DRINKING: NEVER

## 2021-01-01 ASSESSMENT — PATIENT HEALTH QUESTIONNAIRE - PHQ9
SUM OF ALL RESPONSES TO PHQ QUESTIONS 1-9: 2
SUM OF ALL RESPONSES TO PHQ QUESTIONS 1-9: 2
2. FEELING DOWN, DEPRESSED OR HOPELESS: 1
SUM OF ALL RESPONSES TO PHQ QUESTIONS 1-9: 2

## 2021-01-01 ASSESSMENT — ENCOUNTER SYMPTOMS
SHORTNESS OF BREATH: 0
WHEEZING: 0
CONSTIPATION: 0
VOMITING: 0
COUGH: 0
NAUSEA: 0
DIARRHEA: 0

## 2021-01-01 ASSESSMENT — SOCIAL DETERMINANTS OF HEALTH (SDOH): HOW HARD IS IT FOR YOU TO PAY FOR THE VERY BASICS LIKE FOOD, HOUSING, MEDICAL CARE, AND HEATING?: NOT HARD AT ALL

## 2021-01-06 LAB — INR BLD: 2.2

## 2021-01-07 ENCOUNTER — OFFICE VISIT (OUTPATIENT)
Dept: CARDIOLOGY CLINIC | Age: 71
End: 2021-01-07
Payer: MEDICARE

## 2021-01-07 VITALS
DIASTOLIC BLOOD PRESSURE: 60 MMHG | WEIGHT: 153.3 LBS | HEIGHT: 64 IN | BODY MASS INDEX: 26.17 KG/M2 | SYSTOLIC BLOOD PRESSURE: 120 MMHG | HEART RATE: 101 BPM | RESPIRATION RATE: 20 BRPM

## 2021-01-07 DIAGNOSIS — I50.22 CHRONIC SYSTOLIC CONGESTIVE HEART FAILURE (HCC): Primary | ICD-10-CM

## 2021-01-07 PROCEDURE — 1036F TOBACCO NON-USER: CPT | Performed by: INTERNAL MEDICINE

## 2021-01-07 PROCEDURE — G8400 PT W/DXA NO RESULTS DOC: HCPCS | Performed by: INTERNAL MEDICINE

## 2021-01-07 PROCEDURE — 1090F PRES/ABSN URINE INCON ASSESS: CPT | Performed by: INTERNAL MEDICINE

## 2021-01-07 PROCEDURE — 93000 ELECTROCARDIOGRAM COMPLETE: CPT | Performed by: INTERNAL MEDICINE

## 2021-01-07 PROCEDURE — G8484 FLU IMMUNIZE NO ADMIN: HCPCS | Performed by: INTERNAL MEDICINE

## 2021-01-07 PROCEDURE — 3017F COLORECTAL CA SCREEN DOC REV: CPT | Performed by: INTERNAL MEDICINE

## 2021-01-07 PROCEDURE — 1123F ACP DISCUSS/DSCN MKR DOCD: CPT | Performed by: INTERNAL MEDICINE

## 2021-01-07 PROCEDURE — 99214 OFFICE O/P EST MOD 30 MIN: CPT | Performed by: INTERNAL MEDICINE

## 2021-01-07 PROCEDURE — G8417 CALC BMI ABV UP PARAM F/U: HCPCS | Performed by: INTERNAL MEDICINE

## 2021-01-07 PROCEDURE — G8427 DOCREV CUR MEDS BY ELIG CLIN: HCPCS | Performed by: INTERNAL MEDICINE

## 2021-01-07 PROCEDURE — 4040F PNEUMOC VAC/ADMIN/RCVD: CPT | Performed by: INTERNAL MEDICINE

## 2021-01-07 NOTE — PROGRESS NOTES
CHIEF COMPLAINT: CHF/Afib/AVR-MVR    HISTORY OF PRESENT ILLNESS: Patient is a 69 y.o. female seen at the request of Myron Rojo DO. Patient seen in [facility] [place] after admission for severe anemia. Baseline OCONNOR. No CP.      Past Medical History:   Diagnosis Date    H/O mitral valve replacement     Hypertension     Hypothyroidism     S/P aortic valve replacement        Patient Active Problem List   Diagnosis    Macular hole of right eye    Acquired hypothyroidism    H/O aortic valve replacement    H/O mitral valve replacement    NSTEMI (non-ST elevated myocardial infarction) (HCC)    Atrial flutter with rapid ventricular response (HCC)    Sick sinus syndrome (HCC)    Chronic systolic congestive heart failure (HCC)    Cardiomyopathy (HCC)    Iron deficiency anemia    UGIB (upper gastrointestinal bleed)    Chronic atrial flutter (HCC)    Acute blood loss anemia    Duodenitis    Duodenal ulcer    Warfarin-induced coagulopathy (HCC)    JOSEPH (acute kidney injury) ([facility] [address]    H. pylori infection    Tubular adenoma of colon       No Known Allergies    Current Outpatient Medications   Medication Sig Dispense Refill    bismuth subsalicylate (PEPTO BISMOL) 525 MG/15ML suspension Take 15 mLs by mouth 4 times daily for 14 days 840 mL 0    tetracycline (ACHROMYCIN;SUMYCIN) 500 MG capsule Take 1 capsule by mouth 4 times daily for 14 days 56 capsule 0    metroNIDAZOLE (FLAGYL) 250 MG tablet Take 1 tablet by mouth 4 times daily for 14 days 56 tablet 0    bumetanide (BUMEX) 1 MG tablet Take 1 tablet by mouth daily 90 tablet 3    digoxin (LANOXIN) 125 MCG tablet Take 1 tablet by mouth daily 90 tablet 3    metoprolol succinate (TOPROL XL) 50 MG extended release tablet Take 1 tablet by mouth daily 90 tablet 3    sacubitril-valsartan (ENTRESTO)  MG per tablet Take 0.5 tablets by mouth 2 times daily 90 tablet 3    warfarin (COUMADIN) 5 MG tablet Take 1 tablet by mouth daily 30 tablet 3    pantoprazole (PROTONIX) 40 MG tablet Take 1 tablet by mouth 2 times daily (before meals) 30 tablet 3    sucralfate (CARAFATE) 1 GM tablet Take 1 tablet by mouth 4 times daily 120 tablet 3    levothyroxine (SYNTHROID) 100 MCG tablet Take 1 tablet by mouth Daily 30 tablet 3     No current facility-administered medications for this visit. Social History     Socioeconomic History    Marital status:      Spouse name: Not on file    Number of children: Not on file    Years of education: Not on file    Highest education level: Not on file   Occupational History    Occupation: retired   Social Needs    Financial resource strain: Not on file    Food insecurity     Worry: Not on file     Inability: Not on file   Cabe na Mala needs     Medical: Not on file     Non-medical: Not on file   Tobacco Use    Smoking status: Former Smoker     Packs/day: 0.10     Years: 45.00     Pack years: 4.50     Types: Cigarettes     Start date:      Quit date:      Years since quittin.0    Smokeless tobacco: Never Used   Substance and Sexual Activity    Alcohol use: Yes     Comment: rare; 2-3 cups coffee daily (decaf)    Drug use: No    Sexual activity: Yes     Partners: Male   Lifestyle    Physical activity     Days per week: Not on file     Minutes per session: Not on file    Stress: Not on file   Relationships    Social connections     Talks on phone: Not on file     Gets together: Not on file     Attends Judaism service: Not on file     Active member of club or organization: Not on file     Attends meetings of clubs or organizations: Not on file     Relationship status: Not on file    Intimate partner violence     Fear of current or ex partner: Not on file     Emotionally abused: Not on file     Physically abused: Not on file     Forced sexual activity: Not on file   Other Topics Concern    Not on file   Social History Narrative    Not on file       History reviewed.  No pertinent family history. Review of Systems:  Heart: as above   Lungs: as above   Eyes: denies changes in vision or discharge. Ears: denies changes in hearing or pain. Nose: denies epistaxis or masses   Throat: denies sore throat or trouble swallowing. Neuro: denies numbness, tingling, tremors. Skin: denies rashes or itching. : denies hematuria, dysuria   GI: denies vomiting, diarrhea   Psych: denies mood changed, anxiety, depression. All other systems negative. Physical Exam   /60   Pulse 101   Resp 20   Ht 5' 4\" (1.626 m)   Wt 153 lb 4.8 oz (69.5 kg)   BMI 26.31 kg/m²   Constitutional: Oriented to person, place, and time. Well-developed and well-nourished. No distress. Head: Normocephalic and atraumatic. Eyes: EOM are normal. Pupils are equal, round, and reactive to light. Neck: Normal range of motion. Neck supple. No hepatojugular reflux and no JVD present. Carotid bruit is not present. No tracheal deviation present. No thyromegaly present. Cardiovascular: Normal rate, regular rhythm, normal heart sounds and intact distal pulses. Exam reveals no gallop and no friction rub. No murmur heard. Pulmonary/Chest: Effort normal and breath sounds normal. No respiratory distress. No wheezes. No rales. No tenderness. Abdominal: Soft. Bowel sounds are normal. No distension and no mass. No tenderness. No rebound and no guarding. Musculoskeletal: Normal range of motion. No edema and no tenderness. Lymphadenopathy:   No cervical adenopathy. No groin adenopathy. Neurological: Alert and oriented to person, place, and time. Skin: Skin is warm and dry. No rash noted. Not diaphoretic. No erythema. Psychiatric: Normal mood and affect. Behavior is normal.     EKG: Atrial flutter, nonspecific ST and T waves changes.     ASSESSMENT AND PLAN:  Patient Active Problem List   Diagnosis    Macular hole of right eye    Acquired hypothyroidism    H/O aortic valve replacement    H/O mitral valve replacement    NSTEMI (non-ST elevated myocardial infarction) ([facility] [address]    Atrial flutter with rapid ventricular response (HCC)    Sick sinus syndrome (HCC)    Chronic systolic congestive heart failure (HCC)    Cardiomyopathy (HCC)    Iron deficiency anemia    UGIB (upper gastrointestinal bleed)    Chronic atrial flutter (HCC)    Acute blood loss anemia    Duodenitis    Duodenal ulcer    Warfarin-induced coagulopathy (HCC)    JOSEPH (acute kidney injury) (HCC)    H. pylori infection    Tubular adenoma of colon     1. GIB/Severe anemia/Ulcer: Per surgery. 2. Chronic systolic HF:     Restart medications. 3. Chronic Aflutter:     Titrate rate control medications. Warfarin. 4. History of mechanical aortic and mitral valve replacements:     Warfarin. 5. Elevated troponin: Stress normal perfusion 5/19/2020.    6. HTN: Observe. Donald Pereira D.O.   Cardiologist  Cardiology, [facility]

## 2021-01-08 ENCOUNTER — ANTI-COAG VISIT (OUTPATIENT)
Dept: FAMILY MEDICINE CLINIC | Age: 71
End: 2021-01-08

## 2021-01-13 ENCOUNTER — CARE COORDINATION (OUTPATIENT)
Dept: CASE MANAGEMENT | Age: 71
End: 2021-01-13

## 2021-01-13 NOTE — CARE COORDINATION
[facility] Transitions Follow Up Call    2021    Patient: Abdelrahman Mckinley  Patient : 1950   MRN: <[id number]>  Reason for Admission:   Discharge Date: 20 RARS: Readmission Risk Score: 17      Follow Up: Attempted to contact patient for BPCI-A follow up. Unable to reach patient. Unable to leave a message. Voice mailbox has not been set up. Will try again at a later time.       Future Appointments   Date Time Provider Ai Sanchez   2021  9:30 AM Chaka Haines MD [facility]       Tim Dykes RN

## 2021-01-18 NOTE — TELEPHONE ENCOUNTER
Pt wants to know if you feel it is OK for her to get the covid vaccine with her recent medical history?

## 2021-01-25 NOTE — PATIENT INSTRUCTIONS
Stop Carafate completely. Stop protonix x 2 weeks and then resume daily. Any questions or concerns, please contact my medical assistant Vida at [phone].

## 2021-01-25 NOTE — PROGRESS NOTES
[facility] SURGICAL ASSOCIATES/[facility]  PROGRESS NOTE  ATTENDING NOTE    Chief Complaint   Patient presents with    Follow Up After Procedure     Patient here for 1 month follow up. Denies any problems or concerns at this time. Doing well. No issues taking antibiotics for H pylori. I discussed the process for f/u H pylori infection and went over EGD vs. Stool for H pylori. We agreed stool for H pylori is sufficient. I advised her to stop her Carafate completely. I advised her to hold the protonix x 2 weeks and then give the stool sample. She may go back on the protonix after that daily. I will call with results. If still positive, will need another round of antibiotics. Clancy Burkitt, MD, MSc, FACS  1/25/2021  4:49 PM    I spent 15 minutes personally with the patient/family/staff/resident(s) in examination, chart and imaging review, discussing natural history and prognosis, differential diagnosis, risks and benefits of treatment and instructions of which more than 50% of the time was spent for counseling and coordinating care.

## 2021-02-17 NOTE — TELEPHONE ENCOUNTER
Did not receive an INR from [facility] last week. Called mobile phone, no answer. VM not set up to leave [facility]. Called home phone, no answer. Left message.

## 2021-02-17 NOTE — TELEPHONE ENCOUNTER
I called [facility] to see if maybe she tested and they didn't fax the result over, they reports she did present today to test her INR (result still pending). However they state she did not test last week.

## 2021-02-18 NOTE — CARE COORDINATION
[facility] Transitions Follow Up Call    2021    Patient: Uriah Garza  Patient : 1950   MRN: <[id number]>  Reason for Admission:   Discharge Date: 20 RARS: Readmission Risk Score: 17    Follow Up: Attempted to contact patient for BPCI-A follow up. Unable to reach patient. Unable to leave a message. Voice mailbox has not been set up. Will try again at a later time. No future appointments.     Enrique Villeda RN

## 2021-02-26 NOTE — TELEPHONE ENCOUNTER
I called Tavo Medley with her results. She is H pylori negative. Ok to resume protonix 40mg daily--prescription escribed. No need for her to f/u with me at this time.

## 2021-02-27 NOTE — ANESTHESIA POSTPROCEDURE EVALUATION
Department of Anesthesiology  Postprocedure Note    Patient: Anival Montoya  MRN: [id number]  YOB: 1950  Date of evaluation: 12/3/2020  Time:  11:56 AM     Procedure Summary     Date:  12/03/20 Room / Location:  [address] [place] [address] [place] [facility]    Anesthesia Start:  5393 Anesthesia Stop:      Procedure:  EGD DIAGNOSTIC ONLY (N/A ) Diagnosis:  (GI BLEED)    Surgeon:  Art Roman MD Responsible Provider:  Batsheva Machado MD    Anesthesia Type:  MAC ASA Status:  3          Anesthesia Type: No value filed. Gracia Phase I: Gracia Score: 9    Gracia Phase II:      Last vitals: Reviewed and per EMR flowsheets.        Anesthesia Post Evaluation    Patient location during evaluation: PACU  Patient participation: complete - patient participated  Level of consciousness: awake  Pain score: 3  Airway patency: patent  Nausea & Vomiting: no nausea and no vomiting  Complications: no  Cardiovascular status: blood pressure returned to baseline  Respiratory status: acceptable  Hydration status: euvolemic Patient verbalized understanding to discharge instructions and follow up care. Ambulatory out of ED with steady gait.

## 2021-03-05 NOTE — CARE COORDINATION
[facility] Transitions Follow Up Call    3/5/2021    Patient: France Smith  Patient : 1950   MRN: [id number]  Reason for Admission:   Discharge Date: 20 RARS: Readmission Risk Score: 17    Attempted to reach patient by phone regarding follow up; BPCI-A. HIPAA compliant message left on patient's voicemail; CTN contact information provided. Please follow up with your PCP for any immediate needs or concerns. Final call; no further outreach.         Esme Sarkar RN

## 2021-03-16 NOTE — CARE COORDINATION
Attempted to reach patient by telephone. Voice Mail not set up, unable to leave message. Will attempt to reach patient again.

## 2021-03-17 NOTE — PATIENT INSTRUCTIONS
Personalized Preventive Plan for Endy Whitlock - 3/17/2021  Medicare offers a range of preventive health benefits. Some of the tests and screenings are paid in full while other may be subject to a deductible, co-insurance, and/or copay. Some of these benefits include a comprehensive review of your medical history including lifestyle, illnesses that may run in your family, and various assessments and screenings as appropriate. After reviewing your medical record and screening and assessments performed today your provider may have ordered immunizations, labs, imaging, and/or referrals for you. A list of these orders (if applicable) as well as your Preventive Care list are included within your After Visit Summary for your review. Other Preventive Recommendations:    · A preventive eye exam performed by an eye specialist is recommended every 1-2 years to screen for glaucoma; cataracts, macular degeneration, and other eye disorders. · A preventive dental visit is recommended every 6 months. · Try to get at least 150 minutes of exercise per week or 10,000 steps per day on a pedometer . · Order or download the FREE \"Exercise & Physical Activity: Your Everyday Guide\" from [employer]. Call 8-353.240.7108 or search [employer] online. · You need 8232-8174 mg of calcium and 4306-9666 IU of vitamin D per day. It is possible to meet your calcium requirement with diet alone, but a vitamin D supplement is usually necessary to meet this goal.  · When exposed to the sun, use a sunscreen that protects against both UVA and UVB radiation with an SPF of 30 or greater. Reapply every 2 to 3 hours or after sweating, drying off with a towel, or swimming. · Always wear a seat belt when traveling in a car. Always wear a helmet when riding a bicycle or motorcycle.

## 2021-03-17 NOTE — PROGRESS NOTES
Medicare Annual Wellness Visit  Name: Franca Kim Date: 3/17/2021   MRN: [id number] Sex: Female   Age: 66 y.o. Ethnicity: Non-/Non    : 1950 Race: Joan Kim is here for Medicare AWV    Screenings for behavioral, psychosocial and functional/safety risks, and cognitive dysfunction are all negative except as indicated below. These results, as well as other patient data from the [address] form, are documented in Flowsheets linked to this Encounter. No Known Allergies    Prior to Visit Medications    Medication Sig Taking? Authorizing Provider   pantoprazole (PROTONIX) 20 MG tablet Take 2 tablets by mouth daily  Alicia Lipscomb MD   SYNTHROID 100 MCG tablet TAKE ONE TABLET BY MOUTH EVERY DAY. PATIENT NEEDS APPOINTMENT   GLADYS Krueger - CNP   bumetanide (BUMEX) 1 MG tablet Take 1 tablet by mouth daily  Kristin Body, DO   digoxin (LANOXIN) 125 MCG tablet Take 1 tablet by mouth daily  Kristin Body, DO   metoprolol succinate (TOPROL XL) 50 MG extended release tablet Take 1 tablet by mouth daily  Kristin Body, DO   sacubitril-valsartan (ENTRESTO)  MG per tablet Take 0.5 tablets by mouth 2 times daily  Kristin Body, DO   warfarin (COUMADIN) 5 MG tablet Take 1 tablet by mouth daily  Lorelei Dubose MD   sucralfate (CARAFATE) 1 GM tablet Take 1 tablet by mouth 4 times daily  Patient not taking: Reported on 3/17/2021  Lorelei Dubose MD       Past Medical History:   Diagnosis Date    H/O mitral valve replacement     Hypertension     Hypothyroidism     S/P aortic valve replacement        Past Surgical History:   Procedure Laterality Date    CARDIAC VALVE REPLACEMENT      twice-1. MV 20 years ago 2.  AV 1.5 years after (20)    CARDIOVERSION  2020    COLONOSCOPY N/A 2020    COLONOSCOPY POLYPECTOMY SNARE/COLD BIOPSY performed by Alicia Lipscomb MD at [address] COLONOSCOPY  2020    COLONOSCOPY CONTROL HEMORRHAGE performed by Beth Davis MD at [address] [facility] ECHO COMPL W DOP COLOR FLOW  1/25/2012         TRANSESOPHAGEAL ECHOCARDIOGRAM  05/18/2020    UPPER GASTROINTESTINAL ENDOSCOPY N/A 12/3/2020    EGD DIAGNOSTIC ONLY performed by Beth Davis MD at [address] [facility]       No family history on file. CareTeam (Including outside providers/suppliers regularly involved in providing care):   Patient Care Team:  Gabriela Clark DO as PCP - General (Family Medicine)  Gabriela Clark DO as PCP - [facility]  GLADYS Cutler - CNP (Nurse Practitioner)  Calvin Alvarado RN as [facility]    Wt Readings from Last 3 Encounters:   03/17/21 149 lb 6.4 oz (67.8 kg)   01/25/21 151 lb (68.5 kg)   01/07/21 153 lb 4.8 oz (69.5 kg)     Vitals:    03/17/21 1444   BP: 118/64   Pulse: 65   Resp: 16   Temp: 96.2 °F (35.7 °C)   TempSrc: Temporal   SpO2: 98%   Weight: 149 lb 6.4 oz (67.8 kg)   Height: 5' 5.5\" (1.664 m)     Body mass index is 24.48 kg/m². Based upon direct observation of the patient, evaluation of cognition reveals recent and remote memory intact.     General Appearance: alert and oriented to person, place and time, well developed and well- nourished, in no acute distress  Skin: warm and dry, no rash or erythema  Head: normocephalic and atraumatic  Eyes: pupils equal, round, and reactive to light, extraocular eye movements intact, conjunctivae normal  ENT: tympanic membrane, external ear and ear canal normal bilaterally, nose without deformity, nasal mucosa and turbinates normal without polyps  Neck: supple and non-tender without mass, no thyromegaly or thyroid nodules, no cervical lymphadenopathy  Pulmonary/Chest: clear to auscultation bilaterally- no wheezes, rales or rhonchi, normal air movement, no respiratory distress  Cardiovascular: normal rate, regular rhythm, normal S1 and S2, no murmurs, rubs, clicks, or gallops, distal pulses intact, no carotid bruits  Abdomen: soft, non-tender, non-distended, normal bowel sounds, no masses or organomegaly  Extremities: no cyanosis, clubbing or edema  Musculoskeletal: normal range of motion, no joint swelling, deformity or tenderness  Neurologic: reflexes normal and symmetric, no cranial nerve deficit, gait, coordination and speech normal  none    Patient's complete Health Risk Assessment and screening values have been reviewed and are found in Flowsheets. The following problems were reviewed today and where indicated follow up appointments were made and/or referrals ordered. Positive Risk Factor Screenings with Interventions:          General Health and ACP:  General  In general, how would you say your health is?: Good  In the past 7 days, have you experienced any of the following?  New or Increased Pain, New or Increased Fatigue, Loneliness, Social Isolation, Stress or Anger?: (!) New or Increased Pain  Do you get the social and emotional support that you need?: Yes  Do you have a Living Will?: (!) No  Advance Directives     Power of  Living Will ACP-Advance Directive ACP-Power of     Not on File Not on File Not on File Not on File      General Health Risk Interventions:  · Fatigue: needed blood transfusion    Health Habits/Nutrition:  Health Habits/Nutrition  Do you exercise for at least 20 minutes 2-3 times per week?: (!) No  Have you lost any weight without trying in the past 3 months?: No  Do you eat only one meal per day?: No  Have you seen the dentist within the past year?: (!) No  Body mass index: 24.48  Health Habits/Nutrition Interventions:  · Inadequate physical activity:  decreased abulation    Hearing/Vision:  No exam data present  Hearing/Vision  Do you or your family notice any trouble with your hearing that hasn't been managed with hearing aids?: No  Do you have difficulty driving, watching TV, or doing any of your daily activities because of your eyesight?: (!) Yes  Have you had an eye exam within the past year?: (!) No  Hearing/Vision Interventions:  · none      Personalized Preventive Plan   Current Health Maintenance Status  Immunization History   Administered Date(s) Administered    COVID-19, Moderna, PF, 100mcg/0.5mL 02/15/2021, 03/15/2021    Influenza, Quadv, adjuvanted, 65 yrs +, IM, PF (Fluad) 10/05/2020    Influenza, Triv, inactivated, subunit, adjuvanted, IM (Fluad 65 yrs and older) 10/13/2018, 10/14/2019    Pneumococcal Conjugate 13-valent ([id number]) 03/09/2018    Pneumococcal Polysaccharide ([id number]) 10/15/2020    Tdap (Boostrix, Adacel) 05/10/2018        Health Maintenance   Topic Date Due    Breast cancer screen  Never done    Shingles Vaccine (1 of 2) Never done    DEXA (modify frequency per FRAX score)  Never done   [employer] Visit (AWV)  Never done    TSH testing  12/02/2021    Potassium monitoring  12/05/2021    Creatinine monitoring  12/05/2021    Lipid screen  05/15/2025    Colon cancer screen colonoscopy  12/04/2025    DTaP/Tdap/Td vaccine (2 - Td) 05/10/2028    Flu vaccine  Completed    Pneumococcal 65+ years Vaccine  Completed    COVID-19 Vaccine  Completed    Hepatitis C screen  Completed    Hepatitis A vaccine  Aged Out    Hepatitis B vaccine  Aged Out    Hib vaccine  Aged Out    Meningococcal (ACWY) vaccine  Aged Out     Recommendations for [employer] Due: see orders and patient instructions/AVS.  .   Recommended screening schedule for the next 5-10 years is provided to the patient in written form: see Patient Instructions/AVS.

## 2021-03-19 NOTE — PROGRESS NOTES
CHIEF COMPLAINT: CHF/Afib/AVR-MVR    HISTORY OF PRESENT ILLNESS: Patient is a 66 y.o. female seen at the request of Moriah Sesay DO. [facility]. Baseline OCONNOR. No CP. Past Medical History:   Diagnosis Date    H/O mitral valve replacement     Hypertension     Hypothyroidism     S/P aortic valve replacement        Patient Active Problem List   Diagnosis    Macular hole of right eye    Acquired hypothyroidism    H/O aortic valve replacement    H/O mitral valve replacement    NSTEMI (non-ST elevated myocardial infarction) (HCC)    Atrial flutter with rapid ventricular response (HCC)    Sick sinus syndrome (HCC)    Chronic systolic congestive heart failure (HCC)    Cardiomyopathy (HCC)    Iron deficiency anemia    UGIB (upper gastrointestinal bleed)    Chronic atrial flutter (HCC)    Acute blood loss anemia    Duodenitis    Duodenal ulcer    Warfarin-induced coagulopathy (HCC)    JOSEPH (acute kidney injury) (HCC)    H. pylori infection    Tubular adenoma of colon       No Known Allergies    Current Outpatient Medications   Medication Sig Dispense Refill    vitamin D (CHOLECALCIFEROL) 50 MCG (2000 UT) TABS tablet Take 2,000 Units by mouth daily      pantoprazole (PROTONIX) 20 MG tablet Take 2 tablets by mouth daily 30 tablet 3    SYNTHROID 100 MCG tablet TAKE ONE TABLET BY MOUTH EVERY DAY.  PATIENT NEEDS APPOINTMENT  30 tablet 0    bumetanide (BUMEX) 1 MG tablet Take 1 tablet by mouth daily 90 tablet 3    digoxin (LANOXIN) 125 MCG tablet Take 1 tablet by mouth daily 90 tablet 3    metoprolol succinate (TOPROL XL) 50 MG extended release tablet Take 1 tablet by mouth daily 90 tablet 3    sacubitril-valsartan (ENTRESTO)  MG per tablet Take 0.5 tablets by mouth 2 times daily 90 tablet 3    warfarin (COUMADIN) 5 MG tablet Take 1 tablet by mouth daily 30 tablet 3    sucralfate (CARAFATE) 1 GM tablet Take 1 tablet by mouth 4 times daily 120 tablet 3     No current facility-administered medications for this visit. Social History     Socioeconomic History    Marital status:      Spouse name: Not on file    Number of children: Not on file    Years of education: Not on file    Highest education level: Not on file   Occupational History    Occupation: retired   Social Needs    Financial resource strain: Not on file    Food insecurity     Worry: Not on file     Inability: Not on file   [employer] needs     Medical: Not on file     Non-medical: Not on file   Tobacco Use    Smoking status: Former Smoker     Packs/day: 0.10     Years: 45.00     Pack years: 4.50     Types: Cigarettes     Start date:      Quit date:      Years since quittin.2    Smokeless tobacco: Never Used   Substance and Sexual Activity    Alcohol use: Yes     Comment: rare; 2-3 cups coffee daily (decaf)    Drug use: No    Sexual activity: Yes     Partners: Male   Lifestyle    Physical activity     Days per week: Not on file     Minutes per session: Not on file    Stress: Not on file   Relationships    Social connections     Talks on phone: Not on file     Gets together: Not on file     Attends [employer] service: Not on file     Active member of club or organization: Not on file     Attends meetings of clubs or organizations: Not on file     Relationship status: Not on file    Intimate partner violence     Fear of current or ex partner: Not on file     Emotionally abused: Not on file     Physically abused: Not on file     Forced sexual activity: Not on file   Other Topics Concern    Not on file   Social History Narrative    Not on file       History reviewed. No pertinent family history. Review of Systems:  Heart: as above   Lungs: as above   Eyes: denies changes in vision or discharge. Ears: denies changes in hearing or pain. Nose: denies epistaxis or masses   Throat: denies sore throat or trouble swallowing.    Neuro: (upper gastrointestinal bleed)    Chronic atrial flutter (HCC)    Acute blood loss anemia    Duodenitis    Duodenal ulcer    Warfarin-induced coagulopathy (HCC)    JOSEPH (acute kidney injury) ([facility] [address]    H. pylori infection    Tubular adenoma of colon     1. GIB/Severe anemia/Ulcer: Per surgery. 2. Chronic systolic HF:     Restart medications. 3. Chronic Aflutter:     Rate controlled. Warfarin. 4. History of mechanical aortic and mitral valve replacements:     Warfarin. 5. Elevated troponin: Stress normal perfusion 5/19/2020.    6. HTN: Observe. Yuliya Canales D.O.   Cardiologist  Cardiology, [facility]

## 2021-03-23 NOTE — CARE COORDINATION
Attempted to reach patient by telephone for second attempt to offer enrollment in care coordination. Voice mail box not set up, unable to leave message. Will attempt to reach patient again.

## 2021-03-30 NOTE — CARE COORDINATION
Attempted to reach patient by telephone. Unable to reach, reached a recording that the number is not accepting the call. Due to numerous unsuccessful attempts to contact patient, she will be placed on 90 exclusion.

## 2021-04-05 NOTE — TELEPHONE ENCOUNTER
Pt called  Colleen Vidales she has been calling and cnt get a hold of any one. I verified her phone number adv it looks like phone tag. We have been calling back.  On test results adv to double up today and tomorrow and retest 4/8/21 pt stated on

## 2021-06-24 NOTE — PROGRESS NOTES
Re Ruff. She says she will recheck tomorrow morning. She admits to eating some green veggies lately.

## 2021-06-28 NOTE — TELEPHONE ENCOUNTER
Patient states she has been having SOB on exertion , she says she feels winded when going upstairs,she is also noticing  some edema in her left foot and ankle,please advise

## 2021-06-29 NOTE — TELEPHONE ENCOUNTER
Please get a CBC and CMP. Extra dose of bumex today. OV as soon as u can get her in.    Brenda Ware D.O.   Cardiologist  Cardiology, [facility]

## 2021-07-02 NOTE — PROGRESS NOTES
Bibiana tested and resulted yesterday BEFORE we closed however they did not send this result until this morning. Her INR is out of range. Josep Kaur has been asked multiple times that if she doesn't hear from us about her INR that she needs to call us because sometimes the lab reports late.

## 2021-07-16 NOTE — PROGRESS NOTES
CHIEF COMPLAINT: CHF/Afib/AVR-MVR    HISTORY OF PRESENT ILLNESS: Patient is a 66 y.o. female seen at the request of Osei Peters DO. Patient seen in FU. Baseline OCONNOR. No CP. Past Medical History:   Diagnosis Date    H/O mitral valve replacement     Hypertension     Hypothyroidism     S/P aortic valve replacement        Patient Active Problem List   Diagnosis    Macular hole of right eye    Acquired hypothyroidism    H/O aortic valve replacement    H/O mitral valve replacement    NSTEMI (non-ST elevated myocardial infarction) (HCC)    Atrial flutter with rapid ventricular response (HCC)    Sick sinus syndrome (HCC)    Chronic systolic congestive heart failure (HCC)    Cardiomyopathy (HCC)    Iron deficiency anemia    UGIB (upper gastrointestinal bleed)    Chronic atrial flutter (HCC)    Acute blood loss anemia    Duodenitis    Duodenal ulcer    Warfarin-induced coagulopathy (HCC)    JOSEPH (acute kidney injury) (HCC)    H. pylori infection    Tubular adenoma of colon       No Known Allergies    Current Outpatient Medications   Medication Sig Dispense Refill    warfarin (COUMADIN) 10 MG tablet Take 10 mg by mouth Twice a Week Pt takes 2 mg twice weekly as Directed.       digoxin (LANOXIN) 125 MCG tablet Take 1 tablet by mouth daily 90 tablet 3    SYNTHROID 100 MCG tablet TAKE ONE TABLET BY MOUTH EVERY DAY 30 tablet 3    pantoprazole (PROTONIX) 20 MG tablet TAKE TWO TABLETS BY MOUTH EVERY DAY 90 tablet 3    vitamin D (CHOLECALCIFEROL) 50 MCG (2000 UT) TABS tablet Take 2,000 Units by mouth as needed       bumetanide (BUMEX) 1 MG tablet Take 1 tablet by mouth daily 90 tablet 3    metoprolol succinate (TOPROL XL) 50 MG extended release tablet Take 1 tablet by mouth daily 90 tablet 3    sacubitril-valsartan (ENTRESTO)  MG per tablet Take 0.5 tablets by mouth 2 times daily 90 tablet 3    warfarin (COUMADIN) 5 MG tablet Take 1 tablet by mouth daily 30 tablet 3 No current facility-administered medications for this visit. Social History     Socioeconomic History    Marital status:      Spouse name: Not on file    Number of children: Not on file    Years of education: Not on file    Highest education level: Not on file   Occupational History    Occupation: retired   Tobacco Use    Smoking status: Former Smoker     Packs/day: 0.10     Years: 45.00     Pack years: 4.50     Types: Cigarettes     Start date:      Quit date: 2019     Years since quittin.5    Smokeless tobacco: Never Used   Vaping Use    Vaping Use: Never used   Substance and Sexual Activity    Alcohol use: Yes     Comment: rare; 2-3 cups coffee daily (decaf)    Drug use: No    Sexual activity: Yes     Partners: Male   Other Topics Concern    Not on file   Social History Narrative    Not on file     Social Determinants of Health     Financial Resource Strain:     Difficulty of Paying Living Expenses:    Food Insecurity:     Worried About [address] in the Last Year:     [address] [employer] in the Last Year:    Transportation Needs:     Lack of Transportation (Medical):  Lack of Transportation (Non-Medical):    Physical Activity:     Days of Exercise per Week:     Minutes of Exercise per Session:    Stress:     Feeling of Stress :    Social Connections:     Frequency of Communication with Friends and Family:     Frequency of Social Gatherings with Friends and Family:     Attends Mu-[employer] Services:     Active Member of Clubs or Organizations:     Attends Club or Organization Meetings:     Marital Status:    Intimate Partner Violence:     Fear of Current or Ex-Partner:     Emotionally Abused:     Physically Abused:     Sexually Abused:        History reviewed. No pertinent family history. Review of Systems:  Heart: as above   Lungs: as above   Eyes: denies changes in vision or discharge. Ears: denies changes in hearing or pain.    Nose: denies epistaxis or masses   Throat: denies sore throat or trouble swallowing. Neuro: denies numbness, tingling, tremors. Skin: denies rashes or itching. : denies hematuria, dysuria   GI: denies vomiting, diarrhea   Psych: denies mood changed, anxiety, depression. All other systems negative. Physical Exam   /74   Pulse (!) 49   Resp 14   Ht 5' 5\" (1.651 m)   Wt 142 lb 14.4 oz (64.8 kg)   BMI 23.78 kg/m²   Constitutional: Oriented to person, place, and time. Well-developed and well-nourished. No distress. Head: Normocephalic and atraumatic. Eyes: EOM are normal. Pupils are equal, round, and reactive to light. Neck: Normal range of motion. Neck supple. No hepatojugular reflux and no JVD present. Carotid bruit is not present. No tracheal deviation present. No thyromegaly present. Cardiovascular: Normal rate, regular rhythm, normal heart sounds and intact distal pulses. Exam reveals no gallop and no friction rub. No murmur heard. Pulmonary/Chest: Effort normal and breath sounds normal. No respiratory distress. No wheezes. No rales. No tenderness. Abdominal: Soft. Bowel sounds are normal. No distension and no mass. No tenderness. No rebound and no guarding. Musculoskeletal: Normal range of motion. No edema and no tenderness. Lymphadenopathy:   No cervical adenopathy. No groin adenopathy. Neurological: Alert and oriented to person, place, and time. Skin: Skin is warm and dry. No rash noted. Not diaphoretic. No erythema. Psychiatric: Normal mood and affect. Behavior is normal.     EKG: Atrial flutter, nonspecific ST and T waves changes.     ASSESSMENT AND PLAN:  Patient Active Problem List   Diagnosis    Macular hole of right eye    Acquired hypothyroidism    H/O aortic valve replacement    H/O mitral valve replacement    NSTEMI (non-ST elevated myocardial infarction) (HCC)    Atrial flutter with rapid ventricular response (HCC)    Sick sinus syndrome (HCC)    Chronic systolic

## 2021-08-12 NOTE — PROGRESS NOTES
Avery Motley (:  1950) is a 66 y.o. female,Established patient, here for evaluation of the following chief complaint(s):  Rash (Has rash and blisters on right side of chest and back around shoulder blade. not on face. irritation to touch. is having itching and burning \"needle feeling\". concern of contagious with young kids around her)         ASSESSMENT/PLAN:  1. Herpes zoster without complication  -     acyclovir (ZOVIRAX) 800 MG tablet; Take 1 tablet by mouth 5 times daily for 7 days, Disp-35 tablet, R-0Normal  -     methylPREDNISolone (MEDROL, EDUARDA,) 4 MG tablet; Take by mouth as directed, Disp-1 kit, R-0Normal  - Reviewed side effects of medication and patient verbalizes understanding.   - Advised to call back directly if there are further questions, or if these symptoms fail to improve as anticipated or worsen. - Discussed concern for being around young grandchildren, one of which is too young to have been vaccinated yet for Varicella    2. Warfarin-induced coagulopathy (HCC)  -  Stable    3. H/O aortic valve replacement  4. H/O mitral valve replacement  5. Atrial flutter with rapid ventricular response (HCC)  -  Continue to follow with cardiology  -  Digoxin was recently decreased for a low heart rate by cardiology  -  Patient with irregular heart rate today and , discussed that if she experiences SOB, dizziness or other related symptoms she is to proceed to ED for further evaluation  -  Patient verbalized understanding     Return if symptoms worsen or fail to improve. Subjective   SUBJECTIVE/OBJECTIVE:  HPI  Here today for rash to right front chest and right side of upper back. Review of Systems   Constitutional: Negative for activity change, appetite change, chills, diaphoresis and fever. Respiratory: Negative for cough, shortness of breath and wheezing. Cardiovascular: Negative for chest pain, palpitations and leg swelling.    Gastrointestinal: Negative for constipation, diarrhea, nausea and vomiting. Genitourinary: Negative for difficulty urinating. Skin: Positive for rash. Neurological: Negative for dizziness, weakness and headaches. Objective   Physical Exam  Constitutional:       Appearance: She is well-developed. HENT:      Head: Normocephalic and atraumatic. Neck:      Thyroid: No thyromegaly. Trachea: No tracheal deviation. Cardiovascular:      Rate and Rhythm: Tachycardia present. Rhythm regularly irregular. Heart sounds: Murmur (consitent with heart valve replacement ) heard. Pulmonary:      Effort: Pulmonary effort is normal.      Breath sounds: Normal breath sounds. Abdominal:      General: Bowel sounds are normal.      Palpations: Abdomen is soft. Tenderness: There is no abdominal tenderness. Musculoskeletal:         General: Normal range of motion. Lymphadenopathy:      Cervical: No cervical adenopathy. Skin:     General: Skin is warm and dry. Findings: Rash (vesicular that starts in right upper chest and wrapas under right arm to her right upper back) present. Neurological:      Mental Status: She is alert and oriented to person, place, and time. Psychiatric:         Behavior: Behavior normal.            [facility]      An electronic signature was used to authenticate this note.     --Tomas Mujica, APRN - CNP

## 2021-08-12 NOTE — PROGRESS NOTES
Dr. Leisa Huntley --- Amando was put on Medrol for shingles today -- pharmacist told her contraindication, can thin blood further -- any changes to the regimen you set today?

## 2021-12-13 NOTE — TELEPHONE ENCOUNTER
Patient called in stating she is out of pantoprazole 20 mg take two tablets po daily. Patient requesting refill sent to [facility] on [facility]. Patient can be reached at [phone]. ma routing message to Dr Bo Rangel for advisement.      Electronically signed by Jesus Stern on 12/13/21 at 10:02 AM EST

## 2022-01-01 ENCOUNTER — OFFICE VISIT (OUTPATIENT)
Dept: CARDIOLOGY CLINIC | Age: 72
End: 2022-01-01
Payer: MEDICARE

## 2022-01-01 ENCOUNTER — HOSPITAL ENCOUNTER (EMERGENCY)
Age: 72
End: 2022-01-18
Attending: STUDENT IN AN ORGANIZED HEALTH CARE EDUCATION/TRAINING PROGRAM
Payer: MEDICARE

## 2022-01-01 ENCOUNTER — ANTI-COAG VISIT (OUTPATIENT)
Dept: FAMILY MEDICINE CLINIC | Age: 72
End: 2022-01-01

## 2022-01-01 VITALS
HEART RATE: 65 BPM | RESPIRATION RATE: 18 BRPM | HEIGHT: 65 IN | WEIGHT: 146.1 LBS | SYSTOLIC BLOOD PRESSURE: 90 MMHG | DIASTOLIC BLOOD PRESSURE: 52 MMHG | BODY MASS INDEX: 24.34 KG/M2

## 2022-01-01 DIAGNOSIS — I48.92 ATRIAL FLUTTER WITH RAPID VENTRICULAR RESPONSE (HCC): ICD-10-CM

## 2022-01-01 DIAGNOSIS — I50.22 CHRONIC SYSTOLIC CONGESTIVE HEART FAILURE (HCC): Primary | ICD-10-CM

## 2022-01-01 DIAGNOSIS — I42.9 CARDIOMYOPATHY, UNSPECIFIED TYPE (HCC): ICD-10-CM

## 2022-01-01 DIAGNOSIS — I48.92 CHRONIC ATRIAL FLUTTER (HCC): ICD-10-CM

## 2022-01-01 DIAGNOSIS — I46.9 CARDIAC ARREST (HCC): Primary | ICD-10-CM

## 2022-01-01 LAB — INR BLD: 3.1

## 2022-01-01 PROCEDURE — G8400 PT W/DXA NO RESULTS DOC: HCPCS | Performed by: INTERNAL MEDICINE

## 2022-01-01 PROCEDURE — G8484 FLU IMMUNIZE NO ADMIN: HCPCS | Performed by: INTERNAL MEDICINE

## 2022-01-01 PROCEDURE — G8427 DOCREV CUR MEDS BY ELIG CLIN: HCPCS | Performed by: INTERNAL MEDICINE

## 2022-01-01 PROCEDURE — 1123F ACP DISCUSS/DSCN MKR DOCD: CPT | Performed by: INTERNAL MEDICINE

## 2022-01-01 PROCEDURE — 99214 OFFICE O/P EST MOD 30 MIN: CPT | Performed by: INTERNAL MEDICINE

## 2022-01-01 PROCEDURE — 1036F TOBACCO NON-USER: CPT | Performed by: INTERNAL MEDICINE

## 2022-01-01 PROCEDURE — 93000 ELECTROCARDIOGRAM COMPLETE: CPT | Performed by: INTERNAL MEDICINE

## 2022-01-01 PROCEDURE — 99283 EMERGENCY DEPT VISIT LOW MDM: CPT

## 2022-01-01 PROCEDURE — 3017F COLORECTAL CA SCREEN DOC REV: CPT | Performed by: INTERNAL MEDICINE

## 2022-01-01 PROCEDURE — 2500000003 HC RX 250 WO HCPCS: Performed by: STUDENT IN AN ORGANIZED HEALTH CARE EDUCATION/TRAINING PROGRAM

## 2022-01-01 PROCEDURE — G8420 CALC BMI NORM PARAMETERS: HCPCS | Performed by: INTERNAL MEDICINE

## 2022-01-01 PROCEDURE — 92950 HEART/LUNG RESUSCITATION CPR: CPT

## 2022-01-01 PROCEDURE — 36556 INSERT NON-TUNNEL CV CATH: CPT

## 2022-01-01 PROCEDURE — 31500 INSERT EMERGENCY AIRWAY: CPT

## 2022-01-01 PROCEDURE — 4040F PNEUMOC VAC/ADMIN/RCVD: CPT | Performed by: INTERNAL MEDICINE

## 2022-01-01 PROCEDURE — 1090F PRES/ABSN URINE INCON ASSESS: CPT | Performed by: INTERNAL MEDICINE

## 2022-01-01 PROCEDURE — 6360000002 HC RX W HCPCS: Performed by: STUDENT IN AN ORGANIZED HEALTH CARE EDUCATION/TRAINING PROGRAM

## 2022-01-01 RX ORDER — CALCIUM CHLORIDE 100 MG/ML
INJECTION INTRAVENOUS; INTRAVENTRICULAR DAILY PRN
Status: COMPLETED | OUTPATIENT
Start: 2022-01-01 | End: 2022-01-01

## 2022-01-01 RX ORDER — NOREPINEPHRINE BITARTRATE 1 MG/ML
INJECTION, SOLUTION INTRAVENOUS DAILY PRN
Status: COMPLETED | OUTPATIENT
Start: 2022-01-01 | End: 2022-01-01

## 2022-01-01 RX ORDER — EPINEPHRINE 1 MG/ML
INJECTION, SOLUTION, CONCENTRATE INTRAVENOUS DAILY PRN
Status: COMPLETED | OUTPATIENT
Start: 2022-01-01 | End: 2022-01-01

## 2022-01-01 RX ORDER — ATROPINE SULFATE 0.1 MG/ML
INJECTION INTRAVENOUS DAILY PRN
Status: COMPLETED | OUTPATIENT
Start: 2022-01-01 | End: 2022-01-01

## 2022-01-01 RX ADMIN — SODIUM BICARBONATE 50 MEQ: 84 INJECTION, SOLUTION INTRAVENOUS at 19:24

## 2022-01-01 RX ADMIN — CALCIUM CHLORIDE 1 G: 100 INJECTION, SOLUTION INTRAVENOUS; INTRAVENTRICULAR at 19:24

## 2022-01-01 RX ADMIN — SODIUM BICARBONATE 50 MEQ: 84 INJECTION, SOLUTION INTRAVENOUS at 19:37

## 2022-01-01 RX ADMIN — ATROPINE SULFATE 0.5 MG: 0.1 INJECTION, SOLUTION ENDOTRACHEAL; INTRAMUSCULAR; INTRAVENOUS; SUBCUTANEOUS at 19:33

## 2022-01-01 RX ADMIN — EPINEPHRINE 1 MG: 1 INJECTION, SOLUTION INTRAMUSCULAR; SUBCUTANEOUS at 19:27

## 2022-01-01 RX ADMIN — ATROPINE SULFATE 0.5 MG: 0.1 INJECTION, SOLUTION ENDOTRACHEAL; INTRAMUSCULAR; INTRAVENOUS; SUBCUTANEOUS at 19:44

## 2022-01-01 RX ADMIN — EPINEPHRINE 1 MG: 1 INJECTION, SOLUTION INTRAMUSCULAR; SUBCUTANEOUS at 19:34

## 2022-01-01 RX ADMIN — CALCIUM CHLORIDE 1 G: 100 INJECTION, SOLUTION INTRAVENOUS; INTRAVENTRICULAR at 19:37

## 2022-01-01 RX ADMIN — EPINEPHRINE 1 MG: 1 INJECTION, SOLUTION INTRAMUSCULAR; SUBCUTANEOUS at 19:23

## 2022-01-01 RX ADMIN — EPINEPHRINE 1 MG: 1 INJECTION, SOLUTION INTRAMUSCULAR; SUBCUTANEOUS at 19:39

## 2022-01-01 RX ADMIN — NOREPINEPHRINE BITARTRATE 20 MCG: 1 INJECTION INTRAVENOUS at 19:44

## 2022-01-01 RX ADMIN — EPINEPHRINE 1 MG: 1 INJECTION, SOLUTION INTRAMUSCULAR; SUBCUTANEOUS at 19:30

## 2022-01-13 NOTE — PROGRESS NOTES
CHIEF COMPLAINT: CHF/Afib/AVR-MVR    HISTORY OF PRESENT ILLNESS: Patient is a 66 y.o. female seen at the request of Hugo Apple DO. Patient seen in FU. Baseline OCONNOR. No CP.      Past Medical History:   Diagnosis Date    H/O mitral valve replacement     Hypertension     Hypothyroidism     S/P aortic valve replacement        Patient Active Problem List   Diagnosis    Macular hole of right eye    Acquired hypothyroidism    H/O aortic valve replacement    H/O mitral valve replacement    NSTEMI (non-ST elevated myocardial infarction) (HCC)    Atrial flutter with rapid ventricular response (HCC)    Sick sinus syndrome (HCC)    Chronic systolic congestive heart failure (HCC)    Cardiomyopathy (HCC)    Iron deficiency anemia    UGIB (upper gastrointestinal bleed)    Chronic atrial flutter (HCC)    Acute blood loss anemia    Duodenitis    Duodenal ulcer    Warfarin-induced coagulopathy (HCC)    JOSEPH [facility]acute [facility]) ([facility] [address]    H. pylori infection    Tubular adenoma of colon       No Known Allergies    Current Outpatient Medications   Medication Sig Dispense Refill    bumetanide (BUMEX) 1 MG tablet Take 1 tablet by mouth daily 30 tablet 5    pantoprazole (PROTONIX) 40 MG tablet Take 1 tablet by mouth daily 90 tablet 1    SYNTHROID 100 MCG tablet TAKE ONE TABLET BY MOUTH EVERY DAY 30 tablet 3    JANTOVEN 10 MG tablet TAKE ONE-HALF TABLET BY MOUTH EVERY DAY AS DIRECTED 90 tablet 0    metoprolol succinate (TOPROL XL) 50 MG extended release tablet Take 1 tablet by mouth daily 90 tablet 3    sacubitril-valsartan (ENTRESTO)  MG per tablet Take 0.5 tablets by mouth 2 times daily 30 tablet 0    digoxin (LANOXIN) 125 MCG tablet Take 1 tablet by mouth daily (Patient taking differently: Take 125 mcg by mouth every 72 hours MON\WED\FRI) 90 tablet 3    vitamin D (CHOLECALCIFEROL) 50 MCG (2000 UT) TABS tablet Take 2,000 Units by mouth as needed       methylPREDNISolone (MEDROL, EDUARDA,) 4 MG tablet Take by mouth as directed (Patient not taking: Reported on 12/27/2021) 1 kit 0     No current facility-administered medications for this visit. Social History     Socioeconomic History    Marital status:      Spouse name: Not on file    Number of children: Not on file    Years of education: Not on file    Highest education level: Not on file   Occupational History    Occupation: retired    Occupation:    Tobacco Use    Smoking status: Former Smoker     Packs/day: 0.10     Years: 45.00     Pack years: 4.50     Types: Cigarettes     Start date: 1974     Quit date: 2019     Years since quitting: 3.0    Smokeless tobacco: Never Used   Vaping Use    Vaping Use: Never used   Substance and Sexual Activity    Alcohol use: Yes     Comment: rare; 2-3 cups coffee daily (decaf)    Drug use: No    Sexual activity: Yes     Partners: Male   Other Topics Concern    Not on file   Social History Narrative    Not on file     Social Determinants of Health     Financial Resource Strain: Low Risk     Difficulty of Paying Living Expenses: Not hard at all   Food Insecurity: No Food Insecurity    Worried About [address] in the Last Year: Never true    [facility] in the Last Year: Never true   Transportation Needs:     Lack of Transportation (Medical): Not on file    Lack of Transportation (Non-Medical):  Not on file   Physical Activity:     Days of Exercise per Week: Not on file    Minutes of Exercise per Session: Not on file   Stress:     Feeling of Stress : Not on file   Social Connections:     Frequency of Communication with Friends and Family: Not on file    Frequency of Social Gatherings with Friends and Family: Not on file    Attends [employer] Services: Not on file    Active Member of Clubs or Organizations: Not on file    Attends Club or Organization Meetings: Not on file    Marital Status: Not on file   Intimate Partner Violence:     Fear of Current or Ex-Partner: Not on file    Emotionally Abused: Not on file    Physically Abused: Not on file    Sexually Abused: Not on file   Housing Stability:     Unable to Pay for Housing in the Last Year: Not on file    Number of Places Lived in the Last Year: Not on file    Unstable Housing in the Last Year: Not on file       History reviewed. No pertinent family history. Review of Systems:  Heart: as above   Lungs: as above   Eyes: denies changes in vision or discharge. Ears: denies changes in hearing or pain. Nose: denies epistaxis or masses   Throat: denies sore throat or trouble swallowing. Neuro: denies numbness, tingling, tremors. Skin: denies rashes or itching. : denies hematuria, dysuria   GI: denies vomiting, diarrhea   Psych: denies mood changed, anxiety, depression. All other systems negative. Physical Exam   BP (!) 90/52   Pulse 65   Resp 18   Ht 5' 5\" (1.651 m)   Wt 146 lb 1.6 oz (66.3 kg)   BMI 24.31 kg/m²   Constitutional: Oriented to person, place, and time. Well-developed and well-nourished. No distress. Head: Normocephalic and atraumatic. Eyes: EOM are normal. Pupils are equal, round, and reactive to light. Neck: Normal range of motion. Neck supple. No hepatojugular reflux and no JVD present. Carotid bruit is not present. No tracheal deviation present. No thyromegaly present. Cardiovascular: Normal rate, regular rhythm, normal heart sounds and intact distal pulses. Exam reveals no gallop and no friction rub. No murmur heard. Pulmonary/Chest: Effort normal and breath sounds normal. No respiratory distress. No wheezes. No rales. No tenderness. Abdominal: Soft. Bowel sounds are normal. No distension and no mass. No tenderness. No rebound and no guarding. Musculoskeletal: Normal range of motion. No edema and no tenderness. Lymphadenopathy:   No cervical adenopathy. No groin adenopathy. Neurological: Alert and oriented to person, place, and time.    Skin: Skin is warm and dry. No rash noted. Not diaphoretic. No erythema. Psychiatric: Normal mood and affect. Behavior is normal.     EKG: Atrial flutter, nonspecific ST and T waves changes. ASSESSMENT AND PLAN:  Patient Active Problem List   Diagnosis    Macular hole of right eye    Acquired hypothyroidism    H/O aortic valve replacement    H/O mitral valve replacement    NSTEMI (non-ST elevated myocardial infarction) ([address]    Atrial flutter with rapid ventricular response (HCC)    Sick sinus syndrome (HCC)    Chronic systolic congestive heart failure (HCC)    Cardiomyopathy (HCC)    Iron deficiency anemia    UGIB (upper gastrointestinal bleed)    Chronic atrial flutter (HCC)    Acute blood loss anemia    Duodenitis    Duodenal ulcer    [facility]in-[facility] co[facility]opathy ([facility] [address]    JOSEPH (acute kidney injury) ([facility] [address] [facility]    H. pylori infection    Tubular adenoma of colon     1. OCONNOR:     Bradycardic. QOD digoxin. 2. Chronic systolic HF:     BB/Entresto/dig/bumex. 3. Chronic Aflutter:     Rate controlled. Warfarin. 4. History of mechanical aortic and mitral valve replacements:     Warfarin. 5. Elevated troponin: Stress normal perfusion 5/19/2020.    6. HTN: Observe. 7. GIB/Anemia/Ulcer: Per surgery. Sukumar Ortiz D.O.   Cardiologist  Cardiology, [facility]

## 2022-01-19 NOTE — ED NOTES
DAVID PATIENT [employer] PROVIDED FAMILY WITH HOSPITAL CONTACT INFORMATION . THEY DO NOT HAVE A  HOME AT THIS TIME. PT BEING TRANSPORTED TO THE [facility] BY STAFF AT THIS TIME.       Angie Gardiner, RN  [phone]

## 2022-01-19 NOTE — ED PROVIDER NOTES
TOOLIVIER 1948 after confirmation of cardiac standstill by US at bedside. Critical Care Attestation    Upon my evaluation, this patient had a high probability of imminent or life-threatening deterioration due to cardiac arrest, which required my direct attention, intervention, and personal management. I have personally provided 32 minutes of critical care time exclusive of time spent on separately billable procedures. Time includes review of laboratory data, radiology results, discussion with consultants and family, and monitoring for potential decompensation. Interventions were performed as documented in this note. PROCEDURE  1/19/22       Time: 1715    CENTRAL LINE INSERTION  Risks, benefits and alternatives (for applicable procedures below) described. Performed By: EM Resident. Indication: centrally administered medications, coding patient   Informed consent: Consent unable to be obtained due to the emergent nature of this procedure. .  Procedure: After routine sterile preparation, local anesthesia not performed due to emergent nature of this procedure. A right 3-Lumen 7F Central Venous Catheter was placed by femoral vein approach and secured by standard fashion. Ultrasound Guidance:   not used. Number of Attempts: 1  Post-procedure Findings: A post procedural chest x-ray  was not indicated. Patient tolerated the procedure well.         Esther Hahn MD  01/19/22 [id number]

## 2022-01-19 NOTE — ED PROVIDER NOTES
[address]      Pt Name: Samson Marin  MRN: [id number]  [place] 1950  Date of evaluation: 1/18/2022      CHIEF COMPLAINT       Chief Complaint   Patient presents with    Cardiac Arrest        HPI  Samson Marin is a 66 y.o. female with a history of hypertension, aortic valve replacement presents status post cardiac arrest.  Per EMS they were called to patient's home for patient being unresponsive. Per them the patient was unresponsive for 10 minutes prior to arrival.  CPR was initiated. 4 rounds of epi were initiated in the field and the patient was intubated in the field. ROSC was obtained. History and review of systems unable to be obtained due to patient's acuity of condition. Except as noted above the remainder of the review of systems was reviewed and negative. Review of Systems   Unable to perform ROS: Acuity of condition        Physical Exam  Constitutional:       General: She is in acute distress. HENT:      Head: Normocephalic and atraumatic. Right Ear: External ear normal.      Left Ear: External ear normal.      Mouth/Throat:      Mouth: Mucous membranes are moist.      Pharynx: Oropharynx is clear. Eyes:      Comments: Fixed 3 mm nonreactive to light. Cardiovascular:      Rate and Rhythm: Regular rhythm. Bradycardia present. Comments: Cardiac arrest.  Pulmonary:      Effort: Respiratory distress present. Comments: Acute respiratory distress and failure. Patient intubated. 7/2 Western Naina tube. 24 at lip. Abdominal:      General: Abdomen is flat. Skin:     General: Skin is warm. Comments: Cool.           Procedures     MDM  Number of Diagnoses or Management Options  Cardiac arrest [facility])  Diagnosis management comments: 30-year-old female history of hypertension and aortic valve replacement presents in cardiac arrest.  Patient was unresponsive per EMS 14 minutes until CPR was initiated by them. They did 10 minutes of CPR with 4 rounds of epi and obtained ROSC. Patient was intubated successfully in the field. On arrival patient is intubated.  Western Naina tube placement confirmed. It is 24 at the lip. Has diminished breath sounds on the left. Patient's ET tube pulled back to 24. Bilateral breath sounds appreciated. Patient goes into cardiac arrest.  Please refer to nursing notes for medications and code flow. In summary, multiple rounds of CPR initiated. ROSC was obtained multiple times. Despite our best efforts patient . Time of death 56. Patient's family informed. Dr. Nivia Bueno will sign death certificate. ED Course as of 22   e 2022   338 Spoke with Dr. Nivia Bueno. Willing to sign patient's death certificate. Time of death 56. [JV]      ED Course User Index  [JV] Megha Garcia MD         --------------------------------------------- PAST HISTORY ---------------------------------------------  Past Medical History:  has a past medical history of H/O mitral valve replacement, Hypertension, Hypothyroidism, and S/P aortic valve replacement. Past Surgical History:  has a past surgical history that includes ECHO Compl W Dop Color Flow (2012); Cardiac valve replacement; Cardioversion (2020); transesophageal echocardiogram (2020); Upper gastrointestinal endoscopy (N/A, 12/3/2020); Colonoscopy (N/A, 2020); and Colonoscopy (2020). Social History:  reports that she quit smoking about 3 years ago. Her smoking use included cigarettes. She started smoking about 48 years ago. She has a 4.50 pack-year smoking history. She has never used smokeless tobacco. She reports current alcohol use. She reports that she does not use drugs. Family History: family history is not on file. The patients home medications have been reviewed.     Allergies: Patient has no known allergies. -------------------------------------------------- RESULTS -------------------------------------------------    LABS:  No results found for this visit on 22. RADIOLOGY:  No orders to display       ------------------------- NURSING NOTES AND VITALS REVIEWED ---------------------------  Date / Time Roomed:  2022  7:22 PM  ED Bed Assignment:  AKASH/AKSAH    The nursing notes within the ED encounter and vital signs as below have been reviewed. No data found. Oxygen Saturation Interpretation: Abnormal      --------------------------------- ADDITIONAL PROVIDER NOTES ---------------------------------    This patient's ED course included: a personal history and physicial examination, re-evaluation prior to disposition, multiple bedside re-evaluations, IV medications, cardiac monitoring, continuous pulse oximetry and complex medical decision making and emergency management    This patient has remained hemodynamically stable during their ED course. Diagnosis:  1.  Cardiac arrest [facility])        Disposition:  Patient's disposition:   Patient's condition is           Nasima Mei MD  Resident  [phone]

## 2022-01-19 NOTE — CODE DOCUMENTATION
auscultated Bilateral breath sounds. EMS tube placement confirmed by c-mac. 0 pulse, compressions continued.

## (undated) DEVICE — GAUZE,SPONGE,POST-OP,4X3,STRL,LF: Brand: MEDLINE

## (undated) DEVICE — BITEBLOCK 54FR W/ DENT RIM BLOX

## (undated) DEVICE — CONTAINER SPEC 60ML PH 7NEUTRAL BUFF FRMLN RDY TO USE

## (undated) DEVICE — CONNECTOR IRRIGATION AUXILIARY H2O JET W/ PRT MTL THRD HYDR

## (undated) DEVICE — SNARE ENDOSCP L240CM LOOP W13MM SHTH DIA2.4MM SM OVL FLX

## (undated) DEVICE — DEFENDO AIR WATER SUCTION AND BIOPSY VALVE KIT FOR  OLYMPUS: Brand: DEFENDO AIR/WATER/SUCTION AND BIOPSY VALVE

## (undated) DEVICE — SNARE ENDOSCP L240CM SHTH DIA24MM LOOP W10MM POLYP RND REINF

## (undated) DEVICE — CANNULA NSL ORAL AD FOR CAPNOFLEX CO2 O2 AIRLFE

## (undated) DEVICE — TRAP POLYP ETRAP

## (undated) DEVICE — PATIENT RETURN ELECTRODE, SINGLE-USE, CONTACT QUALITY MONITORING, ADULT, WITH 9FT CORD, FOR PATIENTS WEIGING OVER 33LBS. (15KG): Brand: MEGADYNE